# Patient Record
Sex: MALE | Race: WHITE | NOT HISPANIC OR LATINO | Employment: OTHER | URBAN - METROPOLITAN AREA
[De-identification: names, ages, dates, MRNs, and addresses within clinical notes are randomized per-mention and may not be internally consistent; named-entity substitution may affect disease eponyms.]

---

## 2020-08-03 ENCOUNTER — TRANSFERRED RECORDS (OUTPATIENT)
Dept: HEALTH INFORMATION MANAGEMENT | Facility: CLINIC | Age: 50
End: 2020-08-03

## 2020-08-24 ENCOUNTER — TRANSFERRED RECORDS (OUTPATIENT)
Dept: HEALTH INFORMATION MANAGEMENT | Facility: CLINIC | Age: 50
End: 2020-08-24

## 2020-09-09 ENCOUNTER — TRANSFERRED RECORDS (OUTPATIENT)
Dept: HEALTH INFORMATION MANAGEMENT | Facility: CLINIC | Age: 50
End: 2020-09-09

## 2020-10-14 ENCOUNTER — TRANSFERRED RECORDS (OUTPATIENT)
Dept: HEALTH INFORMATION MANAGEMENT | Facility: CLINIC | Age: 50
End: 2020-10-14

## 2021-03-12 ENCOUNTER — TRANSFERRED RECORDS (OUTPATIENT)
Dept: HEALTH INFORMATION MANAGEMENT | Facility: CLINIC | Age: 51
End: 2021-03-12

## 2022-11-19 ENCOUNTER — HOSPITAL ENCOUNTER (EMERGENCY)
Facility: HOSPITAL | Age: 52
Discharge: HOME | End: 2022-11-19
Attending: PHYSICIAN ASSISTANT | Admitting: PHYSICIAN ASSISTANT
Payer: COMMERCIAL

## 2022-11-19 VITALS
SYSTOLIC BLOOD PRESSURE: 147 MMHG | HEART RATE: 79 BPM | TEMPERATURE: 97.3 F | DIASTOLIC BLOOD PRESSURE: 95 MMHG | RESPIRATION RATE: 16 BRPM | OXYGEN SATURATION: 96 %

## 2022-11-19 DIAGNOSIS — S71.132A PUNCTURE WOUND OF LEFT THIGH, INITIAL ENCOUNTER: ICD-10-CM

## 2022-11-19 PROBLEM — M05.9 SEROPOSITIVE RHEUMATOID ARTHRITIS (H): Status: ACTIVE | Noted: 2019-12-28

## 2022-11-19 PROBLEM — K40.90 NON-RECURRENT UNILATERAL INGUINAL HERNIA WITHOUT OBSTRUCTION OR GANGRENE: Status: ACTIVE | Noted: 2021-01-26

## 2022-11-19 PROCEDURE — 99213 OFFICE O/P EST LOW 20 MIN: CPT | Performed by: PHYSICIAN ASSISTANT

## 2022-11-19 PROCEDURE — 96372 THER/PROPH/DIAG INJ SC/IM: CPT | Performed by: PHYSICIAN ASSISTANT

## 2022-11-19 PROCEDURE — 90715 TDAP VACCINE 7 YRS/> IM: CPT | Performed by: PHYSICIAN ASSISTANT

## 2022-11-19 PROCEDURE — 250N000011 HC RX IP 250 OP 636: Performed by: PHYSICIAN ASSISTANT

## 2022-11-19 PROCEDURE — G0463 HOSPITAL OUTPT CLINIC VISIT: HCPCS | Mod: 25

## 2022-11-19 PROCEDURE — 90471 IMMUNIZATION ADMIN: CPT | Performed by: PHYSICIAN ASSISTANT

## 2022-11-19 RX ORDER — SACCHAROMYCES BOULARDII 250 MG
250 CAPSULE ORAL 2 TIMES DAILY
Qty: 20 CAPSULE | Refills: 0 | Status: SHIPPED | OUTPATIENT
Start: 2022-11-19 | End: 2022-11-29

## 2022-11-19 RX ORDER — SULFAMETHOXAZOLE/TRIMETHOPRIM 800-160 MG
1 TABLET ORAL 2 TIMES DAILY
Qty: 20 TABLET | Refills: 0 | Status: SHIPPED | OUTPATIENT
Start: 2022-11-19 | End: 2022-11-29

## 2022-11-19 RX ORDER — CEFTRIAXONE SODIUM 1 G
1 VIAL (EA) INJECTION ONCE
Status: COMPLETED | OUTPATIENT
Start: 2022-11-19 | End: 2022-11-19

## 2022-11-19 RX ADMIN — CEFTRIAXONE 1 G: 1 INJECTION, POWDER, FOR SOLUTION INTRAMUSCULAR; INTRAVENOUS at 12:08

## 2022-11-19 RX ADMIN — CLOSTRIDIUM TETANI TOXOID ANTIGEN (FORMALDEHYDE INACTIVATED), CORYNEBACTERIUM DIPHTHERIAE TOXOID ANTIGEN (FORMALDEHYDE INACTIVATED), BORDETELLA PERTUSSIS TOXOID ANTIGEN (GLUTARALDEHYDE INACTIVATED), BORDETELLA PERTUSSIS FILAMENTOUS HEMAGGLUTININ ANTIGEN (FORMALDEHYDE INACTIVATED), BORDETELLA PERTUSSIS PERTACTIN ANTIGEN, AND BORDETELLA PERTUSSIS FIMBRIAE 2/3 ANTIGEN 0.5 ML: 5; 2; 2.5; 5; 3; 5 INJECTION, SUSPENSION INTRAMUSCULAR at 12:07

## 2022-11-19 ASSESSMENT — ENCOUNTER SYMPTOMS
FLANK PAIN: 0
STRIDOR: 0
COUGH: 0
FACIAL ASYMMETRY: 0
AGITATION: 0
FEVER: 0
SEIZURES: 0
EYE REDNESS: 0
FACIAL SWELLING: 0
CONFUSION: 0
TREMORS: 0

## 2022-11-19 NOTE — ED TRIAGE NOTES
"Pt presents with puncture wound to left thigh after cleaning a deer and \"stabbing\" himself.  Bleeding controlled in triage, wrapped in guaze and coban.       "

## 2022-11-19 NOTE — ED PROVIDER NOTES
History     Chief Complaint   Patient presents with     Puncture Wound     HPI  Robson Galeano is a 52 year old male who was helping a friend clean a deer.  He accidentally sustained a puncture wound to his left distal thigh area.  This initially bled out a lot but this has since subsided.  The patient is unclear when his last tetanus was.  He is concerned about infection.  He states his knife was not very clean.  Denies any other issues at this time.    Allergies:  No Known Allergies    Problem List:    There are no problems to display for this patient.       Past Medical History:    No past medical history on file.    Past Surgical History:    No past surgical history on file.    Family History:    No family history on file.    Social History:  Marital Status:   [2]        Medications:    No current outpatient medications on file.        Review of Systems   Constitutional: Negative for fever.   HENT: Negative for drooling and facial swelling.    Eyes: Negative for redness and visual disturbance.   Respiratory: Negative for cough and stridor.    Genitourinary: Negative for flank pain.   Musculoskeletal:        Left distal thigh with puncture wound,  minimal bleeding.   Skin: Negative for pallor.   Neurological: Negative for tremors, seizures and facial asymmetry.   Psychiatric/Behavioral: Negative for agitation and confusion.   All other systems reviewed and are negative.      Physical Exam   BP: 147/95  Pulse: 79  Temp: 97.3  F (36.3  C)  Resp: 16  SpO2: 96 %      Physical Exam  Vitals and nursing note reviewed.   Constitutional:       General: He is not in acute distress.     Appearance: Normal appearance. He is not ill-appearing or toxic-appearing.   HENT:      Head: Normocephalic.      Nose: Nose normal.   Eyes:      General: No scleral icterus.     Extraocular Movements: Extraocular movements intact.   Neck:      Trachea: No tracheal deviation.   Cardiovascular:      Rate and Rhythm: Normal rate.    Pulmonary:      Effort: Pulmonary effort is normal. No respiratory distress.      Breath sounds: No stridor.   Musculoskeletal:         General: Normal range of motion.      Cervical back: Normal range of motion.        Legs:       Comments: Left distal thigh with 1 cm puncture wound,  minimal bleeding.  CMSx4.   Skin:     General: Skin is warm and dry.      Coloration: Skin is not jaundiced or pale.   Neurological:      General: No focal deficit present.      Mental Status: He is alert and oriented to person, place, and time.   Psychiatric:         Attention and Perception: Attention normal.         Mood and Affect: Mood normal.         ED Course       No results found for this or any previous visit (from the past 24 hour(s)).    Medications   Tdap (tetanus-diphtheria-acell pertussis) (ADACEL) injection 0.5 mL (0.5 mLs Intramuscular Given 11/19/22 1207)   cefTRIAXone (ROCEPHIN) in lidocaine 1% (PF) injection 1 g (1 g Intramuscular Given 11/19/22 1208)       Assessments & Plan (with Medical Decision Making)     I have reviewed the nursing notes.    I have reviewed the findings, diagnosis, plan and need for follow up with the patient.      New Prescriptions    SACCHAROMYCES BOULARDII (FLORASTOR) 250 MG CAPSULE    Take 1 capsule (250 mg) by mouth 2 times daily for 10 days    SULFAMETHOXAZOLE-TRIMETHOPRIM (BACTRIM DS) 800-160 MG TABLET    Take 1 tablet by mouth 2 times daily for 10 days       Final diagnoses:   Puncture wound of left thigh, initial encounter     Robson Galeano is a 52 year old male who was helping a friend clean a deer.  He accidentally sustained a puncture wound to his left distal thigh area.  This initially bled out a lot but this has since subsided.  The patient is unclear when his last tetanus was.  He is concerned about infection.  He states his knife was not very clean.  Denies any other issues at this time.  Physical exam shows he is afebrile.  Vital signs stable.  His Left distal thigh with 1  cm puncture wound,  minimal bleeding.  CMSx4.  This was cleansed and bandaged.  I discussed with him that given its a puncture wound we will at this heal via secondary intention.  Patient was given a tetanus booster.  He was also given Rocephin IM in the ER.  Rx for Bactrim and Florastor.  Wound care was discussed.  Red flags when to return were discussed as well.  Continue to monitor his symptoms and return if there is any concerns for further evaluation as needed.  I explained my diagnostic considerations and recommendations and the patient voiced an understanding and was in agreement with the treatment plan. All questions were answered to the best of my ability.  We discussed potential side effects of any prescribed or recommended therapies, as well as expectations for response to treatments.    11/19/2022   [facility]     Masood Currie PA-C  11/19/22 1430

## 2022-11-19 NOTE — ED TRIAGE NOTES
Pt presents with c/o stab wound from when he was cleaning a deer.  States that the knife was dirty,  From cleaning a deer.  tt-dap wanted

## 2023-03-02 ENCOUNTER — OFFICE VISIT (OUTPATIENT)
Dept: FAMILY MEDICINE | Facility: CLINIC | Age: 53
End: 2023-03-02
Payer: COMMERCIAL

## 2023-03-02 ENCOUNTER — TELEPHONE (OUTPATIENT)
Dept: FAMILY MEDICINE | Facility: CLINIC | Age: 53
End: 2023-03-02

## 2023-03-02 VITALS
OXYGEN SATURATION: 97 % | SYSTOLIC BLOOD PRESSURE: 136 MMHG | HEIGHT: 67 IN | BODY MASS INDEX: 34.21 KG/M2 | HEART RATE: 66 BPM | RESPIRATION RATE: 16 BRPM | TEMPERATURE: 96.6 F | WEIGHT: 218 LBS | DIASTOLIC BLOOD PRESSURE: 76 MMHG

## 2023-03-02 DIAGNOSIS — E29.1 HYPOGONADISM MALE: ICD-10-CM

## 2023-03-02 DIAGNOSIS — N40.0 BENIGN PROSTATIC HYPERPLASIA, UNSPECIFIED WHETHER LOWER URINARY TRACT SYMPTOMS PRESENT: ICD-10-CM

## 2023-03-02 DIAGNOSIS — A60.00 GENITAL HERPES SIMPLEX, UNSPECIFIED SITE: ICD-10-CM

## 2023-03-02 DIAGNOSIS — E66.01 MORBID OBESITY (H): ICD-10-CM

## 2023-03-02 DIAGNOSIS — M54.50 CHRONIC BILATERAL LOW BACK PAIN WITHOUT SCIATICA: Primary | ICD-10-CM

## 2023-03-02 DIAGNOSIS — Z11.59 NEED FOR HEPATITIS C SCREENING TEST: ICD-10-CM

## 2023-03-02 DIAGNOSIS — Z13.6 CARDIOVASCULAR SCREENING; LDL GOAL LESS THAN 130: ICD-10-CM

## 2023-03-02 DIAGNOSIS — G47.01 INSOMNIA DUE TO MEDICAL CONDITION: ICD-10-CM

## 2023-03-02 DIAGNOSIS — L30.9 DERMATITIS: ICD-10-CM

## 2023-03-02 DIAGNOSIS — Z12.11 SCREEN FOR COLON CANCER: ICD-10-CM

## 2023-03-02 DIAGNOSIS — G89.29 CHRONIC BILATERAL LOW BACK PAIN WITHOUT SCIATICA: Primary | ICD-10-CM

## 2023-03-02 DIAGNOSIS — M05.9 SEROPOSITIVE RHEUMATOID ARTHRITIS (H): ICD-10-CM

## 2023-03-02 LAB
ALBUMIN SERPL BCG-MCNC: 4.6 G/DL (ref 3.5–5.2)
ALP SERPL-CCNC: 90 U/L (ref 40–129)
ALT SERPL W P-5'-P-CCNC: 47 U/L (ref 10–50)
ANION GAP SERPL CALCULATED.3IONS-SCNC: 10 MMOL/L (ref 7–15)
AST SERPL W P-5'-P-CCNC: 29 U/L (ref 10–50)
BILIRUB SERPL-MCNC: 0.6 MG/DL
BUN SERPL-MCNC: 16.5 MG/DL (ref 6–20)
CALCIUM SERPL-MCNC: 9.3 MG/DL (ref 8.6–10)
CHLORIDE SERPL-SCNC: 107 MMOL/L (ref 98–107)
CHOLEST SERPL-MCNC: 158 MG/DL
CREAT SERPL-MCNC: 1.13 MG/DL (ref 0.67–1.17)
DEPRECATED HCO3 PLAS-SCNC: 28 MMOL/L (ref 22–29)
ERYTHROCYTE [DISTWIDTH] IN BLOOD BY AUTOMATED COUNT: 12.2 % (ref 10–15)
GFR SERPL CREATININE-BSD FRML MDRD: 77 ML/MIN/1.73M2
GLUCOSE SERPL-MCNC: 181 MG/DL (ref 70–99)
HBA1C MFR BLD: 5.6 % (ref 0–5.6)
HCT VFR BLD AUTO: 41.9 % (ref 40–53)
HDLC SERPL-MCNC: 45 MG/DL
HGB BLD-MCNC: 14.4 G/DL (ref 13.3–17.7)
LDLC SERPL CALC-MCNC: 86 MG/DL
MCH RBC QN AUTO: 30.1 PG (ref 26.5–33)
MCHC RBC AUTO-ENTMCNC: 34.4 G/DL (ref 31.5–36.5)
MCV RBC AUTO: 88 FL (ref 78–100)
NONHDLC SERPL-MCNC: 113 MG/DL
PLATELET # BLD AUTO: 158 10E3/UL (ref 150–450)
POTASSIUM SERPL-SCNC: 3.6 MMOL/L (ref 3.4–5.3)
PROT SERPL-MCNC: 7.1 G/DL (ref 6.4–8.3)
RBC # BLD AUTO: 4.79 10E6/UL (ref 4.4–5.9)
SODIUM SERPL-SCNC: 145 MMOL/L (ref 136–145)
TRIGL SERPL-MCNC: 137 MG/DL
WBC # BLD AUTO: 4.6 10E3/UL (ref 4–11)

## 2023-03-02 PROCEDURE — 80053 COMPREHEN METABOLIC PANEL: CPT | Performed by: PHYSICIAN ASSISTANT

## 2023-03-02 PROCEDURE — 99204 OFFICE O/P NEW MOD 45 MIN: CPT | Performed by: PHYSICIAN ASSISTANT

## 2023-03-02 PROCEDURE — 84270 ASSAY OF SEX HORMONE GLOBUL: CPT | Performed by: PHYSICIAN ASSISTANT

## 2023-03-02 PROCEDURE — 36415 COLL VENOUS BLD VENIPUNCTURE: CPT | Performed by: PHYSICIAN ASSISTANT

## 2023-03-02 PROCEDURE — 83036 HEMOGLOBIN GLYCOSYLATED A1C: CPT | Performed by: PHYSICIAN ASSISTANT

## 2023-03-02 PROCEDURE — 80061 LIPID PANEL: CPT | Performed by: PHYSICIAN ASSISTANT

## 2023-03-02 PROCEDURE — 84403 ASSAY OF TOTAL TESTOSTERONE: CPT | Performed by: PHYSICIAN ASSISTANT

## 2023-03-02 PROCEDURE — 86803 HEPATITIS C AB TEST: CPT | Performed by: PHYSICIAN ASSISTANT

## 2023-03-02 PROCEDURE — 85027 COMPLETE CBC AUTOMATED: CPT | Performed by: PHYSICIAN ASSISTANT

## 2023-03-02 RX ORDER — VALACYCLOVIR HYDROCHLORIDE 1 G/1
1000 TABLET, FILM COATED ORAL DAILY
Qty: 5 TABLET | Refills: 6 | Status: SHIPPED | OUTPATIENT
Start: 2023-03-02 | End: 2023-08-02

## 2023-03-02 RX ORDER — FOLIC ACID 1 MG/1
1 TABLET ORAL DAILY
COMMUNITY
Start: 2021-05-17 | End: 2023-03-02

## 2023-03-02 RX ORDER — TESTOSTERONE CYPIONATE 200 MG/ML
50 INJECTION, SOLUTION INTRAMUSCULAR WEEKLY
Qty: 3 ML | Refills: 1 | Status: SHIPPED | OUTPATIENT
Start: 2023-03-02 | End: 2023-06-28

## 2023-03-02 RX ORDER — HYDROCODONE BITARTRATE AND ACETAMINOPHEN 5; 325 MG/1; MG/1
1-2 TABLET ORAL
COMMUNITY
Start: 2021-05-27 | End: 2023-03-02

## 2023-03-02 RX ORDER — TAMSULOSIN HYDROCHLORIDE 0.4 MG/1
0.4 CAPSULE ORAL
COMMUNITY
Start: 2022-07-28 | End: 2023-03-02

## 2023-03-02 RX ORDER — TESTOSTERONE CYPIONATE 200 MG/ML
50 INJECTION, SOLUTION INTRAMUSCULAR WEEKLY
Qty: 3 ML | Refills: 1 | Status: SHIPPED | OUTPATIENT
Start: 2023-03-02 | End: 2023-03-02

## 2023-03-02 RX ORDER — NEEDLES, DISPOSABLE 25GX5/8"
1 NEEDLE, DISPOSABLE MISCELLANEOUS WEEKLY
Qty: 12 EACH | Refills: 3 | Status: SHIPPED | OUTPATIENT
Start: 2023-03-02 | End: 2024-07-05

## 2023-03-02 RX ORDER — TESTOSTERONE CYPIONATE 200 MG/ML
INJECTION, SOLUTION INTRAMUSCULAR
COMMUNITY
End: 2023-03-02

## 2023-03-02 RX ORDER — VALACYCLOVIR HYDROCHLORIDE 1 G/1
TABLET, FILM COATED ORAL
COMMUNITY
Start: 2022-01-31 | End: 2023-03-02

## 2023-03-02 RX ORDER — TRIAMCINOLONE ACETONIDE 1 MG/G
OINTMENT TOPICAL 2 TIMES DAILY
Qty: 80 G | Refills: 1 | Status: SHIPPED | OUTPATIENT
Start: 2023-03-02 | End: 2024-01-16

## 2023-03-02 RX ORDER — FOLIC ACID 1 MG/1
1000 TABLET ORAL DAILY
Qty: 90 TABLET | Refills: 3 | Status: SHIPPED | OUTPATIENT
Start: 2023-03-02 | End: 2023-04-27

## 2023-03-02 RX ORDER — TAMSULOSIN HYDROCHLORIDE 0.4 MG/1
0.4 CAPSULE ORAL DAILY
Qty: 90 CAPSULE | Refills: 3 | Status: SHIPPED | OUTPATIENT
Start: 2023-03-02 | End: 2024-01-16

## 2023-03-02 RX ORDER — AMITRIPTYLINE HYDROCHLORIDE 10 MG/1
10 TABLET ORAL AT BEDTIME
Qty: 30 TABLET | Refills: 1 | Status: SHIPPED | OUTPATIENT
Start: 2023-03-02 | End: 2023-04-07

## 2023-03-02 RX ORDER — TRIAMCINOLONE ACETONIDE 1 MG/G
OINTMENT TOPICAL
COMMUNITY
Start: 2022-08-24 | End: 2023-03-02

## 2023-03-02 ASSESSMENT — PAIN SCALES - GENERAL: PAINLEVEL: MODERATE PAIN (5)

## 2023-03-02 NOTE — NURSING NOTE
"Chief Complaint   Patient presents with     Back Pain     BP (!) 142/92   Pulse 66   Temp (!) 96.6  F (35.9  C) (Tympanic)   Resp 16   Ht 1.702 m (5' 7\")   Wt 98.9 kg (218 lb)   SpO2 97%   BMI 34.14 kg/m   Estimated body mass index is 34.14 kg/m  as calculated from the following:    Height as of this encounter: 1.702 m (5' 7\").    Weight as of this encounter: 98.9 kg (218 lb).  Patient presents to the clinic using No DME      Health Maintenance that is potentially due pending provider review:    Health Maintenance Due   Topic Date Due     YEARLY PREVENTIVE VISIT  Never done     ANNUAL REVIEW OF HM ORDERS  Never done     ADVANCE CARE PLANNING  Never done     HEPATITIS B IMMUNIZATION (1 of 3 - 3-dose series) Never done     COLORECTAL CANCER SCREENING  Never done     HIV SCREENING  Never done     HEPATITIS C SCREENING  Never done     LIPID  Never done     COVID-19 Vaccine (4 - Booster for Moderna series) 02/01/2022     INFLUENZA VACCINE (1) 09/01/2022        Colonoscopy 5 years ago - family history mother colon cancer 48.        "

## 2023-03-02 NOTE — PATIENT INSTRUCTIONS
All your medicines except for Methotrexate were refilled.     Start Amitriptyline for sleep and I hope that this works for your back pain as well.     Follow-up in 1 month for recheck.

## 2023-03-02 NOTE — TELEPHONE ENCOUNTER
ORDER FOR TESTOSTERONE SENT TO [facility] IN [facility] --- FREIDA IS HERE BACK IN  [place] --- CAUSE CONTROLLED SUBSTANCE NEED NEW ORDER SENT TO THIS PHARMACY PLEASE - CAN'T TRANSFER -- THANK YOU Jennifer heck,pharmacy technican

## 2023-03-02 NOTE — PROGRESS NOTES
"Assessment & Plan   Chronic bilateral low back pain without sciatica  Insomnia due to medical condition  Patient is transferring care from [facility].  He has seen [facility] for his chronic low back pain in the past.  He has tried multiple injections with intermittent improvement as well as a radiofrequency ablation in the past.  He has not seen them in about 1 year.  He is planning to follow-up somewhere closer to his home now which is out of the [facility] so he was referred to spine with [facility] for further management of his previous back issues.  In the meantime he does note very significant difficulty sleeping due to pain.  Only gets about 3 hours of sleep per night due to his pain.  He has not really tried any medicine for this.  I discussed amitriptyline as a medicine that helps make people sleepy but also helps for people who have chronic pain/neuropathic pain.  He would like to start this to see if it works.  Start 10 mg at bedtime and follow-up in 1 month for recheck.  - Spine  Referral; Future  - amitriptyline (ELAVIL) 10 MG tablet; Take 1 tablet (10 mg) by mouth At Bedtime    Hypogonadism male  Previously has been on testosterone injections at home.   is reassuring.  We will recheck testosterone today, CBC, CMP.  Restart his previous dose of testosterone and supplies ordered.  Follow-up in 3 to 6 months for recheck  - Testosterone Free and Total; Future  - CBC with platelets; Future  - Comprehensive metabolic panel; Future  - Needle, Disp, (B-D HYPODERMIC NEEDLE) 18G X 1-1/2\" MISC; 1 each once a week  - Syringe Luer Slip (B-D SYRINGE LUER-MARIALUISA) 3 ML MISC; 1 each once a week  - Needle, Disp, (B-D HYPODERMIC NEEDLE) 22G X 1\" MISC; 1 each once a week  - testosterone cypionate (DEPOTESTOSTERONE) 200 MG/ML injection; Inject 0.25 mLs (50 mg) into the muscle once a week    Morbid obesity (H)  Body mass index is 34.14 kg/m .. Associated with hypogonadism, chronic back pain which would improve " "with weight loss. Encouraged healthy lifestyle changes.   - Hemoglobin A1c; Future    Benign prostatic hyperplasia, unspecified whether lower urinary tract symptoms present  Has been out of Flomax for about 6 months.  We discussed the increased risk of using testosterone in people who have BPH since it can artificially elevate PSA but also increase the risk of prostate cancer.  Patient is aware of this risk.  Restart Flomax.  - tamsulosin (FLOMAX) 0.4 MG capsule; Take 1 capsule (0.4 mg) by mouth daily    Seropositive rheumatoid arthritis (H)  Has been on methotrexate for quite some time however has not followed up with his rheumatologist in greater than 6 months so has been out of medicine.  I am not really comfortable prescribing methotrexate since I do not fully know its side effects, monitoring.  He was referred to rheumatology here in [place].  Refilled folic acid.  - Adult Rheumatology  Referral; Future  - folic acid (FOLVITE) 1 MG tablet; Take 1 tablet (1,000 mcg) by mouth daily    Dermatitis  Eczematous rash of his hands.  Refill triamcinolone  - triamcinolone (KENALOG) 0.1 % external ointment; Apply topically 2 times daily Tube should last 3 months.    Genital herpes simplex, unspecified site  Chronic and stable.  Refilled Valtrex.  - valACYclovir (VALTREX) 1000 mg tablet; Take 1 tablet (1,000 mg) by mouth daily for 5 days    Screen for colon cancer  Due for rescreening.  Colonoscopy ordered.  - Colonoscopy Screening  Referral; Future    Need for hepatitis C screening test  Due for screening.  - Hepatitis C Screen Reflex to HCV RNA Quant and Genotype; Future    CARDIOVASCULAR SCREENING; LDL GOAL LESS THAN 130  Due for screening.  - Lipid panel reflex to direct LDL Non-fasting; Future    BMI:   Estimated body mass index is 34.14 kg/m  as calculated from the following:    Height as of this encounter: 1.702 m (5' 7\").    Weight as of this encounter: 98.9 kg (218 lb).     Return in about 4 " "weeks (around 3/30/2023) for In-Clinic Visit, Medication refill.    JUSTIN Dickson [facility]    Chris Chance is a 53 year old, presenting for the following health issues:  Back Pain      History of Present Illness       Back Pain:  He presents for follow up of back pain. Patient's back pain is a chronic problem.  Location of back pain:  Right lower back, left lower back, right middle of back, left middle of back and left hip  Description of back pain: gnawing, shooting and stabbing  Back pain spreads: nowhere    Since patient first noticed back pain, pain is: always present, but gets better and worse  Does back pain interfere with his job:  Yes      Reason for visit:  Physical get meds    He eats 0-1 servings of fruits and vegetables daily.He consumes 0 sweetened beverage(s) daily.He exercises with enough effort to increase his heart rate 20 to 29 minutes per day.  He exercises with enough effort to increase his heart rate 5 days per week. He is missing 7 dose(s) of medications per week.    Has been out of meds for 6 months. Moved from the [facility]. Has not follow-up with specialists since that time.   Doing okay. Certainly feels more pain since off of Methotrexate and urinary symptoms have returned without Flomax.       Review of Systems   See HPI       Objective    /76   Pulse 66   Temp (!) 96.6  F (35.9  C) (Tympanic)   Resp 16   Ht 1.702 m (5' 7\")   Wt 98.9 kg (218 lb)   SpO2 97%   BMI 34.14 kg/m    Body mass index is 34.14 kg/m .  Physical Exam   Constitutional: healthy, alert, and no distress  Head: Normocephalic. Atraumatic  Eyes: No conjunctival injection, sclera anicteric  Respiratory: No resp distress.  Musculoskeletal: extremities normal- no gross deformities noted, and normal muscle tone  Neurologic: Gait normal. CN 2-12 grossly intact  Psychiatric: mentation appears normal and affect normal/bright               "

## 2023-03-05 LAB — HCV AB SERPL QL IA: NONREACTIVE

## 2023-03-06 LAB — SHBG SERPL-SCNC: 26 NMOL/L (ref 11–80)

## 2023-03-07 LAB
TESTOST FREE SERPL-MCNC: 4.67 NG/DL
TESTOST SERPL-MCNC: 214 NG/DL (ref 240–950)

## 2023-03-15 ENCOUNTER — OFFICE VISIT (OUTPATIENT)
Dept: RHEUMATOLOGY | Facility: CLINIC | Age: 53
End: 2023-03-15
Payer: COMMERCIAL

## 2023-03-15 ENCOUNTER — ANCILLARY PROCEDURE (OUTPATIENT)
Dept: GENERAL RADIOLOGY | Facility: CLINIC | Age: 53
End: 2023-03-15
Attending: PHYSICIAN ASSISTANT
Payer: COMMERCIAL

## 2023-03-15 VITALS
BODY MASS INDEX: 34.21 KG/M2 | SYSTOLIC BLOOD PRESSURE: 132 MMHG | DIASTOLIC BLOOD PRESSURE: 84 MMHG | WEIGHT: 218 LBS | HEIGHT: 67 IN

## 2023-03-15 DIAGNOSIS — M05.9 SEROPOSITIVE RHEUMATOID ARTHRITIS (H): Primary | ICD-10-CM

## 2023-03-15 DIAGNOSIS — Z79.899 HIGH RISK MEDICATION USE: ICD-10-CM

## 2023-03-15 DIAGNOSIS — M05.9 SEROPOSITIVE RHEUMATOID ARTHRITIS (H): ICD-10-CM

## 2023-03-15 PROCEDURE — 73130 X-RAY EXAM OF HAND: CPT | Mod: TC | Performed by: RADIOLOGY

## 2023-03-15 PROCEDURE — 99204 OFFICE O/P NEW MOD 45 MIN: CPT | Performed by: PHYSICIAN ASSISTANT

## 2023-03-15 RX ORDER — METHYLPREDNISOLONE 4 MG
TABLET, DOSE PACK ORAL
Qty: 21 TABLET | Refills: 0 | Status: SHIPPED | OUTPATIENT
Start: 2023-03-15 | End: 2023-04-06

## 2023-03-15 NOTE — PROGRESS NOTES
Rheumatology Clinic Visit  [facility]  ALVARADO Bunch     Robson Galeano MRN# [id number]   YOB: 1970 Age: 53 year old   Date of Visit: 03/15/2023  Primary care provider: Jace Campbell          Assessment and Plan:     1.  Seropositive rheumatoid arthritis  2.  High-risk medication use    Patient presents today to establish care for his seropositive rheumatoid arthritis.  He was previously treated with methotrexate, 25 mg weekly and reports that this worked quite well for him.  He does get diarrhea for 1.5 days after his methotrexate dosing.  He otherwise tolerates it well.  He has been taking his folic acid.  Physical examination does show some synovitis to the right wrist and decreased fist formation on the right.  Previous laboratory evaluations and imaging studies reviewed.  Results below.    We will get the patient started on methotrexate again as this worked well for his rheumatoid arthritis in the past.  We did discuss decreasing alcohol to 2 beverages per week.  He verbalized his understanding.  He will continue the folic acid, he will increase it to 2 mg to see if he can tolerate the methotrexate better.  He can also use Imodium as needed for diarrhea.  As he has active synovitis and is noticing a flare coming on we will try a Medrol Dosepak.  He will continue working with the spine clinic as well for his back pain.  We will monitor labs every 3 months to monitor for any medication toxicity.    Plan:     1. Schedule follow-up with Mary Sargent PA-C in 3 months.   2. Imaging: xray hands today  3. Labs: CBC, creatinine, albumin, AST, ALT, CRP and Sed Rate every 3 months  4. Medication recommendations:   a. Methotrexate: Will start you on 15mg (6 tablets) WEEKLY for 2 weeks, then increase to 8 tablets weekly.  While on Methotrexate:  b. -check labs (ALT/AST, albumin, CBC with platelets and creatinine) every 3 months  c. -Limit alcohol to 2 drinks weekly  d. -Take Folic Acid 2mg  daily   e. -Tylenol 500-1000mg can be used as needed up to three times daily for nausea/headache associated with dosing  f. # Methotrexate Risks and Benefits: The risks and benefits of methotrexate were discussed in detail and the patient verbalized understanding.  The risks discussed include, but are not limited to, the risk for hypersensitivity, anaphylaxis, anaphylactoid reactions, infections, bone marrow suppression, renal toxicity, hepatotoxicity, pulmonary toxicity, malignancy, impaired fertility, GI upset, alopecia, and oral and nasal sores.  Folic acid supplementation is recommended during methotrexate therapy to help prevent some of the side effects. Pregnancy prevention and planning was discussed; it is recommended that women of childbearing potential use reliable contraception during therapy.  The risks of taking both methotrexate and alcohol were reviewed; complete alcohol avoidance was discussed.  Routine laboratory monitoring is required during methotrexate therapy. Taking MTX once weekly, all within a 24 hour period was stressed and the patient verbalized this instruction back to me.  I encouraged reviewing the package insert and asking any questions about the medication.    Mary Sargent, [facility]  Rheumatology         History of Present Illness:   Robson Galeano presents for evaluation of rheumatoid arthritis.      Was diagnosed in 2019. At the time of diagnosis, his hands, elbows and shoulders were bothering him. He states previously getting injections. He does have back pain as well. He has been off of methotrexate for 5-6 months. He has tried medical cannabis, which helps. He was having relief with the methotrexate. He was taking it as a split dose. He has continued on the folic acid.     No infection. No personal history of cancer. No cardiac history, including high blood pressure high cholesterol. He drinks 4-5 beers/week.     Rheumatological history:  Provider(s): Dr. Jackson, Dr. Unique Mensah  office visit: 2021  Pertinent lab history:  +CCP (251), Neg RF  Previous medications tried: NSAIDs, Methotrexate  Current medications: none           Review of Systems:     Constitutional: negative  Skin: negative  Eyes: negative  Ears/Nose/Throat: negative  Respiratory: No shortness of breath, dyspnea on exertion, cough, or hemoptysis  Cardiovascular: negative  Gastrointestinal: negative  Genitourinary: negative  Musculoskeletal: as above  Neurologic: negative  Psychiatric: negative  Hematologic/Lymphatic/Immunologic: negative  Endocrine: negative         Active Problem List:     Patient Active Problem List    Diagnosis Date Noted     Chronic bilateral low back pain without sciatica 2023     Priority: Medium     Benign prostatic hyperplasia, unspecified whether lower urinary tract symptoms present 2023     Priority: Medium     Hypogonadism male 2021     Priority: Medium     Non-recurrent unilateral inguinal hernia without obstruction or gangrene 2021     Priority: Medium     Seropositive rheumatoid arthritis (H) 2019     Priority: Medium     Ocular migraine 2012     Priority: Medium     Formatting of this note might be different from the original.  Rare-last 10-11       Morbid obesity (H) 2012     Priority: Medium     Formatting of this note might be different from the original.              Past Medical History:   No past medical history on file.  No past surgical history on file.         Social History:     Social History     Socioeconomic History     Marital status:      Spouse name: Not on file     Number of children: Not on file     Years of education: Not on file     Highest education level: Not on file   Occupational History     Not on file   Tobacco Use     Smoking status: Former     Packs/day: 0.50     Types: Cigarettes     Quit date: 2002     Years since quittin.2     Smokeless tobacco: Never     Tobacco comments:     Has medical marijuana  "card   Vaping Use     Vaping Use: Never used   Substance and Sexual Activity     Alcohol use: Not on file     Drug use: Not on file     Sexual activity: Not on file   Other Topics Concern     Not on file   Social History Narrative     Not on file     Social Determinants of Health     Financial Resource Strain: Not on file   Food Insecurity: Not on file   Transportation Needs: Not on file   Physical Activity: Not on file   Stress: Not on file   Social Connections: Not on file   Intimate Partner Violence: Not on file   Housing Stability: Not on file          Family History:   No family history on file.         Allergies:     Allergies   Allergen Reactions     Bee Venom Other (See Comments)     Hallucinates, no anaphylaxis  Hallucinates, no anaphylaxis  Hallucinates, no anaphylaxis              Medications:     Current Outpatient Medications   Medication Sig Dispense Refill     amitriptyline (ELAVIL) 10 MG tablet Take 1 tablet (10 mg) by mouth At Bedtime 30 tablet 1     folic acid (FOLVITE) 1 MG tablet Take 1 tablet (1,000 mcg) by mouth daily 90 tablet 3     Needle, Disp, (B-D HYPODERMIC NEEDLE) 18G X 1-1/2\" MISC 1 each once a week 12 each 3     Needle, Disp, (B-D HYPODERMIC NEEDLE) 22G X 1\" MISC 1 each once a week 12 each 3     Syringe Luer Slip (B-D SYRINGE LUER-MARIALUISA) 3 ML MISC 1 each once a week 12 each 3     tamsulosin (FLOMAX) 0.4 MG capsule Take 1 capsule (0.4 mg) by mouth daily 90 capsule 3     testosterone cypionate (DEPOTESTOSTERONE) 200 MG/ML injection Inject 0.25 mLs (50 mg) into the muscle once a week 3 mL 1     triamcinolone (KENALOG) 0.1 % external ointment Apply topically 2 times daily Tube should last 3 months. 80 g 1     methotrexate 2.5 MG tablet Take 25 mg by mouth (Patient not taking: Reported on 3/2/2023)       valACYclovir (VALTREX) 1000 mg tablet Take 1 tablet (1,000 mg) by mouth daily for 5 days 5 tablet 6            Physical Exam:   Blood pressure 132/84, height 1.702 m (5' 7\"), weight 98.9 kg " (218 lb).  Wt Readings from Last 6 Encounters:   03/15/23 98.9 kg (218 lb)   03/02/23 98.9 kg (218 lb)     Constitutional: well-developed, appearing stated age; cooperative  Eyes: nl PERRLA, conjunctiva, sclera  ENT: nl external ears, nose, hearing, lips, teeth, gums, throat. No mucositis.   No mucous membrane lesions, normal saliva pool  Neck: no mass or thyroid enlargement  Resp: No shortness of breath with normal conversation  Lymph: no cervical, supraclavicular or epitrochlear nodes  MS: Decreased fist formation on the right.  Normal  strength bilaterally.  He does have active synovitis over the right wrist as well as the right second MCP.  Skin: no nail pitting, alopecia, rash, nodules or lesions.   Neuro: nl cranial nerves.   Psych: nl judgement, orientation, memory, affect.           Data:   Imaging:  Xray foot/feet 10/18/2019  FINDINGS:  Single image showing both feet. Bony mineralization within normal limits. Joint spaces appear maintained. No erosions are seen. No abnormal soft tissue calcifications.    Xray hands 10/18/2019  IMPRESSION: Degenerative/arthritic changes in both hands as described. No definite erosions or chondrocalcinosis.    Xray SI joints 10/18/2019  FINDINGS:  Bony structures and sacroiliac joints are unremarkable. The SI joints appear intact without evidence of bony ankylosis or erosive changes.    Laboratory:  3/2/2023  Creatinine 1.13, GFR 77  Albumin 4.6  ALT 47, AST 29  CBC within normal limits  Hepatitis C nonreactive

## 2023-03-15 NOTE — PATIENT INSTRUCTIONS
After Visit Instructions:     Thank you for coming to [facility] for your care. It is my goal to partner with you to help you reach your optimal state of health.     Plan:     Schedule follow-up with Mary Sargent PA-C in 3 months.   Imaging: xray hands today  Labs: CBC, creatinine, albumin, AST, ALT, CRP and Sed Rate every 3 months  Medication recommendations:   Methotrexate: Will start you on 15mg (6 tablets) WEEKLY for 2 weeks, then increase to 8 tablets weekly.  While on Methotrexate:  -check labs (ALT/AST, albumin, CBC with platelets and creatinine) every 3 months  -Limit alcohol to 2 drinks weekly  -Take Folic Acid 2mg daily   -Tylenol 500-1000mg can be used as needed up to three times daily for nausea/headache associated with dosing  # Methotrexate Risks and Benefits: The risks and benefits of methotrexate were discussed in detail and the patient verbalized understanding.  The risks discussed include, but are not limited to, the risk for hypersensitivity, anaphylaxis, anaphylactoid reactions, infections, bone marrow suppression, renal toxicity, hepatotoxicity, pulmonary toxicity, malignancy, impaired fertility, GI upset, alopecia, and oral and nasal sores.  Folic acid supplementation is recommended during methotrexate therapy to help prevent some of the side effects. Pregnancy prevention and planning was discussed; it is recommended that women of childbearing potential use reliable contraception during therapy.  The risks of taking both methotrexate and alcohol were reviewed; complete alcohol avoidance was discussed.  Routine laboratory monitoring is required during methotrexate therapy. Taking MTX once weekly, all within a 24 hour period was stressed and the patient verbalized this instruction back to me.  I encouraged reviewing the package insert and asking any questions about the medication.      Mary Sargent PA-C  [facility]  [facility]    Contact  information: [facility]  Clinic Number:  [phone]  Please call or send a SC[facility] Access message with any questions about your care

## 2023-04-02 ENCOUNTER — HEALTH MAINTENANCE LETTER (OUTPATIENT)
Age: 53
End: 2023-04-02
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
## 2023-04-06 ENCOUNTER — TELEPHONE (OUTPATIENT)
Dept: PHYSICAL MEDICINE AND REHAB | Facility: CLINIC | Age: 53
End: 2023-04-06

## 2023-04-06 ENCOUNTER — OFFICE VISIT (OUTPATIENT)
Dept: PHYSICAL MEDICINE AND REHAB | Facility: CLINIC | Age: 53
End: 2023-04-06
Payer: COMMERCIAL

## 2023-04-06 VITALS
HEIGHT: 67 IN | HEART RATE: 75 BPM | SYSTOLIC BLOOD PRESSURE: 136 MMHG | WEIGHT: 220.1 LBS | DIASTOLIC BLOOD PRESSURE: 81 MMHG | BODY MASS INDEX: 34.55 KG/M2

## 2023-04-06 DIAGNOSIS — M47.816 LUMBAR FACET ARTHROPATHY: Primary | ICD-10-CM

## 2023-04-06 DIAGNOSIS — M54.50 CHRONIC BILATERAL LOW BACK PAIN WITHOUT SCIATICA: ICD-10-CM

## 2023-04-06 DIAGNOSIS — G89.29 CHRONIC BILATERAL LOW BACK PAIN WITHOUT SCIATICA: ICD-10-CM

## 2023-04-06 DIAGNOSIS — M47.816 SPONDYLOSIS OF LUMBAR REGION WITHOUT MYELOPATHY OR RADICULOPATHY: Primary | ICD-10-CM

## 2023-04-06 PROCEDURE — 99204 OFFICE O/P NEW MOD 45 MIN: CPT | Performed by: PHYSICIAN ASSISTANT

## 2023-04-06 ASSESSMENT — PAIN SCALES - GENERAL: PAINLEVEL: MODERATE PAIN (4)
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
## 2023-04-06 NOTE — TELEPHONE ENCOUNTER
"Per Emely DOW, \"can you please call the patient and relay that he does not need to repeat MBBs.  Okay to proceed with repeat bilateral L3, L4, L5 RFA.\"    Call placed to pt. Pt stated understanding. Order placed.  Injection requirements reviewed. Transferred pt to scheduling to make appt.       "

## 2023-04-07 ENCOUNTER — OFFICE VISIT (OUTPATIENT)
Dept: FAMILY MEDICINE | Facility: CLINIC | Age: 53
End: 2023-04-07
Payer: COMMERCIAL

## 2023-04-07 VITALS
RESPIRATION RATE: 18 BRPM | HEART RATE: 78 BPM | BODY MASS INDEX: 34.28 KG/M2 | DIASTOLIC BLOOD PRESSURE: 76 MMHG | WEIGHT: 218.4 LBS | TEMPERATURE: 97.4 F | OXYGEN SATURATION: 95 % | HEIGHT: 67 IN | SYSTOLIC BLOOD PRESSURE: 136 MMHG

## 2023-04-07 DIAGNOSIS — Z12.11 SCREEN FOR COLON CANCER: ICD-10-CM

## 2023-04-07 DIAGNOSIS — M54.50 CHRONIC BILATERAL LOW BACK PAIN WITHOUT SCIATICA: Primary | ICD-10-CM

## 2023-04-07 DIAGNOSIS — G89.29 CHRONIC BILATERAL LOW BACK PAIN WITHOUT SCIATICA: Primary | ICD-10-CM

## 2023-04-07 DIAGNOSIS — G47.01 INSOMNIA DUE TO MEDICAL CONDITION: ICD-10-CM

## 2023-04-07 PROCEDURE — 99214 OFFICE O/P EST MOD 30 MIN: CPT | Performed by: PHYSICIAN ASSISTANT

## 2023-04-07 RX ORDER — AMITRIPTYLINE HYDROCHLORIDE 10 MG/1
10 TABLET ORAL AT BEDTIME
Qty: 90 TABLET | Refills: 3 | Status: SHIPPED | OUTPATIENT
Start: 2023-04-07 | End: 2024-03-04

## 2023-04-07 RX ORDER — GABAPENTIN 300 MG/1
CAPSULE ORAL
Qty: 90 CAPSULE | Refills: 1 | Status: SHIPPED | OUTPATIENT
Start: 2023-04-07 | End: 2023-08-09

## 2023-04-07 ASSESSMENT — PAIN SCALES - GENERAL: PAINLEVEL: MODERATE PAIN (4)

## 2023-04-07 NOTE — PROGRESS NOTES
"  Assessment & Plan   Chronic bilateral low back pain without sciatica  Patient had previously been diagnosed with low back pain without sciatica.  This is currently being controlled with amitriptyline and medical marijuana to help with sleep.  Patient was worried about new pain due to upcoming laser ablation procedure by Spine. Gabapentin had previously been prescribed but has not been taken for a long period. This medication has been restarted per the patient's request.  He can follow-up with the provider concerning how well this is going and whether or not it is managing his pain well.  - amitriptyline (ELAVIL) 10 MG tablet; Take 1 tablet (10 mg) by mouth At Bedtime  - gabapentin (NEURONTIN) 300 MG capsule; Take 1 capsule (300 mg) by mouth At Bedtime for 5 days, THEN 1 capsule (300 mg) 2 times daily for 5 days, THEN 1 capsule (300 mg) 3 times daily for 5 days.    Insomnia due to medical condition  Patient's insomnia is being well controlled with amitriptyline and he reports over 6 hours of sleep per night; he is satisfied with this result.  He can continue amitriptyline and see how gabapentin affects his sleep patterns as well.  - amitriptyline (ELAVIL) 10 MG tablet; Take 1 tablet (10 mg) by mouth At Bedtime    Screen for colon cancer  Patient raises additional concern about colon cancer screening during this visit he is a good candidate for colon cancer screening and has been referred to general surgery where he can receive colon cancer screening.  - Colonoscopy Screening  Referral; Future    BMI:   Estimated body mass index is 34.21 kg/m  as calculated from the following:    Height as of this encounter: 1.702 m (5' 7\").    Weight as of this encounter: 99.1 kg (218 lb 6.4 oz).     Jace Campbell PA-C  [facility]    Chris Chance is a 53 year old, presenting for the following health issues:  No chief complaint on file.        4/7/2023     7:04 AM   Additional Questions " "  Roomed by Shruthi RANGEL CMA     History of Present Illness       Reason for visit:  Back pain  (says that he goes in for surgery on 4/27/23)  Symptom onset:  More than a month  Symptom intensity:  Moderate  Symptom progression:  Worsening    He eats 2-3 servings of fruits and vegetables daily.He consumes 0 sweetened beverage(s) daily.He exercises with enough effort to increase his heart rate 20 to 29 minutes per day.  He exercises with enough effort to increase his heart rate 4 days per week. He is missing 1 dose(s) of medications per week.  He is not taking prescribed medications regularly due to remembering to take.     Patient states that he is here to follow up from his last visit.   Also to get his medical marijuana card renewed     Robson is a 53-year-old male who presents for this appointment for laboratory follow-up as well as a sleep study follow-up.  Upon visiting the patient endorses no concerns about a lab follow-up and has a sleep study set up for later this month.  He was more concerned about his upcoming laser ablation procedure and the pain associated.  Additionally he would like to schedule colonoscopy and renew his medical marijuana card today.  He has no other concerns at at this point and review of systems is negative for any concerning symptoms.        4/7/2023     7:26 AM   PEG Score   PEG Total Score 7     Review of Systems   See HPI       Objective    /76   Pulse 78   Temp 97.4  F (36.3  C) (Tympanic)   Resp 18   Ht 1.702 m (5' 7\")   Wt 99.1 kg (218 lb 6.4 oz)   SpO2 95%   BMI 34.21 kg/m    Body mass index is 34.21 kg/m .  Physical Exam   Constitutional: healthy, alert, and no distress  Head: Normocephalic. Atraumatic  Eyes: No conjunctival injection, sclera anicteric  Respiratory: No resp distress.  Musculoskeletal: extremities normal- no gross deformities noted, and normal muscle tone  Neurologic: Gait normal. CN 2-12 grossly intact  Psychiatric: mentation appears normal and affect " normal/bright     Alberto Gray, PA-S2    Physician Attestation   I, Jace Campbell PA-C, was present with the medical/ELIZ student who participated in the service and in the documentation of the note.  I have verified the history and personally performed the physical exam and medical decision making.  I agree with the assessment and plan of care as documented in the note.      JOSH DicksonC

## 2023-04-07 NOTE — PATIENT INSTRUCTIONS
Start gabapentin for back pain. If this works well for you, then MyChart me and we can refill this for you.     Continue Amitriptyline for sleep.     Follow-up with Colonoscopy.

## 2023-04-21 ENCOUNTER — HOSPITAL ENCOUNTER (OUTPATIENT)
Facility: CLINIC | Age: 53
End: 2023-04-21
Attending: SURGERY | Admitting: SURGERY
Payer: COMMERCIAL

## 2023-04-21 ENCOUNTER — TELEPHONE (OUTPATIENT)
Dept: FAMILY MEDICINE | Facility: CLINIC | Age: 53
End: 2023-04-21
Payer: COMMERCIAL

## 2023-04-21 ENCOUNTER — TELEPHONE (OUTPATIENT)
Dept: SURGERY | Facility: CLINIC | Age: 53
End: 2023-04-21
Payer: COMMERCIAL

## 2023-04-21 DIAGNOSIS — Z12.11 SPECIAL SCREENING FOR MALIGNANT NEOPLASMS, COLON: Primary | ICD-10-CM

## 2023-04-21 NOTE — TELEPHONE ENCOUNTER
Patient's wife Diamond (consent to communicate on file) called, she is provided the number for colonoscopy scheduling, the  will provide information on prep. Diamond will call to schedule this.      FIDELINA Jasso

## 2023-04-21 NOTE — TELEPHONE ENCOUNTER
Reason for Call:  Appointment Request    Patient requesting this type of appt: Procedure: Colonoscopy    Requested provider: Jace Campbell    Reason patient unable to be scheduled: Needs to be scheduled by clinic    When does patient want to be seen/preferred time: 1-2 weeks    Comments: n/a    Could we send this information to you in [place] or would you prefer to receive a phone call?:   Patient would prefer a phone call   Okay to leave a detailed message?: Yes at Other phone number:  Please call Pt's spouse - Diamond Galeano - [phone] for scheduling.    Call taken on 4/21/2023 at 11:11 AM by Zuleika Rick

## 2023-04-21 NOTE — TELEPHONE ENCOUNTER
Screening Questions  BLUE  KIND OF PREP RED  LOCATION [review exclusion criteria] GREEN  SEDATION TYPE        Y Are you active on mychart?       [place]rian Ordering/Referring Provider?        [employer] What type of coverage do you have?      N Have you had a positive covid test in the last 14 days?     34.21 1. BMI  [BMI 40+ - review exclusion criteria& smart-phrase document]    Y  2. Are you able to give consent for your medical care? [IF NO,RN REVIEW]          N  3. Are you taking any prescription pain medications on a routine schedule   (ex narcotics: oxycodone, roxicodone, oxycontin,  and percocet)? [RN Review]        N  3a. EXTENDED PREP What kind of prescription?     N 4. Do you have any chemical dependencies such as alcohol, street drugs, or methadone?        **If yes 3- 5 , please schedule with MAC sedation.**          IF YES TO ANY 6 - 10 - HOSPITAL SETTING ONLY.     N 6.   Do you need assistance transferring?     N 7.   Have you had a heart or lung transplant?    N 8.   Are you currently on dialysis?   N 9.   Do you use daily home oxygen?   N 10. Do you take nitroglycerin?   10a. N If yes, how often?     11. [FEMALES]  N Are you currently pregnant?    11a. N If yes, how many weeks? [ Greater than 12 weeks, OR NEEDED]    N 12. Do you have Pulmonary Hypertension? *NEED PAC APPT AT [facility] w/ [facility]*     N 13. [review exclusion criteria]  Do you have any implantable devices in your body (pacemaker, defib, LVAD)?    N 14. In the past 6 months, have you had any heart related issues including cardiomyopathy or heart attack?     14a. N If yes, did it require cardiac stenting if so when?     N 15. Have you had a stroke or Transient ischemic attack (TIA - aka  mini stroke ) within 6 months?      Y 16. Do you have mod to severe Obstructive Sleep Apnea?  [Hospital only]    N 17. Do you have SEVERE AND UNCONTROLLED asthma? *NEED PAC APPT AT [facility] w/[facility]*     18. Are you currently taking any blood thinners?     18a. No.  "Continue to 19.   18b. Yes/no Blood Thinner: No [CONTINUE TO #19]    N 19. Do you take the medication Phentermine?    19a. If yes, \"Hold for 7 days before procedure.  Please consult your prescribing provider if you have questions about holding this medication.\"     N  20. Do you have chronic kidney disease?      N  21. Do you have a diagnosis of diabetes?     N  22. On a regular basis do you go 3-5 days between bowel movements?     See below 23. Preferred LOCAL Pharmacy for Pre Prescription    [ LIST ONLY ONE PHARMACY]     [place] [id number]        - CLOSING REMINDERS -    Informed patient they will need an adult    Cannot take any type of public or medical transportation alone    Conscious Sedation- Needs  for 6 hours after the procedure       MAC/General-Needs  for 24 hours after procedure    Pre-Procedure Covid test to be completed [[facility] PCR Testing Required]    Confirmed Nurse will call to complete assessment       - SCHEDULING DETAILS -  N Hospital Setting Required? If yes, what is the exclusion?: REBECCA Herrera  Surgeon    7-25-23  Date of Procedure  Lower Endoscopy [Colonoscopy]  Type of Procedure Scheduled  [facility]- If you answer yes to questions #8, #20, #21 [  pts ]Which Colonoscopy Prep was Sent?     GEN Sedation Type     N PAC / Pre-op Required                 "

## 2023-04-27 ENCOUNTER — RADIOLOGY INJECTION OFFICE VISIT (OUTPATIENT)
Dept: PHYSICAL MEDICINE AND REHAB | Facility: CLINIC | Age: 53
End: 2023-04-27
Attending: PHYSICIAN ASSISTANT
Payer: COMMERCIAL

## 2023-04-27 ENCOUNTER — MYC MEDICAL ADVICE (OUTPATIENT)
Dept: RHEUMATOLOGY | Facility: CLINIC | Age: 53
End: 2023-04-27

## 2023-04-27 VITALS
RESPIRATION RATE: 16 BRPM | HEART RATE: 62 BPM | TEMPERATURE: 98.6 F | OXYGEN SATURATION: 96 % | SYSTOLIC BLOOD PRESSURE: 140 MMHG | DIASTOLIC BLOOD PRESSURE: 90 MMHG

## 2023-04-27 DIAGNOSIS — M47.816 SPONDYLOSIS OF LUMBAR REGION WITHOUT MYELOPATHY OR RADICULOPATHY: ICD-10-CM

## 2023-04-27 DIAGNOSIS — M05.9 SEROPOSITIVE RHEUMATOID ARTHRITIS (H): ICD-10-CM

## 2023-04-27 PROCEDURE — 64635 DESTROY LUMB/SAC FACET JNT: CPT | Mod: 50 | Performed by: PAIN MEDICINE

## 2023-04-27 PROCEDURE — 64636 DESTROY L/S FACET JNT ADDL: CPT | Mod: 50 | Performed by: PAIN MEDICINE

## 2023-04-27 RX ORDER — LIDOCAINE HYDROCHLORIDE 10 MG/ML
INJECTION, SOLUTION EPIDURAL; INFILTRATION; INTRACAUDAL; PERINEURAL
Status: COMPLETED | OUTPATIENT
Start: 2023-04-27 | End: 2023-04-27

## 2023-04-27 RX ORDER — BUPIVACAINE HYDROCHLORIDE 2.5 MG/ML
INJECTION, SOLUTION EPIDURAL; INFILTRATION; INTRACAUDAL
Status: COMPLETED | OUTPATIENT
Start: 2023-04-27 | End: 2023-04-27

## 2023-04-27 RX ADMIN — LIDOCAINE HYDROCHLORIDE 10 ML: 10 INJECTION, SOLUTION EPIDURAL; INFILTRATION; INTRACAUDAL; PERINEURAL at 08:10

## 2023-04-27 RX ADMIN — BUPIVACAINE HYDROCHLORIDE 6 ML: 2.5 INJECTION, SOLUTION EPIDURAL; INFILTRATION; INTRACAUDAL at 08:11

## 2023-04-27 ASSESSMENT — PAIN SCALES - GENERAL
PAINLEVEL: MILD PAIN (2)
PAINLEVEL: MODERATE PAIN (5)

## 2023-04-27 NOTE — PATIENT INSTRUCTIONS
Please follow up in four weeks post procedure with your ordering provider to evaluate your plan of care.      DISCHARGE INSTRUCTIONS    During office hours (8:00 a.m.-4:00 p.m.) questions or concerns may be answered  by calling [facility] Navigation Nurses at  [phone].  Messages received after hours will be returned the following business day.      In the case of an emergency,please dial 911 or seek assistance at the nearest Emergency Room/Urgent Care facility.     All Patients:  You may experience an increase in your symptoms forthe first 5-7 days. Soreness may persist during the first few weeks as it takes 4-6 weeks for the nerves to fully heal.    You may use ice on the injection site,as frequently as 20 minutes each hour if needed. It is recommended NOT to apply heat during the first 24 hours.    You may take your pain medicine.    You may continue taking your regular medication.    You may shower. No swimming, tub bath or hot tub for 48hours. This also includes no lotions, ointments or patches to the needle sites as well. You may remove your bandaid/bandage as soon as you are home.    You mayresume light activities, as tolerated.    Resume your usual diet as tolerated.    It is strongly advisedthat you do not drive for 1-3 hours post injection.    If you have had oral sedation:  Do not drive for 8 hours post injection.             POSSIBLE PROCEDURE SIDE EFFECTS    -Call the [facility] Center if you are concerned    IncreasedPain           Increased numbness/tingling      Nausea/Vomiting          Bruising/bleeding at site      Redness or swelling  Difficulty walking      Weakness          Fever greater than 100.5

## 2023-05-01 RX ORDER — FOLIC ACID 1 MG/1
2 TABLET ORAL DAILY
Qty: 180 TABLET | Refills: 3 | Status: SHIPPED | OUTPATIENT
Start: 2023-05-01 | End: 2024-02-08

## 2023-05-01 NOTE — TELEPHONE ENCOUNTER
Script sent for Folic Acid dose increase to pharmacy.   Smitha DORADO RN BSN PHN  [facility]

## 2023-06-26 ENCOUNTER — LAB (OUTPATIENT)
Dept: LAB | Facility: CLINIC | Age: 53
End: 2023-06-26
Payer: COMMERCIAL

## 2023-06-26 DIAGNOSIS — Z79.899 HIGH RISK MEDICATION USE: ICD-10-CM

## 2023-06-26 DIAGNOSIS — M05.9 SEROPOSITIVE RHEUMATOID ARTHRITIS (H): ICD-10-CM

## 2023-06-26 LAB
ALBUMIN SERPL BCG-MCNC: 4.9 G/DL (ref 3.5–5.2)
ALT SERPL W P-5'-P-CCNC: 39 U/L (ref 0–70)
AST SERPL W P-5'-P-CCNC: 24 U/L (ref 0–45)
CREAT SERPL-MCNC: 1.15 MG/DL (ref 0.67–1.17)
CRP SERPL-MCNC: <3 MG/L
ERYTHROCYTE [DISTWIDTH] IN BLOOD BY AUTOMATED COUNT: 13.7 % (ref 10–15)
ERYTHROCYTE [SEDIMENTATION RATE] IN BLOOD BY WESTERGREN METHOD: 41 MM/HR (ref 0–20)
GFR SERPL CREATININE-BSD FRML MDRD: 75 ML/MIN/1.73M2
HCT VFR BLD AUTO: 41 % (ref 40–53)
HGB BLD-MCNC: 14.1 G/DL (ref 13.3–17.7)
MCH RBC QN AUTO: 31.6 PG (ref 26.5–33)
MCHC RBC AUTO-ENTMCNC: 34.4 G/DL (ref 31.5–36.5)
MCV RBC AUTO: 92 FL (ref 78–100)
PLATELET # BLD AUTO: 207 10E3/UL (ref 150–450)
RBC # BLD AUTO: 4.46 10E6/UL (ref 4.4–5.9)
WBC # BLD AUTO: 7.2 10E3/UL (ref 4–11)

## 2023-06-26 PROCEDURE — 82040 ASSAY OF SERUM ALBUMIN: CPT

## 2023-06-26 PROCEDURE — 36415 COLL VENOUS BLD VENIPUNCTURE: CPT

## 2023-06-26 PROCEDURE — 85027 COMPLETE CBC AUTOMATED: CPT

## 2023-06-26 PROCEDURE — 84450 TRANSFERASE (AST) (SGOT): CPT

## 2023-06-26 PROCEDURE — 84460 ALANINE AMINO (ALT) (SGPT): CPT

## 2023-06-26 PROCEDURE — 86140 C-REACTIVE PROTEIN: CPT

## 2023-06-26 PROCEDURE — 85652 RBC SED RATE AUTOMATED: CPT

## 2023-06-26 PROCEDURE — 82565 ASSAY OF CREATININE: CPT

## 2023-06-27 DIAGNOSIS — E29.1 HYPOGONADISM MALE: Primary | ICD-10-CM

## 2023-06-28 RX ORDER — TESTOSTERONE CYPIONATE 200 MG/ML
50 INJECTION, SOLUTION INTRAMUSCULAR WEEKLY
Qty: 3 ML | Refills: 0 | Status: SHIPPED | OUTPATIENT
Start: 2023-06-28 | End: 2023-08-31

## 2023-06-28 NOTE — TELEPHONE ENCOUNTER
Refill given, but patient needs lab work and a BP check. Please schedule RN BP check and lab appointment for patient.     Jace Campbell PA-C

## 2023-06-29 ENCOUNTER — MYC REFILL (OUTPATIENT)
Dept: RHEUMATOLOGY | Facility: CLINIC | Age: 53
End: 2023-06-29
Payer: COMMERCIAL

## 2023-06-29 ENCOUNTER — DOCUMENTATION ONLY (OUTPATIENT)
Dept: LAB | Facility: CLINIC | Age: 53
End: 2023-06-29
Payer: COMMERCIAL

## 2023-06-29 ENCOUNTER — MYC MEDICAL ADVICE (OUTPATIENT)
Dept: RHEUMATOLOGY | Facility: CLINIC | Age: 53
End: 2023-06-29
Payer: COMMERCIAL

## 2023-06-29 DIAGNOSIS — Z79.899 HIGH RISK MEDICATION USE: ICD-10-CM

## 2023-06-29 DIAGNOSIS — M05.9 SEROPOSITIVE RHEUMATOID ARTHRITIS (H): ICD-10-CM

## 2023-06-29 DIAGNOSIS — E29.1 HYPOGONADISM MALE: Primary | ICD-10-CM

## 2023-06-29 NOTE — TELEPHONE ENCOUNTER
Mary Sargent PA-C  You 1 minute ago (10:44 AM)     MS  Adis for yearly visits as long as he is stable. Labs have to be every 3 months still with the methotrexate.     Mary Sargent PA-C on 6/29/2023 at 10:44 AM

## 2023-06-29 NOTE — TELEPHONE ENCOUNTER
Prescription already filled on other encounter    Monik EVANGELISTA RN  [facility]

## 2023-06-29 NOTE — PROGRESS NOTES
Patient is coming in for labs July 3rd Testosterone level. Please place order if needed.  Thank you!!

## 2023-06-29 NOTE — TELEPHONE ENCOUNTER
LOV 3/2023: Seropositive rheumatoid arthritis   - Methotrexate 8 tabs weekly.   Patient is due for follow-up : Will encourage him to schedule, as he is overdue at this point and no-showed his June appointment.   Had labs drawn 6/26 and methotrexate refilled 6/29     For future financial planning: Will he get to yearly appts at any time during treatment, assuming he has no new concerns arise, or acute issues?     Thank you,   Monik EVANGELISTA RN  [facility]

## 2023-07-13 RX ORDER — BISACODYL 5 MG/1
TABLET, DELAYED RELEASE ORAL
Qty: 4 TABLET | Refills: 0 | Status: SHIPPED | OUTPATIENT
Start: 2023-07-13 | End: 2023-09-07 | Stop reason: HOSPADM

## 2023-07-21 ENCOUNTER — ANESTHESIA EVENT (OUTPATIENT)
Dept: GASTROENTEROLOGY | Facility: CLINIC | Age: 53
End: 2023-07-21
Payer: COMMERCIAL

## 2023-07-21 RX ORDER — ONDANSETRON 2 MG/ML
4 INJECTION INTRAMUSCULAR; INTRAVENOUS EVERY 30 MIN PRN
Status: CANCELLED | OUTPATIENT
Start: 2023-07-21

## 2023-07-21 RX ORDER — ONDANSETRON 4 MG/1
4 TABLET, ORALLY DISINTEGRATING ORAL EVERY 30 MIN PRN
Status: CANCELLED | OUTPATIENT
Start: 2023-07-21

## 2023-07-21 RX ORDER — SODIUM CHLORIDE, SODIUM LACTATE, POTASSIUM CHLORIDE, CALCIUM CHLORIDE 600; 310; 30; 20 MG/100ML; MG/100ML; MG/100ML; MG/100ML
INJECTION, SOLUTION INTRAVENOUS CONTINUOUS
Status: CANCELLED | OUTPATIENT
Start: 2023-07-21

## 2023-07-21 RX ORDER — ALBUTEROL SULFATE 0.83 MG/ML
2.5 SOLUTION RESPIRATORY (INHALATION) EVERY 4 HOURS PRN
Status: CANCELLED | OUTPATIENT
Start: 2023-07-21

## 2023-07-21 ASSESSMENT — LIFESTYLE VARIABLES: TOBACCO_USE: 1

## 2023-07-21 NOTE — ANESTHESIA PREPROCEDURE EVALUATION
Anesthesia Pre-Procedure Evaluation    Patient: Robson Galeano   MRN: [id number] : 1970        Procedure : Procedure(s):  Colonoscopy          History reviewed. No pertinent past medical history.   History reviewed. No pertinent surgical history.   Allergies   Allergen Reactions     Bee Venom Other (See Comments)     Hallucinates, no anaphylaxis  Hallucinates, no anaphylaxis  Hallucinates, no anaphylaxis        Social History     Tobacco Use     Smoking status: Former     Packs/day: 0.50     Types: Cigarettes     Quit date: 2002     Years since quittin.5     Smokeless tobacco: Never     Tobacco comments:     Has medical marijuana card   Substance Use Topics     Alcohol use: Not on file      Wt Readings from Last 1 Encounters:   23 99.1 kg (218 lb 6.4 oz)        Anesthesia Evaluation            ROS/MED HX  ENT/Pulmonary:     (+) tobacco use, Past use,     Neurologic:     (+) migraines,     Cardiovascular:       METS/Exercise Tolerance:     Hematologic:       Musculoskeletal: Comment: rheumatoid  (+) arthritis,     GI/Hepatic:       Renal/Genitourinary:     (+) BPH,     Endo: Comment: Low testoterone    (+) Obesity,     Psychiatric/Substance Use:       Infectious Disease:       Malignancy:       Other:      (+) , H/O Chronic Pain,           OUTSIDE LABS:  CBC:   Lab Results   Component Value Date    WBC 7.2 2023    WBC 4.6 2023    HGB 14.1 2023    HGB 14.4 2023    HCT 41.0 2023    HCT 41.9 2023     2023     2023     BMP:   Lab Results   Component Value Date     2023    POTASSIUM 3.6 2023    CHLORIDE 107 2023    CO2 28 2023    BUN 16.5 2023    CR 1.15 2023    CR 1.13 2023     (H) 2023     COAGS: No results found for: PTT, INR, FIBR  POC: No results found for: BGM, HCG, HCGS  HEPATIC:   Lab Results   Component Value Date    ALBUMIN 4.9 2023    PROTTOTAL 7.1 2023     ALT 39 06/26/2023    AST 24 06/26/2023    ALKPHOS 90 03/02/2023    BILITOTAL 0.6 03/02/2023     OTHER:   Lab Results   Component Value Date    A1C 5.6 03/02/2023    JEF 9.3 03/02/2023    SED 41 (H) 06/26/2023               Marya Chris, ANDREW CRNA

## 2023-07-25 ENCOUNTER — ANESTHESIA (OUTPATIENT)
Dept: GASTROENTEROLOGY | Facility: CLINIC | Age: 53
End: 2023-07-25
Payer: COMMERCIAL

## 2023-08-02 ENCOUNTER — MYC REFILL (OUTPATIENT)
Dept: FAMILY MEDICINE | Facility: CLINIC | Age: 53
End: 2023-08-02
Payer: COMMERCIAL

## 2023-08-02 DIAGNOSIS — A60.00 GENITAL HERPES SIMPLEX, UNSPECIFIED SITE: ICD-10-CM

## 2023-08-02 RX ORDER — VALACYCLOVIR HYDROCHLORIDE 1 G/1
1000 TABLET, FILM COATED ORAL DAILY
Qty: 5 TABLET | Refills: 5 | Status: SHIPPED | OUTPATIENT
Start: 2023-08-02 | End: 2024-03-04

## 2023-08-17 ENCOUNTER — LAB (OUTPATIENT)
Dept: LAB | Facility: CLINIC | Age: 53
End: 2023-08-17
Payer: COMMERCIAL

## 2023-08-17 DIAGNOSIS — M05.9 SEROPOSITIVE RHEUMATOID ARTHRITIS (H): ICD-10-CM

## 2023-08-17 DIAGNOSIS — Z79.899 HIGH RISK MEDICATION USE: ICD-10-CM

## 2023-08-17 DIAGNOSIS — E29.1 HYPOGONADISM MALE: ICD-10-CM

## 2023-08-17 LAB
ALBUMIN SERPL BCG-MCNC: 4.7 G/DL (ref 3.5–5.2)
ALT SERPL W P-5'-P-CCNC: 52 U/L (ref 0–70)
AST SERPL W P-5'-P-CCNC: 30 U/L (ref 0–45)
CREAT SERPL-MCNC: 0.99 MG/DL (ref 0.67–1.17)
CRP SERPL-MCNC: <3 MG/L
ERYTHROCYTE [DISTWIDTH] IN BLOOD BY AUTOMATED COUNT: 12.7 % (ref 10–15)
ERYTHROCYTE [SEDIMENTATION RATE] IN BLOOD BY WESTERGREN METHOD: 31 MM/HR (ref 0–20)
GFR SERPL CREATININE-BSD FRML MDRD: 90 ML/MIN/1.73M2
HCT VFR BLD AUTO: 43 % (ref 40–53)
HGB BLD-MCNC: 14.7 G/DL (ref 13.3–17.7)
MCH RBC QN AUTO: 31 PG (ref 26.5–33)
MCHC RBC AUTO-ENTMCNC: 34.2 G/DL (ref 31.5–36.5)
MCV RBC AUTO: 91 FL (ref 78–100)
PLATELET # BLD AUTO: 173 10E3/UL (ref 150–450)
PSA SERPL DL<=0.01 NG/ML-MCNC: 0.95 NG/ML (ref 0–3.5)
RBC # BLD AUTO: 4.74 10E6/UL (ref 4.4–5.9)
SHBG SERPL-SCNC: 23 NMOL/L (ref 11–80)
WBC # BLD AUTO: 5.7 10E3/UL (ref 4–11)

## 2023-08-17 PROCEDURE — 86140 C-REACTIVE PROTEIN: CPT

## 2023-08-17 PROCEDURE — 85027 COMPLETE CBC AUTOMATED: CPT

## 2023-08-17 PROCEDURE — 36415 COLL VENOUS BLD VENIPUNCTURE: CPT

## 2023-08-17 PROCEDURE — 84270 ASSAY OF SEX HORMONE GLOBUL: CPT

## 2023-08-17 PROCEDURE — 82565 ASSAY OF CREATININE: CPT

## 2023-08-17 PROCEDURE — 85652 RBC SED RATE AUTOMATED: CPT

## 2023-08-17 PROCEDURE — 84460 ALANINE AMINO (ALT) (SGPT): CPT

## 2023-08-17 PROCEDURE — 84450 TRANSFERASE (AST) (SGOT): CPT

## 2023-08-17 PROCEDURE — 84153 ASSAY OF PSA TOTAL: CPT

## 2023-08-17 PROCEDURE — 82040 ASSAY OF SERUM ALBUMIN: CPT

## 2023-08-17 PROCEDURE — 84403 ASSAY OF TOTAL TESTOSTERONE: CPT

## 2023-08-21 LAB
TESTOST FREE SERPL-MCNC: 5.85 NG/DL
TESTOST SERPL-MCNC: 249 NG/DL (ref 240–950)

## 2023-08-31 DIAGNOSIS — E29.1 HYPOGONADISM MALE: ICD-10-CM

## 2023-08-31 RX ORDER — TESTOSTERONE CYPIONATE 200 MG/ML
100 INJECTION, SOLUTION INTRAMUSCULAR WEEKLY
Qty: 2 ML | Refills: 4 | Status: SHIPPED | OUTPATIENT
Start: 2023-08-31 | End: 2023-10-23

## 2023-09-18 DIAGNOSIS — Z79.899 HIGH RISK MEDICATION USE: ICD-10-CM

## 2023-09-18 DIAGNOSIS — M05.9 SEROPOSITIVE RHEUMATOID ARTHRITIS (H): ICD-10-CM

## 2023-09-19 NOTE — TELEPHONE ENCOUNTER
Methotrexate refilled. Patient should schedule a follow up with me in the next month.     Mary Sargent PA-C on 9/19/2023 at 12:52 PM

## 2023-10-11 NOTE — PROGRESS NOTES
Rheumatology Clinic Visit  [facility]  ALVARADO Bunch     Robson Galeano MRN# [id number]   YOB: 1970 Age: 53 year old   Date of Visit: 10/17/2023  Primary care provider: Jace Campbell          Assessment and Plan:     1.  Seropositive rheumatoid arthritis  2.  High-risk medication use      Patient presents today for follow up for his seropositive rheumatoid arthritis.  He states that he has not been doing very well and that his hands have still been swelling and stiff particularly the right hand.  Physical examination today does show active synovitis over the second third and fourth MCPs on the right.  Previous laboratory evaluations were reviewed, results below.    As the patient continues to have active synovitis with the use of methotrexate as well as having some diarrhea on the day that he takes the methotrexate, would recommend changing therapy.  He will continue on the methotrexate for the time being and can decrease the dose back to 6 tablets weekly given the diarrhea.  He does feel the diarrhea is currently manageable we will continue to monitor.  We will start him on Humira.  Side effects of this medication were reviewed with the patient today.  We will check a hepatitis B and hepatitis C and tuberculosis status today.    Patient does report that he is up-to-date with his vaccinations with the exception of the influenza and COVID-vaccine.  He does plan to get those given the immunosuppression with the Humira.      Plan:       Schedule follow-up with Mary Sargent PA-C in 3 months.   Labs: CBC, creatinine, albumin, AST, ALT, CRP and Sed Rate every 3 months (next due mid November) TB, Hep B and Hep C today  Medication recommendations:  Start Humira 40mg/0.4mL  Every 14 days  # Adalimumab (Humira) Risks and Benefits: The risks and benefits of adalimumab were discussed in detail and the patient verbalized understanding.  The risks discussed include, but are not limited to, the  risk for hypersensitivity, anaphylaxis, anaphylactoid reactions, an increased risk for serious infections leading to hospitalization or death, a possible increased risk for lymphoma and other malignancies, a possible worsening of demyelinating diseases, a possible worsening of heart failure, risk for cytopenias, risk for drug induced lupus, possible reactivation of hepatitis B, and possible reactivation of latent tuberculosis.  Subcutaneous injections may result in injection site reactions and/or pain at the site of injection.  The most common adverse reactions are infections, injection site reactions, headache, and rash.  It was discussed that the medication would need to be discontinued if a serious infection develops.  It was discussed that live vaccinations should not be received while using adalimumab or within 30 days prior to starting adalimumab.  I encouraged reviewing the package insert and asking any questions about the medication.    Methotrexate: Okay to go back to 6 tablets weekly. While on Methotrexate:  -check labs (ALT/AST, albumin, CBC with platelets and creatinine) every 3 months  -Limit alcohol to 2 drinks weekly  -Take Folic Acid 2mg daily   -Tylenol 500-1000mg can be used as needed up to three times daily for nausea/headache associated with dosing  If you get the Flu vaccine, hold methotrexate for 2 weeks AFTER     ALVARADO Bunch  Rheumatology         History of Present Illness:   Robson Galeano presents for evaluation of rheumatoid arthritis.      Rheumatological history:  Provider(s): Dr. Jackson, Dr. Calhoun  Last office visit: 04/19/2021  Pertinent lab history:  +CCP (251), Neg RF  Previous medications tried: NSAIDs, Methotrexate  Current medications: none    Interval history October 17, 2023:  He gets diarrhea on the days that the takes his Methotrexate. He feels it is manageable. He has not taken any imodium. It does seem to relate to what he eats as well. He states that his hands have  been swelling up. With working a lot the right hand/wrist gets much worse. He continues to have 1 hour of stiffness in the mornings.       PHI from consult of March 15, 2023:  Was diagnosed in 2019. At the time of diagnosis, his hands, elbows and shoulders were bothering him. He states previously getting injections. He does have back pain as well. He has been off of methotrexate for 5-6 months. He has tried medical cannabis, which helps. He was having relief with the methotrexate. He was taking it as a split dose. He has continued on the folic acid.     No infection. No personal history of cancer. No cardiac history, including high blood pressure high cholesterol. He drinks 4-5 beers/week.                Review of Systems:     Constitutional: negative  Skin: negative  Eyes: negative  Ears/Nose/Throat: negative  Respiratory: No shortness of breath, dyspnea on exertion, cough, or hemoptysis  Cardiovascular: negative  Gastrointestinal: negative  Genitourinary: negative  Musculoskeletal: as above  Neurologic: negative  Psychiatric: negative  Hematologic/Lymphatic/Immunologic: negative  Endocrine: negative         Active Problem List:     Patient Active Problem List    Diagnosis Date Noted    Chronic bilateral low back pain without sciatica 03/02/2023     Priority: Medium    Benign prostatic hyperplasia, unspecified whether lower urinary tract symptoms present 03/02/2023     Priority: Medium    Hypogonadism male 07/30/2021     Priority: Medium    Non-recurrent unilateral inguinal hernia without obstruction or gangrene 01/26/2021     Priority: Medium    Seropositive rheumatoid arthritis (H) 12/28/2019     Priority: Medium    Ocular migraine 08/31/2012     Priority: Medium     Formatting of this note might be different from the original.  Rare-last 10-11      Morbid obesity (H) 08/31/2012     Priority: Medium     Formatting of this note might be different from the original.              Past Medical History:   No past  "medical history on file.  No past surgical history on file.         Social History:     Social History     Socioeconomic History    Marital status:      Spouse name: Not on file    Number of children: Not on file    Years of education: Not on file    Highest education level: Not on file   Occupational History    Not on file   Tobacco Use    Smoking status: Former     Packs/day: .5     Types: Cigarettes     Quit date: 2002     Years since quittin.7    Smokeless tobacco: Never    Tobacco comments:     Has medical marijuana card   Vaping Use    Vaping Use: Never used   Substance and Sexual Activity    Alcohol use: Not on file    Drug use: Not on file    Sexual activity: Not on file   Other Topics Concern    Not on file   Social History Narrative    Not on file     Social Determinants of Health     Financial Resource Strain: Not on file   Food Insecurity: Not on file   Transportation Needs: Not on file   Physical Activity: Not on file   Stress: Not on file   Social Connections: Not on file   Interpersonal Safety: Not on file   Housing Stability: Not on file          Family History:   No family history on file.         Allergies:     Allergies   Allergen Reactions    Bee Venom Other (See Comments)     Hallucinates, no anaphylaxis  Hallucinates, no anaphylaxis  Hallucinates, no anaphylaxis              Medications:     Current Outpatient Medications   Medication Sig Dispense Refill    amitriptyline (ELAVIL) 10 MG tablet Take 1 tablet (10 mg) by mouth At Bedtime 90 tablet 3    folic acid (FOLVITE) 1 MG tablet Take 2 tablets (2 mg) by mouth daily 180 tablet 3    gabapentin (NEURONTIN) 300 MG capsule Take 1 capsule (300 mg) by mouth 2 times daily 180 capsule 3    methotrexate 2.5 MG tablet Take 8 tablets weekly. Labs every 8 - 12 weeks for refills. 96 tablet 0    Needle, Disp, (B-D HYPODERMIC NEEDLE) 18G X 1-1/2\" MISC 1 each once a week 12 each 3    Needle, Disp, (B-D HYPODERMIC NEEDLE) 22G X 1\" MISC 1 each " "once a week 12 each 3    Syringe Luer Slip (B-D SYRINGE LUER-MARIALUISA) 3 ML MISC 1 each once a week 12 each 3    tamsulosin (FLOMAX) 0.4 MG capsule Take 1 capsule (0.4 mg) by mouth daily 90 capsule 3    testosterone cypionate (DEPOTESTOSTERONE) 200 MG/ML injection Inject 0.5 mLs (100 mg) into the muscle once a week 2 mL 4    triamcinolone (KENALOG) 0.1 % external ointment Apply topically 2 times daily Tube should last 3 months. 80 g 1    valACYclovir (VALTREX) 1000 mg tablet Take 1 tablet (1,000 mg) by mouth daily 5 tablet 5            Physical Exam:   Blood pressure 123/87, pulse 71, resp. rate 18, height 1.702 m (5' 7\"), weight 99.7 kg (219 lb 14.4 oz), SpO2 94%.  Wt Readings from Last 6 Encounters:   04/07/23 99.1 kg (218 lb 6.4 oz)   04/06/23 99.8 kg (220 lb 1.6 oz)   03/15/23 98.9 kg (218 lb)   03/02/23 98.9 kg (218 lb)     Constitutional: well-developed, appearing stated age; cooperative  Eyes: nl conjunctiva, sclera  ENT: nl external ears, nose, hearing, lips,  Neck: no visible mass or thyroid enlargement  Resp: No shortness of breath with normal conversation  MS: He does have active synovitis over  the right second, third and 4th MCP.  Skin: no nail pitting, alopecia, rash, nodules or lesions.   Psych: nl judgement, orientation, memory, affect.           Data:   Imaging:  Xray foot/feet 10/18/2019  FINDINGS:  Single image showing both feet. Bony mineralization within normal limits. Joint spaces appear maintained. No erosions are seen. No abnormal soft tissue calcifications.    Xray hands 10/18/2019  IMPRESSION: Degenerative/arthritic changes in both hands as described. No definite erosions or chondrocalcinosis.    Xray SI joints 10/18/2019  FINDINGS:  Bony structures and sacroiliac joints are unremarkable. The SI joints appear intact without evidence of bony ankylosis or erosive changes.    Laboratory:  3/2/2023  Creatinine 1.13, GFR 77  Albumin 4.6  ALT 47, AST 29  CBC within normal limits  Hepatitis C " nonreactive    8/17/2023  Albumin 4.7  ALT 52, AST 30  CRP less than 3.0  CBC within normal limits  Sed rate 31  Creatinine 0.99, GFR 90

## 2023-10-17 ENCOUNTER — OFFICE VISIT (OUTPATIENT)
Dept: RHEUMATOLOGY | Facility: CLINIC | Age: 53
End: 2023-10-17
Payer: COMMERCIAL

## 2023-10-17 ENCOUNTER — TELEPHONE (OUTPATIENT)
Dept: RHEUMATOLOGY | Facility: CLINIC | Age: 53
End: 2023-10-17

## 2023-10-17 VITALS
SYSTOLIC BLOOD PRESSURE: 123 MMHG | DIASTOLIC BLOOD PRESSURE: 87 MMHG | RESPIRATION RATE: 18 BRPM | HEART RATE: 71 BPM | HEIGHT: 67 IN | BODY MASS INDEX: 34.51 KG/M2 | WEIGHT: 219.9 LBS | OXYGEN SATURATION: 94 %

## 2023-10-17 DIAGNOSIS — M05.9 SEROPOSITIVE RHEUMATOID ARTHRITIS (H): Primary | ICD-10-CM

## 2023-10-17 DIAGNOSIS — Z79.899 HIGH RISK MEDICATION USE: ICD-10-CM

## 2023-10-17 DIAGNOSIS — M05.9 SEROPOSITIVE RHEUMATOID ARTHRITIS (H): ICD-10-CM

## 2023-10-17 LAB
HBV CORE AB SERPL QL IA: NONREACTIVE
HBV SURFACE AG SERPL QL IA: NONREACTIVE
HCV AB SERPL QL IA: NONREACTIVE

## 2023-10-17 PROCEDURE — 87340 HEPATITIS B SURFACE AG IA: CPT | Performed by: PHYSICIAN ASSISTANT

## 2023-10-17 PROCEDURE — 36415 COLL VENOUS BLD VENIPUNCTURE: CPT | Performed by: PHYSICIAN ASSISTANT

## 2023-10-17 PROCEDURE — 99214 OFFICE O/P EST MOD 30 MIN: CPT | Performed by: PHYSICIAN ASSISTANT

## 2023-10-17 PROCEDURE — 86481 TB AG RESPONSE T-CELL SUSP: CPT | Performed by: PHYSICIAN ASSISTANT

## 2023-10-17 PROCEDURE — 86803 HEPATITIS C AB TEST: CPT | Performed by: PHYSICIAN ASSISTANT

## 2023-10-17 PROCEDURE — 86704 HEP B CORE ANTIBODY TOTAL: CPT | Performed by: PHYSICIAN ASSISTANT

## 2023-10-17 ASSESSMENT — PAIN SCALES - GENERAL: PAINLEVEL: MODERATE PAIN (4)

## 2023-10-17 NOTE — PATIENT INSTRUCTIONS
After Visit Instructions:     Thank you for coming to [facility] Rhe[facility]ology for your care. It is my goal to partner with you to help you reach your optimal state of health.     Plan:     Schedule follow-up with Mary Sargent PA-C in 3 months.   Labs: CBC, creatinine, albumin, AST, ALT, CRP and Sed Rate every 3 months (next due mid November) TB, Hep B and Hep C today  Medication recommendations:  Start Humira 40mg/0.4mL  Every 14 days  Methotrexate: Okay to go back to 6 tablets weekly. While on Methotrexate:  -check labs (ALT/AST, albumin, CBC with platelets and creatinine) every 3 months  -Limit alcohol to 2 drinks weekly  -Take Folic Acid 2mg daily   -Tylenol 500-1000mg can be used as needed up to three times daily for nausea/headache associated with dosing  If you get the Flu vaccine, hold methotrexate for 2 weeks AFTER       Mary Sargent PA-C  [facility]  [facility]    Contact information: [facility]  Clinic Number:  [phone]  Please call or send a [employer] message with any questions about your care

## 2023-10-17 NOTE — TELEPHONE ENCOUNTER
Prior Authorization Approval    Medication: HUMIRA *CF* PEN 40 MG/0.4ML SC PNKT  Authorization Effective Date: 9/17/2023  Authorization Expiration Date: 4/14/2024  Approved Dose/Quantity: 2 / 28  Reference #: SILVANO Key: [id number]   Insurance Company: [employer] - Phone [phone] Fax [phone]  Expected CoPay: $ 5  CoPay Card Available: Yes    Financial Assistance Needed: [email or web address]  Which Pharmacy is filling the prescription: [facility] - [place], [place]  [address] [place] [address] [facility]  Pharmacy Notified: yes - new spec pt  Patient Notified: yes - MyChart          Thank You,     Karyna Santa, [facility]  Specialty Pharmacy Clinic [facility] Specialty  [email or web address]  [email or web address]  Phone: [phone]  Fax: [phone]

## 2023-10-17 NOTE — TELEPHONE ENCOUNTER
PA Initiation    Medication: HUMIRA *CF* PEN 40 MG/0.4ML SC PNKT  Insurance Company: [employer] - Phone [phone] Fax [phone]  Pharmacy Filling the Rx: [facility] - [place], [place] - [facility] [address]   Filling Pharmacy Phone: [phone]  Filling Pharmacy Fax: [phone]  Start Date: 10/17/2023  [email or web address]  SILVANO Key: [id number]        Thank You,     Vj Santa [facility]  Specialty Pharmacy Clinic [facility] Specialty  [email or web address]  [email or web address]  Phone: [phone]  Fax: [phone]

## 2023-10-17 NOTE — PROGRESS NOTES
Medication Therapy Management (MTM) Encounter    ASSESSMENT:                            Medication Adherence/Access: See below for considerations    Seropositive Rheumatoid Arthritis:   Provided education on Humira today including dosing, general administration, side effects (both common/serious), precautions, monitoring and time to efficacy. Discussed data on malignancy and risk of serious infection in depth. Encouraged indicated non-live vaccines and avoidance of live vaccines. Discussed potential need to hold therapy in the setting of signs/symptoms of active infection. Encouraged him to contact the rheumatology clinic in the event he has questions on this. Would benefit from starting Humira once it arrives and using every 14 days as directed.  After discussion with rheumatologist, recommend patient limit to 2 beers per week while on methotrexate and maintain methotrexate dose of 6 tablets weekly, patient agrees with recommendation. In future, could consider switching to injectable methotrexate if GI symptoms worsen or become intolerable.     Vaccines: Encouraged indicated non-live vaccines and avoidance of live vaccines. Per ACIP guidelines, patient is eligible for VACCINATION: Covid-19 and Influenza. Informed patient of recommended vaccinations given age and comorbid conditions. Recommend patient hold methotrexate as able for 14 days following flu vaccination.     BPH: Recommend patient switch to evening administration of tamsulosin to reduce orthostatic hypotension.    Back Pain: Well-controlled per patient.     Dermatitis:Stable    Herpes Simplex: May benefit from changing to prophylaxis dosing of valacyclovir once he is started on Humira. Will have patient follow up with PCP regarding this.     Hypogonadism: Stable     PLAN:                            Start Humira 40 mg subcutaneously every 14 days.  Administration Video:  [email or web address]_ppd_ggl_humira_complete_humira_pen_injection_video_injection_[email or web address].ds   Humira Complete: [email or web address]    2. A common side effect of Humira is injection site reactions (red, raised, itchy spot at injection site). You can use hydrocortisone cream and ice to treat these reactions if they occur.     3. Vaccine recommendations: updated COVID vaccine and annual flu vaccine        a.Hold methotrexate for 2 weeks after flu shot (if disease activity allows)    4. If you want to see a dermatologist for annual skin checks, please ask your primary care doctor for a referral.    5. Start taking tamsulosin in the evening to reduce the amount of dizziness with positional changes.    6. Discuss going back on low dose prevention valacyclovir doses for cold sores with primary care provider.    7. Recommend limiting alcohol intake to 2 drinks per week, if you are unable to do this, please contact Mary.     Follow-up: with Robson Stevens 2/8/2024    SUBJECTIVE/OBJECTIVE:                          Robson Galeano is a 53 year old male called for an initial visit. He was referred to me from Mary Sargent PA-C. Patient was accompanied by his wife Munir.     Reason for visit: Initial MTM; Humira Start.    Allergies/ADRs: Reviewed in chart  Past Medical History: Reviewed in chart  Tobacco: He reports that he quit smoking about 21 years ago. His smoking use included cigarettes. He smoked an average of .5 packs per day. He has never used smokeless tobacco.  Alcohol: currently drinking a few beers a day. Could stop drinking if absolutely necessary, but would prefer to continue drinking beers.   Other Substance Use: Cannabis gummies, flower, and balm.     Medication Adherence/Access: Patient's wife does assist with medications, specifically testosterone injections. She will also  be helping with Humira injections once those are delivered.  Does occasionally missing doses of his medications especially when he goes out of town. Does use a twice daily pillbox.     Seropositive Rheumatoid Arthritis:   Humira 40mg every 14 days. He has not yet started Humira yet but it will be delivered to their house tomorrow.    Methotrexate 2.5mg 8 tablets weekly on Mondays. He splits this and takes 4 tablets twice daily. He was informed by his rheumatologist he could decrease to 6 tablets but has not yet made this change.  Folic acid 2mg every day   - Not currently having a flare. Does note that most of his symptoms are in his wrists.   - Does not like how the methotrexate, he notes a lot of GI upset and diarrhea that lasts about 24 hours. This is tolerable for now.     Reviewed baseline pre-biologic screening.   Hep C antibody non-reactive  Hep B surface antibody not completed  Hep B surface antigen non-reactive  Hep B core antibody non-reactive  Quantiferon TB negative  HIV antigen not completed    Vaccines:   - Reviewed vaccination [facility] in [facility].   - Has not yet received his flu vaccine or updated COVID-19 vaccine.      BPH:   Tamsulosin 0.4mg every morning  - Denies medication side effects  - Has occasional orthostatic hypotension  - Notes he has had great improvement in nocturia. He is only getting up once a night now instead of 6 times.     Back Pain:   Gabapentin 300mg twice daily   Amitriptyline 10mg at bedtime   Cannabis gummies, flower, and balm.   - Has not needed to use opioid in the last 9 months since using cannabis.  - Finds the cannabis to be extremely helpful    Dermatitis:  Triamcinolone 0.1% cream twice daily as needed  - Very infrequent use. Has a rash on his hands, potentially psoriasis. Notes the rash is mild.     Herpes Simplex:   Valalcyclovir 1000mg every day as needed  - Has outbreaks with change in seasons and during times of high stress.  - Last outbreak was last week. He uses no  more than 4 or 5 days at a time. Finds this to be very effective.   - Was historically taking a lower dose daily and felt his cold sores were better controlled with this dosing.   - Having about 3 breakouts in the past month    Hypogonadism:   Testosterone cypionate 100mg weekly on Fridays  - No concerns reported. Wife helps with administration.     Today's Vitals: There were no vitals taken for this visit.  ----------------    I spent 44 minutes with this patient today. All changes were made via verbal approval with Mary Sargent. A copy of the visit note was provided to the patient's provider(s).    A summary of these recommendations was sent via [employer].    Robson Stevens, PharmD  Medication Therapy Management Pharmacist  [facility]  [phone]     Jordyn Quick, Pharm.D., MPH  Medication Therapy Management Pharmacist   [facility]    Telemedicine Visit Details  Type of service:  Telephone visit  Start Time:  10:30AM  End Time: 11:14 AM     Medication Therapy Recommendations  Benign prostatic hyperplasia, unspecified whether lower urinary tract symptoms present    Current Medication: tamsulosin (FLOMAX) 0.4 MG capsule   Rationale: Undesirable effect - Adverse medication event - Safety   Recommendation: Provide Education   Status: Patient Agreed - Adherence/Education   Note: Recommended patient switch tamsulosin at bedtime to reduce daytime dizziness         Seropositive rheumatoid arthritis (H)    Current Medication: adalimumab (HUMIRA *CF*) 40 MG/0.4ML pen kit   Rationale: Does not understand instructions - Adherence - Adherence   Recommendation: Provide Education   Status: Patient Agreed - Adherence/Education          Current Medication: methotrexate 2.5 MG tablet   Rationale: Does not understand instructions - Adherence - Adherence   Recommendation: Provide Education   Status: Accepted per Provider   Note: discussed with rheumatologist, max alcoholic  2 drinks per week  education also provided on new dose of mtx per rheum's note         Vaccine counseling    Rationale: Preventive therapy - Needs additional medication therapy - Indication   Status: Patient Agreed - Adherence/Education

## 2023-10-18 LAB
GAMMA INTERFERON BACKGROUND BLD IA-ACNC: 0 IU/ML
M TB IFN-G BLD-IMP: NEGATIVE
M TB IFN-G CD4+ BCKGRND COR BLD-ACNC: 8.82 IU/ML
MITOGEN IGNF BCKGRD COR BLD-ACNC: 0.01 IU/ML
MITOGEN IGNF BCKGRD COR BLD-ACNC: 0.01 IU/ML
QUANTIFERON MITOGEN: 8.82 IU/ML
QUANTIFERON NIL TUBE: 0 IU/ML
QUANTIFERON TB1 TUBE: 0.01 IU/ML
QUANTIFERON TB2 TUBE: 0.01

## 2023-10-19 ENCOUNTER — VIRTUAL VISIT (OUTPATIENT)
Dept: PALLIATIVE MEDICINE | Facility: OTHER | Age: 53
End: 2023-10-19
Attending: PHYSICIAN ASSISTANT
Payer: COMMERCIAL

## 2023-10-19 DIAGNOSIS — M54.50 CHRONIC BILATERAL LOW BACK PAIN WITHOUT SCIATICA: ICD-10-CM

## 2023-10-19 DIAGNOSIS — E29.1 HYPOGONADISM MALE: ICD-10-CM

## 2023-10-19 DIAGNOSIS — G89.29 CHRONIC BILATERAL LOW BACK PAIN WITHOUT SCIATICA: ICD-10-CM

## 2023-10-19 DIAGNOSIS — N40.0 BENIGN PROSTATIC HYPERPLASIA, UNSPECIFIED WHETHER LOWER URINARY TRACT SYMPTOMS PRESENT: ICD-10-CM

## 2023-10-19 DIAGNOSIS — B00.9 HSV (HERPES SIMPLEX VIRUS) INFECTION: ICD-10-CM

## 2023-10-19 DIAGNOSIS — L30.9 DERMATITIS: ICD-10-CM

## 2023-10-19 DIAGNOSIS — M05.9 SEROPOSITIVE RHEUMATOID ARTHRITIS (H): Primary | ICD-10-CM

## 2023-10-19 DIAGNOSIS — Z71.85 VACCINE COUNSELING: ICD-10-CM

## 2023-10-19 NOTE — PATIENT INSTRUCTIONS
"Recommendations from today's MTM visit:                                                    MTM (medication therapy management) is a service provided by a clinical pharmacist designed to help you get the most of out of your medicines.   Today we reviewed what your medicines are for, how to know if they are working, that your medicines are safe and how to make your medicine regimen as easy as possible.      Start Humira 40 mg subcutaneously every 14 days.  Administration Video: [email or web address]?[email or web address]_ppd_ggl_humira_complete_humira_pen_injection_video_injection_exact_[email or web address].[email or web address]   Humira Complete: [email or web address]    2. A common side effect of Humira is injection site reactions (red, raised, itchy spot at injection site). You can use hydrocortisone cream and ice to treat these reactions if they occur.     3. Vaccine recommendations: updated COVID vaccine and annual flu vaccine        a. Hold methotrexate for 2 weeks after flu shot (if disease activity allows)     4. If you want to see a dermatologist for annual skin checks, please ask your primary care doctor for a referral.    5. Start taking tamsulosin in the evening to reduce the amount of dizziness with positional changes.    6. Discuss going back on low dose prevention valacyclovir doses for cold sores with primary care provider.    Follow-up: 2/8/2024 at 10:30 AM for follow up with Robson Stevens    It was great speaking with you today.  I value your experience and would be very thankful for your time in providing feedback in our clinic survey. In the next few days, you may receive an email or text message from [employer] with a link to a survey related to your  clinical pharmacist.\"     To schedule another MTM appointment, please call the clinic directly or you may call the MTM scheduling line at [phone] or toll-free at " [phone].     My Clinical Pharmacist's contact information:                                                      Please feel free to contact me with any questions or concerns you have.      Robson Stevens, PharmD  Medication Therapy Management Pharmacist  [facility]  [phone]    Jordyn Quick Pharm.D., MPH  Medication Therapy Management Pharmacist   [facility]  [phone]

## 2023-10-23 ENCOUNTER — MYC MEDICAL ADVICE (OUTPATIENT)
Dept: FAMILY MEDICINE | Facility: CLINIC | Age: 53
End: 2023-10-23
Payer: COMMERCIAL

## 2023-10-23 DIAGNOSIS — E29.1 HYPOGONADISM MALE: ICD-10-CM

## 2023-10-24 ENCOUNTER — HOSPITAL ENCOUNTER (OUTPATIENT)
Dept: CT IMAGING | Facility: HOSPITAL | Age: 53
Discharge: HOME | End: 2023-10-24
Attending: NURSE PRACTITIONER | Admitting: NURSE PRACTITIONER
Payer: COMMERCIAL

## 2023-10-24 ENCOUNTER — VIRTUAL VISIT (OUTPATIENT)
Dept: FAMILY MEDICINE | Facility: CLINIC | Age: 53
End: 2023-10-24
Payer: COMMERCIAL

## 2023-10-24 DIAGNOSIS — R19.7 DIARRHEA, UNSPECIFIED TYPE: ICD-10-CM

## 2023-10-24 DIAGNOSIS — R19.5 DARK RED STOOL: ICD-10-CM

## 2023-10-24 DIAGNOSIS — R10.32 LLQ ABDOMINAL PAIN: ICD-10-CM

## 2023-10-24 DIAGNOSIS — L98.9 SKIN LESION: ICD-10-CM

## 2023-10-24 DIAGNOSIS — R10.32 LLQ ABDOMINAL PAIN: Primary | ICD-10-CM

## 2023-10-24 DIAGNOSIS — M05.9 SEROPOSITIVE RHEUMATOID ARTHRITIS (H): ICD-10-CM

## 2023-10-24 DIAGNOSIS — A60.00 GENITAL HERPES SIMPLEX, UNSPECIFIED SITE: ICD-10-CM

## 2023-10-24 PROCEDURE — 250N000011 HC RX IP 250 OP 636: Mod: JZ | Performed by: NURSE PRACTITIONER

## 2023-10-24 PROCEDURE — 74177 CT ABD & PELVIS W/CONTRAST: CPT

## 2023-10-24 PROCEDURE — 99214 OFFICE O/P EST MOD 30 MIN: CPT | Mod: VID | Performed by: NURSE PRACTITIONER

## 2023-10-24 RX ORDER — VALACYCLOVIR HYDROCHLORIDE 500 MG/1
500 TABLET, FILM COATED ORAL DAILY
Qty: 90 TABLET | Refills: 3 | Status: SHIPPED | OUTPATIENT
Start: 2023-10-24

## 2023-10-24 RX ORDER — IOPAMIDOL 755 MG/ML
90 INJECTION, SOLUTION INTRAVASCULAR ONCE
Status: COMPLETED | OUTPATIENT
Start: 2023-10-24 | End: 2023-10-24

## 2023-10-24 RX ORDER — TESTOSTERONE CYPIONATE 200 MG/ML
100 INJECTION, SOLUTION INTRAMUSCULAR WEEKLY
Qty: 2 ML | Refills: 4 | Status: SHIPPED | OUTPATIENT
Start: 2023-10-24 | End: 2024-02-09 | Stop reason: ALTCHOICE

## 2023-10-24 RX ADMIN — IOPAMIDOL 90 ML: 755 INJECTION, SOLUTION INTRAVENOUS at 17:48

## 2023-10-24 NOTE — PATIENT INSTRUCTIONS
LLQ abdominal pain  Diarrhea, unspecified type  Dark red stool  Patient reports significant left lower quadrant abdominal pain over the weekend accompanied by several bouts of diarrhea with dark red stool.  Denies any nausea, had 1 episode of vomiting on Sunday, none since.  Denies any fever, however reports some hot flashes.  Denies any history of acid reflux symptoms, possible ulcer in the past.  Denies any constipation prior to onset of diarrhea.  Left lower quadrant tenderness when patient palpates, is slightly better than the weekend.  Had previous colonoscopy in 2018 which was normal, has upcoming colonoscopy in December due to family history of colon cancer.  Given symptoms, recommend stat abdominal CT today.  Patient will be scheduled and notified of time.  Advised to stick to a bland even possibly a clear liquid diet for now.  We will call with results and further recommendations.  If negative, will likely have patient obtain stool samples.  - CT Abdomen w/o Contrast; Future    Seropositive rheumatoid arthritis (H)  Chronic, recently established with rheumatology.  Will be starting Humira.  We will need to see dermatology for full skin check due to multiple lesions and family history of skin cancer and side effects of Humira.  - Adult Dermatology  Referral; Future    Skin lesion  Patient has a history of multiple skin lesions, skin takes.  We will be starting Humira per rheumatology.  Needs full body check and monitoring while on treatment.  Referral placed.  Patient will be contacted to schedule.  - Adult Dermatology  Referral; Future    Genital herpes simplex, unspecified site  Chronic, intermittent.  Does have flares some more frequently due to stress levels.  Has a significant amount of stress level at this time.  We will be also starting Humira shortly, which may likely increase episodes.  We will change to daily suppressive therapy.  New prescription sent to the pharmacy.  -  valACYclovir (VALTREX) 500 MG tablet; Take 1 tablet (500 mg) by mouth daily

## 2023-10-24 NOTE — PROGRESS NOTES
Robson is a 53 year old who is being evaluated via a billable video visit.      How would you like to obtain your AVS? MyChart  If the video visit is dropped, the invitation should be resent by: Text to cell phone: [phone]  Will anyone else be joining your video visit? No          Assessment & Plan     LLQ abdominal pain  Diarrhea, unspecified type  Dark red stool  Patient reports significant left lower quadrant abdominal pain over the weekend accompanied by several bouts of diarrhea with dark red stool.  Denies any nausea, had 1 episode of vomiting on Sunday, none since.  Denies any fever, however reports some hot flashes.  Denies any history of acid reflux symptoms, possible ulcer in the past.  Denies any constipation prior to onset of diarrhea.  Left lower quadrant tenderness when patient palpates, is slightly better than the weekend.  Had previous colonoscopy in 2018 which was normal, has upcoming colonoscopy in December due to family history of colon cancer.  Given symptoms, recommend stat abdominal CT today.  Patient will be scheduled and notified of time.  Advised to stick to a bland even possibly a clear liquid diet for now.  We will call with results and further recommendations.  If negative, will likely have patient obtain stool samples.  - CT Abdomen w/o Contrast; Future    Seropositive rheumatoid arthritis (H)  Chronic, recently established with rheumatology.  Will be starting Humira.  We will need to see dermatology for full skin check due to multiple lesions and family history of skin cancer and side effects of Humira.  - Adult Dermatology  Referral; Future    Skin lesion  Patient has a history of multiple skin lesions, skin takes.  We will be starting Humira per rheumatology.  Needs full body check and monitoring while on treatment.  Referral placed.  Patient will be contacted to schedule.  - Adult Dermatology  Referral; Future    Genital herpes simplex, unspecified site  Chronic,  "intermittent.  Does have flares some more frequently due to stress levels.  Has a significant amount of stress level at this time.  We will be also starting Humira shortly, which may likely increase episodes.  We will change to daily suppressive therapy.  New prescription sent to the pharmacy.  - valACYclovir (VALTREX) 500 MG tablet; Take 1 tablet (500 mg) by mouth daily             BMI:   Estimated body mass index is 34.44 kg/m  as calculated from the following:    Height as of 10/17/23: 1.702 m (5' 7\").    Weight as of 10/17/23: 99.7 kg (219 lb 14.4 oz).   Weight management plan: Patient was referred to their PCP to discuss a diet and exercise plan.    See Patient Instructions    Nupur Curiel DNP, APRN-CNP   [facility]    Chris Chance is a 53 year old, presenting for the following health issues:  Recheck Medication        10/24/2023     9:32 AM   Additional Questions   Roomed by Danae CLINTON CMA       History of Present Illness       Reason for visit:  I am starting Humira and i need my valtex to be a daily pill also i need a referral to the dermatologist due to skin issues    He eats 0-1 servings of fruits and vegetables daily.He consumes 0 sweetened beverage(s) daily.He exercises with enough effort to increase his heart rate 10 to 19 minutes per day.  He exercises with enough effort to increase his heart rate 3 or less days per week.   He is taking medications regularly.         Saw Rheumatology ~ starting Humira for rheumatoid arthritis      Severe tenderness of left lower quadrant with significant diarrhea all weekend ~ no kael blood, but darker redder (chili color)  Still having plenty of bowel movement's ~ has always had irritable bowels.  Usually goes 2-3 times/day normally, going more often now  No history of acid reflux   No nausea or fevers; feeling hot flashes   Did vomit once on Sunday  A little better, feels like a pulled muscle, but not ~ slightly above the area of " the hernia ~goes up entire left side of abdomen    Had a colonoscopy in 2018 was normal   Has another scheduled in December     Has multiple skin lesions, with starting Humira and family history of skin cancer needs to see dermatology for skin check again.  Has seen dermatology in the past through [facility] approximately 5 years ago and patient reports everything was looking okay at that time.      Review of Systems   Constitutional, HEENT, cardiovascular, pulmonary, gi and gu systems are negative, except as otherwise noted.      Objective           Vitals:  No vitals were obtained today due to virtual visit.    Physical Exam   GENERAL: Healthy, alert and no distress  EYES: Eyes grossly normal to inspection.  No discharge or erythema, or obvious scleral/conjunctival abnormalities.  RESP: No audible wheeze, cough, or visible cyanosis.  No visible retractions or increased work of breathing.    ABDOMEN: Tender when patient palpates left lower quadrant and left upper quadrant  SKIN: Visible skin clear. No significant rash, abnormal pigmentation or lesions.  NEURO: Cranial nerves grossly intact.  Mentation and speech appropriate for age.  PSYCH: Mentation appears normal, affect normal/bright, judgement and insight intact, normal speech and appearance well-groomed.    Diagnostic Test Results:  Labs reviewed in Epic  Abdominal CT ~pending            Video-Visit Details    Type of service:  Video Visit     Originating Location (pt. Location): Home    Distant Location (provider location):  Off-site  Platform used for Video Visit: KeyVive    Chart documentation with Dragon Voice recognition Software. Although reviewed after completion, some words and grammatical errors may remain.

## 2023-10-25 DIAGNOSIS — K57.32 DIVERTICULITIS OF COLON: Primary | ICD-10-CM

## 2023-10-25 DIAGNOSIS — K57.32 DIVERTICULITIS OF COLON: ICD-10-CM

## 2023-10-25 RX ORDER — METRONIDAZOLE 500 MG/1
500 TABLET ORAL 3 TIMES DAILY
Qty: 20 TABLET | Refills: 0 | Status: SHIPPED | OUTPATIENT
Start: 2023-10-25 | End: 2023-11-14

## 2023-10-25 RX ORDER — METRONIDAZOLE 500 MG/1
500 TABLET ORAL 2 TIMES DAILY
Qty: 20 TABLET | Refills: 0 | Status: SHIPPED | OUTPATIENT
Start: 2023-10-25 | End: 2023-10-25

## 2023-10-25 RX ORDER — CIPROFLOXACIN 500 MG/1
500 TABLET, FILM COATED ORAL 2 TIMES DAILY
Qty: 30 TABLET | Refills: 0 | Status: SHIPPED | OUTPATIENT
Start: 2023-10-25 | End: 2023-11-14

## 2023-10-25 RX ORDER — CIPROFLOXACIN 500 MG/1
500 TABLET, FILM COATED ORAL 3 TIMES DAILY
Qty: 30 TABLET | Refills: 0 | Status: SHIPPED | OUTPATIENT
Start: 2023-10-25 | End: 2023-10-25

## 2023-10-27 ENCOUNTER — TELEPHONE (OUTPATIENT)
Dept: RHEUMATOLOGY | Facility: CLINIC | Age: 53
End: 2023-10-27
Payer: COMMERCIAL

## 2023-10-27 NOTE — TELEPHONE ENCOUNTER
Patient's wife called and left voicemail on 10/26/23 asking for guidance on when to start Humira with Robson being diagnosed with diverticulitis and on antibiotics.    I called patient's wife back today and was put on speaker with both patient and wife.  Patient has not started Humira yet and product is still in the refrigerator.  I recommended patient hold off on starting Humira until they finish their antibiotics and feel better.  Patient and wife agreed with plan.    Robson Stevens, PharmD  Medication Therapy Management Pharmacist  [facility]  Phone: [phone]

## 2023-11-14 ENCOUNTER — HOSPITAL ENCOUNTER (EMERGENCY)
Facility: CLINIC | Age: 53
Discharge: HOME | End: 2023-11-14
Attending: EMERGENCY MEDICINE | Admitting: EMERGENCY MEDICINE
Payer: COMMERCIAL

## 2023-11-14 ENCOUNTER — HOSPITAL ENCOUNTER (OUTPATIENT)
Dept: CT IMAGING | Facility: CLINIC | Age: 53
Discharge: HOME | End: 2023-11-14
Attending: NURSE PRACTITIONER | Admitting: NURSE PRACTITIONER
Payer: COMMERCIAL

## 2023-11-14 ENCOUNTER — VIRTUAL VISIT (OUTPATIENT)
Dept: FAMILY MEDICINE | Facility: CLINIC | Age: 53
End: 2023-11-14
Payer: COMMERCIAL

## 2023-11-14 VITALS
SYSTOLIC BLOOD PRESSURE: 117 MMHG | WEIGHT: 215 LBS | TEMPERATURE: 97.8 F | DIASTOLIC BLOOD PRESSURE: 81 MMHG | BODY MASS INDEX: 33.74 KG/M2 | HEIGHT: 67 IN | OXYGEN SATURATION: 94 % | RESPIRATION RATE: 18 BRPM | HEART RATE: 54 BPM

## 2023-11-14 DIAGNOSIS — Z87.19 HISTORY OF DIVERTICULITIS: ICD-10-CM

## 2023-11-14 DIAGNOSIS — K92.1 BLACK STOOLS: ICD-10-CM

## 2023-11-14 DIAGNOSIS — R10.84 ABDOMINAL PAIN, GENERALIZED: ICD-10-CM

## 2023-11-14 DIAGNOSIS — K57.92 DIVERTICULITIS: ICD-10-CM

## 2023-11-14 DIAGNOSIS — R10.32 LLQ ABDOMINAL PAIN: ICD-10-CM

## 2023-11-14 DIAGNOSIS — R10.32 LLQ ABDOMINAL PAIN: Primary | ICD-10-CM

## 2023-11-14 LAB
ALBUMIN SERPL BCG-MCNC: 4.5 G/DL (ref 3.5–5.2)
ALP SERPL-CCNC: 65 U/L (ref 40–150)
ALT SERPL W P-5'-P-CCNC: 26 U/L (ref 0–70)
ANION GAP SERPL CALCULATED.3IONS-SCNC: 10 MMOL/L (ref 7–15)
AST SERPL W P-5'-P-CCNC: 22 U/L (ref 0–45)
BASOPHILS # BLD AUTO: 0 10E3/UL (ref 0–0.2)
BASOPHILS NFR BLD AUTO: 1 %
BILIRUB SERPL-MCNC: 0.6 MG/DL
BUN SERPL-MCNC: 12.9 MG/DL (ref 6–20)
CALCIUM SERPL-MCNC: 8.7 MG/DL (ref 8.6–10)
CHLORIDE SERPL-SCNC: 108 MMOL/L (ref 98–107)
CREAT SERPL-MCNC: 0.89 MG/DL (ref 0.67–1.17)
CRP SERPL-MCNC: 13.4 MG/L
DEPRECATED HCO3 PLAS-SCNC: 25 MMOL/L (ref 22–29)
EGFRCR SERPLBLD CKD-EPI 2021: >90 ML/MIN/1.73M2
EOSINOPHIL # BLD AUTO: 0.2 10E3/UL (ref 0–0.7)
EOSINOPHIL NFR BLD AUTO: 4 %
ERYTHROCYTE [DISTWIDTH] IN BLOOD BY AUTOMATED COUNT: 12.4 % (ref 10–15)
ERYTHROCYTE [SEDIMENTATION RATE] IN BLOOD BY WESTERGREN METHOD: 62 MM/HR (ref 0–20)
GLUCOSE SERPL-MCNC: 83 MG/DL (ref 70–99)
HCT VFR BLD AUTO: 39.7 % (ref 40–53)
HGB BLD-MCNC: 14.1 G/DL (ref 13.3–17.7)
IMM GRANULOCYTES # BLD: 0 10E3/UL
IMM GRANULOCYTES NFR BLD: 0 %
LYMPHOCYTES # BLD AUTO: 1.7 10E3/UL (ref 0.8–5.3)
LYMPHOCYTES NFR BLD AUTO: 29 %
MCH RBC QN AUTO: 33 PG (ref 26.5–33)
MCHC RBC AUTO-ENTMCNC: 35.5 G/DL (ref 31.5–36.5)
MCV RBC AUTO: 93 FL (ref 78–100)
MONOCYTES # BLD AUTO: 0.6 10E3/UL (ref 0–1.3)
MONOCYTES NFR BLD AUTO: 10 %
NEUTROPHILS # BLD AUTO: 3.3 10E3/UL (ref 1.6–8.3)
NEUTROPHILS NFR BLD AUTO: 56 %
NRBC # BLD AUTO: 0 10E3/UL
NRBC BLD AUTO-RTO: 0 /100
PLATELET # BLD AUTO: 160 10E3/UL (ref 150–450)
POTASSIUM SERPL-SCNC: 3.9 MMOL/L (ref 3.4–5.3)
PROT SERPL-MCNC: 7 G/DL (ref 6.4–8.3)
RBC # BLD AUTO: 4.27 10E6/UL (ref 4.4–5.9)
SODIUM SERPL-SCNC: 143 MMOL/L (ref 135–145)
WBC # BLD AUTO: 5.8 10E3/UL (ref 4–11)

## 2023-11-14 PROCEDURE — 85652 RBC SED RATE AUTOMATED: CPT | Performed by: EMERGENCY MEDICINE

## 2023-11-14 PROCEDURE — 99214 OFFICE O/P EST MOD 30 MIN: CPT | Mod: VID | Performed by: NURSE PRACTITIONER

## 2023-11-14 PROCEDURE — 85025 COMPLETE CBC W/AUTO DIFF WBC: CPT | Performed by: EMERGENCY MEDICINE

## 2023-11-14 PROCEDURE — 250N000013 HC RX MED GY IP 250 OP 250 PS 637: Performed by: EMERGENCY MEDICINE

## 2023-11-14 PROCEDURE — 80053 COMPREHEN METABOLIC PANEL: CPT | Performed by: FAMILY MEDICINE

## 2023-11-14 PROCEDURE — 99283 EMERGENCY DEPT VISIT LOW MDM: CPT | Mod: 25

## 2023-11-14 PROCEDURE — 85025 COMPLETE CBC W/AUTO DIFF WBC: CPT | Performed by: FAMILY MEDICINE

## 2023-11-14 PROCEDURE — 99284 EMERGENCY DEPT VISIT MOD MDM: CPT | Performed by: EMERGENCY MEDICINE

## 2023-11-14 PROCEDURE — 74177 CT ABD & PELVIS W/CONTRAST: CPT

## 2023-11-14 PROCEDURE — 80053 COMPREHEN METABOLIC PANEL: CPT | Performed by: EMERGENCY MEDICINE

## 2023-11-14 PROCEDURE — 86140 C-REACTIVE PROTEIN: CPT | Performed by: EMERGENCY MEDICINE

## 2023-11-14 PROCEDURE — 250N000009 HC RX 250: Performed by: NURSE PRACTITIONER

## 2023-11-14 PROCEDURE — 250N000011 HC RX IP 250 OP 636: Performed by: NURSE PRACTITIONER

## 2023-11-14 PROCEDURE — 36415 COLL VENOUS BLD VENIPUNCTURE: CPT | Performed by: FAMILY MEDICINE

## 2023-11-14 RX ORDER — IOPAMIDOL 755 MG/ML
100 INJECTION, SOLUTION INTRAVASCULAR ONCE
Status: COMPLETED | OUTPATIENT
Start: 2023-11-14 | End: 2023-11-14

## 2023-11-14 RX ADMIN — SODIUM CHLORIDE 67 ML: 9 INJECTION, SOLUTION INTRAVENOUS at 15:18

## 2023-11-14 RX ADMIN — IOPAMIDOL 100 ML: 755 INJECTION, SOLUTION INTRAVENOUS at 15:18

## 2023-11-14 RX ADMIN — AMOXICILLIN AND CLAVULANATE POTASSIUM 1 TABLET: 875; 125 TABLET, COATED ORAL at 20:28

## 2023-11-14 ASSESSMENT — ACTIVITIES OF DAILY LIVING (ADL): ADLS_ACUITY_SCORE: 35

## 2023-11-14 NOTE — PROGRESS NOTES
Robson is a 53 year old who is being evaluated via a billable video visit.      How would you like to obtain your AVS? MyChart  If the video visit is dropped, the invitation should be resent by: Text to cell phone: [phone]  Will anyone else be joining your video visit? No          Assessment & Plan     LLQ abdominal pain  Abdominal pain, generalized  History of diverticulitis  Black stools  Patient has recurrent left lower quadrant abdominal pain now migrating slightly to center of abdomen.  Previously treated with antibiotics for diverticulitis based off CT scan, with mild symptom improvement.  Appears to be nontoxic.  Reports low-grade fevers the last couple of days.  Denies any nausea or vomiting.  Does have colonoscopy screening coming up in December with significant family history of colon cancer.  Discussed with patient ER for inpatient admission or repeat CT scan to further evaluate and recheck on diverticulitis.  Also given symptoms and black stools approximately 1 week ago, would recommend patient have diagnostic colonoscopy versus screening, new referral has been placed.  Patient will be scheduled for CT scan today, advised patient to wait at hospital for results as he may need to go to the ER.  Recommends being on a clear liquid diet at this time.  Patient verbalized understanding.  - CT Abdomen Pelvis w Contrast; Future  - Adult GI  Referral - Procedure Only; Future           See Patient Instructions    Nupur Curiel DNP, APRN-CNP   [facility]    Subjective   Robson is a 53 year old, presenting for the following health issues:  No chief complaint on file.        11/14/2023    11:18 AM   Additional Questions   Roomed by luz marina coronado cma   Accompanied by wife       History of Present Illness       Reason for visit:  Stomach pain    He eats 0-1 servings of fruits and vegetables daily.He consumes 0 sweetened beverage(s) daily.He exercises with enough effort to increase  his heart rate 10 to 19 minutes per day.  He exercises with enough effort to increase his heart rate 3 or less days per week.   He is taking medications regularly.       Pt has had stomach pain for 3-4 weeks, went away for a week but now is back. He had  a scope done and he has diverticulitis.   Symptoms did get a little bit better on the antibiotics   Finally got over the diarrhea a couple of days ago      Review of Systems   Constitutional, HEENT, cardiovascular, pulmonary, gi and gu systems are negative, except as otherwise noted.      Objective           Vitals:  No vitals were obtained today due to virtual visit.    Physical Exam   GENERAL: Healthy, alert and no distress  EYES: Eyes grossly normal to inspection.  No discharge or erythema, or obvious scleral/conjunctival abnormalities.  RESP: No audible wheeze, cough, or visible cyanosis.  No visible retractions or increased work of breathing.    SKIN: Visible skin clear. No significant rash, abnormal pigmentation or lesions.  NEURO: Cranial nerves grossly intact.  Mentation and speech appropriate for age.  PSYCH: Mentation appears normal, affect normal/bright, judgement and insight intact, normal speech and appearance well-groomed.    Diagnostic Test Results:  Labs reviewed in Epic  CT pending            Video-Visit Details    Type of service:  Video Visit     Originating Location (pt. Location): Home    Distant Location (provider location):  Off-site  Platform used for Video Visit: [employer]    Chart documentation with Dragon Voice recognition Software. Although reviewed after completion, some words and grammatical errors may remain.

## 2023-11-14 NOTE — ED TRIAGE NOTES
Pt sent from [facility]. Had a CT for ongoing abdominal pain which showed diverticulitis. Pt has completed antibiotics for this already and his primary NP told him that she typically does not need to re-treat pt's for this and wanted him seen in the ER.      Triage Assessment (Adult)       Row Name 11/14/23 1608          Triage Assessment    Airway WDL WDL        Respiratory WDL    Respiratory WDL WDL        Skin Circulation/Temperature WDL    Skin Circulation/Temperature WDL WDL        Cardiac WDL    Cardiac WDL WDL        Peripheral/Neurovascular WDL    Peripheral Neurovascular WDL WDL        Cognitive/Neuro/Behavioral WDL    Cognitive/Neuro/Behavioral WDL WDL

## 2023-11-15 NOTE — DISCHARGE INSTRUCTIONS
Follow-up with your primary clinic next week for reevaluation.    Augmentin as directed.    If you continue to have diarrhea, return stool specimen to the lab for further testing.    Return to the emergency department for high fevers, worsening symptoms, or any other problems.

## 2023-11-17 NOTE — ED PROVIDER NOTES
History     Chief Complaint   Patient presents with    Abdominal Pain     HPI  Robson Galeano is a 53 year old male who was referred to the emergency department after outpatient CT scan demonstrated findings of improved, but continued diverticulitis.  Patient had recently been treated with course of ciprofloxacin and Flagyl for uncomplicated diverticulitis.  He reports continued pain but denies high fevers.  He has had no nausea or vomiting.  He denies any recent blood in his stool, but does report that he has been having some ongoing diarrhea and about a week and a half ago did have some black appearing stools.  He had been taking Pepto-Bismol around this time, but states that he only took it once and that was several days before.  He has not had black or tarry stools since.    Allergies:  Allergies   Allergen Reactions    Bee Venom Other (See Comments)     Hallucinates, no anaphylaxis  Hallucinates, no anaphylaxis  Hallucinates, no anaphylaxis         Problem List:    Patient Active Problem List    Diagnosis Date Noted    Genital herpes simplex, unspecified site 10/24/2023     Priority: Medium    Chronic bilateral low back pain without sciatica 03/02/2023     Priority: Medium    Benign prostatic hyperplasia, unspecified whether lower urinary tract symptoms present 03/02/2023     Priority: Medium    Hypogonadism male 07/30/2021     Priority: Medium    Non-recurrent unilateral inguinal hernia without obstruction or gangrene 01/26/2021     Priority: Medium    Seropositive rheumatoid arthritis (H) 12/28/2019     Priority: Medium    Ocular migraine 08/31/2012     Priority: Medium     Formatting of this note might be different from the original.  Rare-last 10-11      Morbid obesity (H) 08/31/2012     Priority: Medium     Formatting of this note might be different from the original.          Past Medical History:    No past medical history on file.    Past Surgical History:    No past surgical history on  "file.    Family History:    No family history on file.    Social History:  Marital Status:   [2]  Social History     Tobacco Use    Smoking status: Former     Packs/day: .5     Types: Cigarettes     Quit date: 2002     Years since quittin.8    Smokeless tobacco: Never    Tobacco comments:     Has medical marijuana card   Vaping Use    Vaping Use: Never used        Medications:    amoxicillin-clavulanate (AUGMENTIN) 875-125 MG tablet  adalimumab (HUMIRA *CF*) 40 MG/0.4ML pen kit  amitriptyline (ELAVIL) 10 MG tablet  folic acid (FOLVITE) 1 MG tablet  gabapentin (NEURONTIN) 300 MG capsule  methotrexate 2.5 MG tablet  Needle, Disp, (B-D HYPODERMIC NEEDLE) 18G X 1-1/2\" MISC  Needle, Disp, (B-D HYPODERMIC NEEDLE) 22G X 1\" MISC  Probiotic Product (FORTIFY DAILY PROBIOTIC PO)  Syringe Luer Slip (B-D SYRINGE LUER-MARIALUISA) 3 ML MISC  tamsulosin (FLOMAX) 0.4 MG capsule  testosterone cypionate (DEPOTESTOSTERONE) 200 MG/ML injection  triamcinolone (KENALOG) 0.1 % external ointment  valACYclovir (VALTREX) 1000 mg tablet  valACYclovir (VALTREX) 500 MG tablet          Review of Systems   All other systems reviewed and are negative.      Physical Exam   BP: 134/86  Pulse: 56  Temp: 97.8  F (36.6  C)  Resp: 18  Height: 170.2 cm (5' 7\")  Weight: 97.5 kg (215 lb)  SpO2: 98 %      Physical Exam  Vitals and nursing note reviewed.   Constitutional:       General: He is not in acute distress.     Appearance: He is well-developed. He is not ill-appearing, toxic-appearing or diaphoretic.   HENT:      Head: Normocephalic and atraumatic.      Mouth/Throat:      Lips: Pink.      Mouth: Mucous membranes are moist.      Pharynx: Oropharynx is clear. No oropharyngeal exudate.   Eyes:      General: Lids are normal. No scleral icterus.     Extraocular Movements: Extraocular movements intact.      Right eye: No nystagmus.      Left eye: No nystagmus.      Conjunctiva/sclera: Conjunctivae normal.      Pupils: Pupils are equal, round, and " reactive to light.   Neck:      Thyroid: No thyromegaly.      Vascular: No JVD.      Trachea: No tracheal deviation.   Cardiovascular:      Rate and Rhythm: Normal rate and regular rhythm.      Pulses: Normal pulses.      Heart sounds: Normal heart sounds. No murmur heard.     No friction rub. No gallop.   Pulmonary:      Effort: Pulmonary effort is normal. No respiratory distress.      Breath sounds: Normal breath sounds.   Abdominal:      General: Bowel sounds are normal. There is no distension.      Palpations: Abdomen is soft. There is no mass.      Tenderness: There is abdominal tenderness (Moderate tenderness to palpation without peritoneal signs.) in the left lower quadrant. There is no guarding or rebound.   Musculoskeletal:         General: No tenderness. Normal range of motion.      Cervical back: Normal range of motion and neck supple. No erythema or rigidity.      Right lower leg: No edema.      Left lower leg: No edema.   Lymphadenopathy:      Cervical: No cervical adenopathy.   Skin:     General: Skin is warm and dry.      Capillary Refill: Capillary refill takes less than 2 seconds.      Coloration: Skin is not pale.      Findings: No erythema or rash.   Neurological:      Mental Status: He is alert and oriented to person, place, and time.      Cranial Nerves: No cranial nerve deficit.      Sensory: No sensory deficit.      Motor: Motor function is intact.   Psychiatric:         Mood and Affect: Mood and affect normal.         Speech: Speech normal.         Behavior: Behavior normal.         ED Course                 Procedures         Results for orders placed or performed during the hospital encounter of 11/14/23   Comprehensive metabolic panel     Status: Abnormal   Result Value Ref Range    Sodium 143 135 - 145 mmol/L    Potassium 3.9 3.4 - 5.3 mmol/L    Carbon Dioxide (CO2) 25 22 - 29 mmol/L    Anion Gap 10 7 - 15 mmol/L    Urea Nitrogen 12.9 6.0 - 20.0 mg/dL    Creatinine 0.89 0.67 - 1.17 mg/dL     GFR Estimate >90 >60 mL/min/1.73m2    Calcium 8.7 8.6 - 10.0 mg/dL    Chloride 108 (H) 98 - 107 mmol/L    Glucose 83 70 - 99 mg/dL    Alkaline Phosphatase 65 40 - 150 U/L    AST 22 0 - 45 U/L    ALT 26 0 - 70 U/L    Protein Total 7.0 6.4 - 8.3 g/dL    Albumin 4.5 3.5 - 5.2 g/dL    Bilirubin Total 0.6 <=1.2 mg/dL   CBC with platelets and differential     Status: Abnormal   Result Value Ref Range    WBC Count 5.8 4.0 - 11.0 10e3/uL    RBC Count 4.27 (L) 4.40 - 5.90 10e6/uL    Hemoglobin 14.1 13.3 - 17.7 g/dL    Hematocrit 39.7 (L) 40.0 - 53.0 %    MCV 93 78 - 100 fL    MCH 33.0 26.5 - 33.0 pg    MCHC 35.5 31.5 - 36.5 g/dL    RDW 12.4 10.0 - 15.0 %    Platelet Count 160 150 - 450 10e3/uL    % Neutrophils 56 %    % Lymphocytes 29 %    % Monocytes 10 %    % Eosinophils 4 %    % Basophils 1 %    % Immature Granulocytes 0 %    NRBCs per 100 WBC 0 <1 /100    Absolute Neutrophils 3.3 1.6 - 8.3 10e3/uL    Absolute Lymphocytes 1.7 0.8 - 5.3 10e3/uL    Absolute Monocytes 0.6 0.0 - 1.3 10e3/uL    Absolute Eosinophils 0.2 0.0 - 0.7 10e3/uL    Absolute Basophils 0.0 0.0 - 0.2 10e3/uL    Absolute Immature Granulocytes 0.0 <=0.4 10e3/uL    Absolute NRBCs 0.0 10e3/uL   CRP inflammation     Status: Abnormal   Result Value Ref Range    CRP Inflammation 13.40 (H) <5.00 mg/L   Erythrocyte sedimentation rate auto     Status: Abnormal   Result Value Ref Range    Erythrocyte Sedimentation Rate 62 (H) 0 - 20 mm/hr   CBC with platelets, differential     Status: Abnormal    Narrative    The following orders were created for panel order CBC with platelets, differential.  Procedure                               Abnormality         Status                     ---------                               -----------         ------                     CBC with platelets and d...[[id number]]  Abnormal            Final result                 Please view results for these tests on the individual orders.   Results for orders placed or performed during the  hospital encounter of 11/14/23   CT Abdomen Pelvis w Contrast     Status: None    Narrative    CT ABDOMEN AND PELVIS WITH CONTRAST 11/14/2023 3:25 PM    CLINICAL HISTORY: LLQ abdominal pain; Abdominal pain, generalized;  History of diverticulitis; Black stools.    TECHNIQUE: CT scan of the abdomen and pelvis was performed following  injection of IV contrast. Multiplanar reformats were obtained. Dose  reduction techniques were used.    CONTRAST: 100 Isovue-370    COMPARISON: 10/24/2023    FINDINGS:   LOWER CHEST: Normal.    HEPATOBILIARY: Diffuse fatty infiltration is noted within the liver  with some focal sparing along the scot hepatis. A subcentimeter  low-attenuation lesion is seen in the anterior right hepatic lobe that  is too small to definitely characterize, but statistically likely  represents a cyst or hemangioma. Portal vein is patent.    PANCREAS: Normal.    SPLEEN: Normal.    ADRENAL GLANDS: Normal.    KIDNEYS/BLADDER: Cysts are seen in both kidneys. No follow-up  necessary for these. The bladder shows no wall thickening or mass.    BOWEL: No bowel obstruction. Normal appendix. Wall thickening with  stranding in adjacent inflammation is moderately improved in the  distal descending/proximal sigmoid colon consistent with acute  diverticulitis. No perforation or abscess.    PELVIC ORGANS: The prostate is moderately enlarged. No free fluid or  adenopathy in the pelvis. There is a 21 x 18 mm low-attenuation lesion  in the left pelvis that is contiguous with a lower sacral neural  foramina. This likely represents a nerve sheath tumor or cyst. It  measures 23 Hounsfield units which is borderline for solid versus  cystic lesion. Small fat-containing left inguinal hernia.    ADDITIONAL FINDINGS: None.    MUSCULOSKELETAL: Degenerative changes are noted through the spine.      Impression    IMPRESSION:   1.  Interval improvement, but not resolution of diverticulitis of the  distal descending/proximal sigmoid  colon. No perforation or abscess.  2.  21 mm lesion in the left posterior pelvis either represents a  dilated nerve sheath or tumor such as schwannoma.    MIGUEL ALVARADO MD         SYSTEM ID:  [id number]        Medications   amoxicillin-clavulanate (AUGMENTIN) 875-125 MG per tablet 1 tablet (1 tablet Oral $Given 11/14/23 2028)       Assessments & Plan (with Medical Decision Making)     I have reviewed the nursing notes.    I have reviewed the findings, diagnosis, plan and need for follow up with the patient.  This patient presented to the emergency department with continued pain from diverticulitis.  I did review the CT scan that was done earlier today which does demonstrate no evidence of perforation or abscess and demonstrated some mild improvement but continued signs of diverticulitis.  I did try to obtain stool sample for stool studies to make sure there was no con commitment infection, but patient was unable to produce a stool specimen in the emergency room.  No significant leukocytosis is noted, but patient does have elevation of inflammatory markers, sed rate and CRP, so I will retreat for diverticulitis using Augmentin.  Patient was given a dose in the emergency room and was discharged with prescription for Augmentin.  I did review old records and it seems that patient is being referred to follow-up with a gastroenterologist.  He was provided with stool collection kit and was told to return a stool sample if he is able to obtain this at home if he continues to have diarrhea.  He was discharged with instructions for care and follow-up in good condition.        Discharge Medication List as of 11/14/2023  8:25 PM        START taking these medications    Details   amoxicillin-clavulanate (AUGMENTIN) 875-125 MG tablet Take 1 tablet by mouth 2 times daily for 14 days, Disp-28 tablet, R-0, E-Prescribe             Final diagnoses:   Diverticulitis       11/14/2023   [facility] EMERGENCY DEPT        Jonathan iKrby MD  11/17/23 2034

## 2023-11-21 ENCOUNTER — LAB (OUTPATIENT)
Dept: LAB | Facility: CLINIC | Age: 53
End: 2023-11-21
Payer: COMMERCIAL

## 2023-11-21 DIAGNOSIS — Z79.899 HIGH RISK MEDICATION USE: ICD-10-CM

## 2023-11-21 DIAGNOSIS — M05.9 SEROPOSITIVE RHEUMATOID ARTHRITIS (H): ICD-10-CM

## 2023-11-21 LAB
ALBUMIN SERPL BCG-MCNC: 4.6 G/DL (ref 3.5–5.2)
ALT SERPL W P-5'-P-CCNC: 42 U/L (ref 0–70)
AST SERPL W P-5'-P-CCNC: 26 U/L (ref 0–45)
CREAT SERPL-MCNC: 0.96 MG/DL (ref 0.67–1.17)
CRP SERPL-MCNC: <3 MG/L
EGFRCR SERPLBLD CKD-EPI 2021: >90 ML/MIN/1.73M2
ERYTHROCYTE [DISTWIDTH] IN BLOOD BY AUTOMATED COUNT: 12.1 % (ref 10–15)
ERYTHROCYTE [SEDIMENTATION RATE] IN BLOOD BY WESTERGREN METHOD: 43 MM/HR (ref 0–20)
HCT VFR BLD AUTO: 41.4 % (ref 40–53)
HGB BLD-MCNC: 14.3 G/DL (ref 13.3–17.7)
MCH RBC QN AUTO: 32 PG (ref 26.5–33)
MCHC RBC AUTO-ENTMCNC: 34.5 G/DL (ref 31.5–36.5)
MCV RBC AUTO: 93 FL (ref 78–100)
PLATELET # BLD AUTO: 149 10E3/UL (ref 150–450)
RBC # BLD AUTO: 4.47 10E6/UL (ref 4.4–5.9)
WBC # BLD AUTO: 5.1 10E3/UL (ref 4–11)

## 2023-11-21 PROCEDURE — 85652 RBC SED RATE AUTOMATED: CPT

## 2023-11-21 PROCEDURE — 84460 ALANINE AMINO (ALT) (SGPT): CPT

## 2023-11-21 PROCEDURE — 82040 ASSAY OF SERUM ALBUMIN: CPT

## 2023-11-21 PROCEDURE — 86140 C-REACTIVE PROTEIN: CPT

## 2023-11-21 PROCEDURE — 85027 COMPLETE CBC AUTOMATED: CPT

## 2023-11-21 PROCEDURE — 84450 TRANSFERASE (AST) (SGOT): CPT

## 2023-11-21 PROCEDURE — 82565 ASSAY OF CREATININE: CPT

## 2023-11-21 PROCEDURE — 36415 COLL VENOUS BLD VENIPUNCTURE: CPT

## 2023-12-11 DIAGNOSIS — Z79.899 HIGH RISK MEDICATION USE: ICD-10-CM

## 2023-12-11 DIAGNOSIS — M05.9 SEROPOSITIVE RHEUMATOID ARTHRITIS (H): ICD-10-CM

## 2023-12-11 RX ORDER — METHOTREXATE 2.5 MG/1
20 TABLET ORAL
Qty: 96 TABLET | Refills: 0 | Status: SHIPPED | OUTPATIENT
Start: 2023-12-11 | End: 2024-02-08

## 2024-01-08 ENCOUNTER — APPOINTMENT (OUTPATIENT)
Dept: CT IMAGING | Facility: CLINIC | Age: 54
End: 2024-01-08
Attending: STUDENT IN AN ORGANIZED HEALTH CARE EDUCATION/TRAINING PROGRAM
Payer: COMMERCIAL

## 2024-01-08 ENCOUNTER — HOSPITAL ENCOUNTER (EMERGENCY)
Facility: CLINIC | Age: 54
Discharge: HOME | End: 2024-01-08
Attending: STUDENT IN AN ORGANIZED HEALTH CARE EDUCATION/TRAINING PROGRAM | Admitting: STUDENT IN AN ORGANIZED HEALTH CARE EDUCATION/TRAINING PROGRAM
Payer: COMMERCIAL

## 2024-01-08 VITALS
OXYGEN SATURATION: 95 % | TEMPERATURE: 97.2 F | BODY MASS INDEX: 34.55 KG/M2 | DIASTOLIC BLOOD PRESSURE: 92 MMHG | WEIGHT: 215 LBS | SYSTOLIC BLOOD PRESSURE: 129 MMHG | HEART RATE: 81 BPM | HEIGHT: 66 IN | RESPIRATION RATE: 18 BRPM

## 2024-01-08 DIAGNOSIS — K57.32 DIVERTICULITIS OF LARGE INTESTINE WITHOUT PERFORATION OR ABSCESS, UNSPECIFIED BLEEDING STATUS: ICD-10-CM

## 2024-01-08 PROBLEM — G47.33 OSA (OBSTRUCTIVE SLEEP APNEA): Status: ACTIVE | Noted: 2024-01-08

## 2024-01-08 PROBLEM — I10 ESSENTIAL HYPERTENSION: Status: ACTIVE | Noted: 2024-01-08

## 2024-01-08 PROBLEM — M13.0 POLYARTHRITIS: Status: ACTIVE | Noted: 2024-01-08

## 2024-01-08 PROBLEM — R79.89 LOW TESTOSTERONE IN MALE: Chronic | Status: ACTIVE | Noted: 2019-09-29

## 2024-01-08 PROBLEM — F32.A DEPRESSION: Status: ACTIVE | Noted: 2024-01-08

## 2024-01-08 PROBLEM — A60.01 TYPE 2 HSV INFECTION OF PENIS: Status: ACTIVE | Noted: 2024-01-08

## 2024-01-08 LAB
ALBUMIN SERPL BCG-MCNC: 4.6 G/DL (ref 3.5–5.2)
ALBUMIN UR-MCNC: NEGATIVE MG/DL
ALP SERPL-CCNC: 71 U/L (ref 40–150)
ALT SERPL W P-5'-P-CCNC: 39 U/L (ref 0–70)
ANION GAP SERPL CALCULATED.3IONS-SCNC: 11 MMOL/L (ref 7–15)
APPEARANCE UR: CLEAR
AST SERPL W P-5'-P-CCNC: 18 U/L (ref 0–45)
BASOPHILS # BLD AUTO: 0 10E3/UL (ref 0–0.2)
BASOPHILS NFR BLD AUTO: 0 %
BILIRUB SERPL-MCNC: 0.8 MG/DL
BILIRUB UR QL STRIP: NEGATIVE
BUN SERPL-MCNC: 13.5 MG/DL (ref 6–20)
CALCIUM SERPL-MCNC: 9.2 MG/DL (ref 8.6–10)
CHLORIDE SERPL-SCNC: 109 MMOL/L (ref 98–107)
COLOR UR AUTO: YELLOW
CREAT SERPL-MCNC: 0.94 MG/DL (ref 0.67–1.17)
DEPRECATED HCO3 PLAS-SCNC: 24 MMOL/L (ref 22–29)
EGFRCR SERPLBLD CKD-EPI 2021: >90 ML/MIN/1.73M2
EOSINOPHIL # BLD AUTO: 0.1 10E3/UL (ref 0–0.7)
EOSINOPHIL NFR BLD AUTO: 1 %
ERYTHROCYTE [DISTWIDTH] IN BLOOD BY AUTOMATED COUNT: 14.2 % (ref 10–15)
GLUCOSE SERPL-MCNC: 109 MG/DL (ref 70–99)
GLUCOSE UR STRIP-MCNC: NEGATIVE MG/DL
HCT VFR BLD AUTO: 41 % (ref 40–53)
HGB BLD-MCNC: 14.1 G/DL (ref 13.3–17.7)
HGB UR QL STRIP: NEGATIVE
IMM GRANULOCYTES # BLD: 0 10E3/UL
IMM GRANULOCYTES NFR BLD: 0 %
KETONES UR STRIP-MCNC: NEGATIVE MG/DL
LEUKOCYTE ESTERASE UR QL STRIP: NEGATIVE
LYMPHOCYTES # BLD AUTO: 1.2 10E3/UL (ref 0.8–5.3)
LYMPHOCYTES NFR BLD AUTO: 13 %
MCH RBC QN AUTO: 31.5 PG (ref 26.5–33)
MCHC RBC AUTO-ENTMCNC: 34.4 G/DL (ref 31.5–36.5)
MCV RBC AUTO: 92 FL (ref 78–100)
MONOCYTES # BLD AUTO: 0.8 10E3/UL (ref 0–1.3)
MONOCYTES NFR BLD AUTO: 9 %
MUCOUS THREADS #/AREA URNS LPF: PRESENT /LPF
NEUTROPHILS # BLD AUTO: 6.9 10E3/UL (ref 1.6–8.3)
NEUTROPHILS NFR BLD AUTO: 77 %
NITRATE UR QL: NEGATIVE
NRBC # BLD AUTO: 0 10E3/UL
NRBC BLD AUTO-RTO: 0 /100
PH UR STRIP: 5 [PH] (ref 5–7)
PLATELET # BLD AUTO: 170 10E3/UL (ref 150–450)
POTASSIUM SERPL-SCNC: 3.9 MMOL/L (ref 3.4–5.3)
PROT SERPL-MCNC: 7 G/DL (ref 6.4–8.3)
RBC # BLD AUTO: 4.47 10E6/UL (ref 4.4–5.9)
RBC URINE: 1 /HPF
SODIUM SERPL-SCNC: 144 MMOL/L (ref 135–145)
SP GR UR STRIP: 1.01 (ref 1–1.03)
UROBILINOGEN UR STRIP-MCNC: NORMAL MG/DL
WBC # BLD AUTO: 9 10E3/UL (ref 4–11)
WBC URINE: <1 /HPF

## 2024-01-08 PROCEDURE — 250N000009 HC RX 250: Performed by: STUDENT IN AN ORGANIZED HEALTH CARE EDUCATION/TRAINING PROGRAM

## 2024-01-08 PROCEDURE — 258N000003 HC RX IP 258 OP 636: Performed by: STUDENT IN AN ORGANIZED HEALTH CARE EDUCATION/TRAINING PROGRAM

## 2024-01-08 PROCEDURE — 81001 URINALYSIS AUTO W/SCOPE: CPT | Performed by: STUDENT IN AN ORGANIZED HEALTH CARE EDUCATION/TRAINING PROGRAM

## 2024-01-08 PROCEDURE — 80053 COMPREHEN METABOLIC PANEL: CPT | Performed by: STUDENT IN AN ORGANIZED HEALTH CARE EDUCATION/TRAINING PROGRAM

## 2024-01-08 PROCEDURE — 99284 EMERGENCY DEPT VISIT MOD MDM: CPT | Performed by: STUDENT IN AN ORGANIZED HEALTH CARE EDUCATION/TRAINING PROGRAM

## 2024-01-08 PROCEDURE — 96360 HYDRATION IV INFUSION INIT: CPT | Mod: 59 | Performed by: STUDENT IN AN ORGANIZED HEALTH CARE EDUCATION/TRAINING PROGRAM

## 2024-01-08 PROCEDURE — 36415 COLL VENOUS BLD VENIPUNCTURE: CPT | Performed by: STUDENT IN AN ORGANIZED HEALTH CARE EDUCATION/TRAINING PROGRAM

## 2024-01-08 PROCEDURE — 74177 CT ABD & PELVIS W/CONTRAST: CPT

## 2024-01-08 PROCEDURE — 99285 EMERGENCY DEPT VISIT HI MDM: CPT | Mod: 25 | Performed by: STUDENT IN AN ORGANIZED HEALTH CARE EDUCATION/TRAINING PROGRAM

## 2024-01-08 PROCEDURE — 85004 AUTOMATED DIFF WBC COUNT: CPT | Performed by: STUDENT IN AN ORGANIZED HEALTH CARE EDUCATION/TRAINING PROGRAM

## 2024-01-08 PROCEDURE — 250N000011 HC RX IP 250 OP 636: Performed by: STUDENT IN AN ORGANIZED HEALTH CARE EDUCATION/TRAINING PROGRAM

## 2024-01-08 PROCEDURE — 250N000013 HC RX MED GY IP 250 OP 250 PS 637: Performed by: STUDENT IN AN ORGANIZED HEALTH CARE EDUCATION/TRAINING PROGRAM

## 2024-01-08 RX ORDER — ACETAMINOPHEN 500 MG
1000 TABLET ORAL ONCE
Status: COMPLETED | OUTPATIENT
Start: 2024-01-08 | End: 2024-01-08

## 2024-01-08 RX ORDER — IOPAMIDOL 755 MG/ML
100 INJECTION, SOLUTION INTRAVASCULAR ONCE
Status: COMPLETED | OUTPATIENT
Start: 2024-01-08 | End: 2024-01-08

## 2024-01-08 RX ADMIN — SODIUM CHLORIDE 1000 ML: 9 INJECTION, SOLUTION INTRAVENOUS at 09:36

## 2024-01-08 RX ADMIN — SODIUM CHLORIDE 66 ML: 9 INJECTION, SOLUTION INTRAVENOUS at 09:46

## 2024-01-08 RX ADMIN — ACETAMINOPHEN 1000 MG: 500 TABLET, FILM COATED ORAL at 10:09

## 2024-01-08 RX ADMIN — IOPAMIDOL 100 ML: 755 INJECTION, SOLUTION INTRAVENOUS at 09:46

## 2024-01-08 ASSESSMENT — ACTIVITIES OF DAILY LIVING (ADL): ADLS_ACUITY_SCORE: 33

## 2024-01-08 NOTE — ED PROVIDER NOTES
History     Chief Complaint   Patient presents with    Abdominal Pain     HPI  Robson Galeano is a 53 year old male who presents to the department for evaluation of left lower quadrant abdominal pain.  Patient explains that yesterday he began to feel dull and achy left lower quadrant abdominal pain consistent with previous diagnosis of diverticulitis in November 2023.  He also says that after first diagnosis his symptoms do not readily resolved with oral antibiotic Augmentin, he was prescribed a second course of antibiotic and symptoms eventually resolved by early December.  Now 4 weeks later he has a recurrence of similar symptoms.  He denies fever, chills, chest pain, cough, shortness of breath, right-sided abdominal discomfort, constipation or diarrhea.  No genitourinary symptoms.  No difficulties with eating or drinking.  No recent symptomatic medications.  No relevant past surgical history.        Allergies:  Allergies   Allergen Reactions    Bee Venom Other (See Comments)     Hallucinates, no anaphylaxis  Hallucinates, no anaphylaxis  Hallucinates, no anaphylaxis         Problem List:    Patient Active Problem List    Diagnosis Date Noted    Depression 01/08/2024     Priority: Medium    Essential hypertension 01/08/2024     Priority: Medium    RACHELL (obstructive sleep apnea) 01/08/2024     Priority: Medium    Polyarthritis 01/08/2024     Priority: Medium    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     Priority: Medium    Genital herpes simplex, unspecified site 10/24/2023     Priority: Medium    Chronic bilateral low back pain without sciatica 03/02/2023     Priority: Medium    Benign prostatic hyperplasia, unspecified whether lower urinary tract symptoms present 03/02/2023     Priority: Medium    Hypogonadism male 07/30/2021     Priority: Medium    Non-recurrent unilateral inguinal hernia without obstruction or gangrene 01/26/2021     Priority: Medium    Seropositive rheumatoid arthritis (H) 12/28/2019      "Priority: Medium    Low testosterone in male 2019     Priority: Medium    Ocular migraine 2012     Priority: Medium     Formatting of this note might be different from the original.  Rare-last 10-11      Morbid obesity (H) 2012     Priority: Medium     Formatting of this note might be different from the original.          Past Medical History:    No past medical history on file.    Past Surgical History:    No past surgical history on file.    Family History:    No family history on file.    Social History:  Marital Status:   [2]  Social History     Tobacco Use    Smoking status: Former     Packs/day: .5     Types: Cigarettes     Quit date: 2002     Years since quittin.0    Smokeless tobacco: Never    Tobacco comments:     Has medical marijuana card   Vaping Use    Vaping Use: Never used        Medications:    amoxicillin-clavulanate (AUGMENTIN) 875-125 MG tablet  adalimumab (HUMIRA *CF*) 40 MG/0.4ML pen kit  amitriptyline (ELAVIL) 10 MG tablet  folic acid (FOLVITE) 1 MG tablet  gabapentin (NEURONTIN) 300 MG capsule  methotrexate 2.5 MG tablet  Needle, Disp, (B-D HYPODERMIC NEEDLE) 18G X 1-1/2\" MISC  Needle, Disp, (B-D HYPODERMIC NEEDLE) 22G X 1\" MISC  Probiotic Product (FORTIFY DAILY PROBIOTIC PO)  Syringe Luer Slip (B-D SYRINGE LUER-MARIALUISA) 3 ML MISC  tamsulosin (FLOMAX) 0.4 MG capsule  testosterone cypionate (DEPOTESTOSTERONE) 200 MG/ML injection  triamcinolone (KENALOG) 0.1 % external ointment  valACYclovir (VALTREX) 1000 mg tablet  valACYclovir (VALTREX) 500 MG tablet          Review of Systems  Constitutional:  Negative for fever or chills.  Cardiovascular:  Negative for chest discomfort.  Respiratory:  Negative for cough or shortness of breath.   Gastrointestinal: Positive for LLQ abdominal pain.  Negative for nausea, vomiting, constipation or diarrhea.  Denies blood with bowel movements.  Genitourinary:  Negative for dysuria or urgency.  Musculoskeletal: Negative for acute back " "or flank pain.    All others reviewed and are negative.      Physical Exam   BP: 132/84  Pulse: 81  Temp: 97.2  F (36.2  C)  Resp: 18  Height: 167.6 cm (5' 6\")  Weight: 97.5 kg (215 lb)  SpO2: 97 %      Physical Exam  Constitutional:  Well developed, well nourished.  Appears nontoxic and in no acute distress.  Resting comfortably on the gurney.  HENT:  Normocephalic and atraumatic.  Symmetric in appearance.  Eyes:  Conjunctivae are normal.  Cardiovascular:  No cyanosis.  RRR.  No audible murmurs noted.    Respiratory:  Effort normal without sign of respiratory distress.  No audible wheezing or stridor.  CTAB.   Gastrointestinal:  Soft nondistended abdomen.  LLQ tenderness with guarding.  No rigidity or rebound tenderness.  Negative Mehta's sign.  Negative McBurney's point.    Neurological:  Patient is alert.  Skin:  Skin is warm and dry.  Psychiatric:  Normal mood and affect.      ED Course                 Procedures             Critical Care time:  none               Results for orders placed or performed during the hospital encounter of 01/08/24 (from the past 24 hour(s))   CBC with platelets differential    Narrative    The following orders were created for panel order CBC with platelets differential.  Procedure                               Abnormality         Status                     ---------                               -----------         ------                     CBC with platelets and d...[[id number]]                      Final result                 Please view results for these tests on the individual orders.   Comprehensive metabolic panel   Result Value Ref Range    Sodium 144 135 - 145 mmol/L    Potassium 3.9 3.4 - 5.3 mmol/L    Carbon Dioxide (CO2) 24 22 - 29 mmol/L    Anion Gap 11 7 - 15 mmol/L    Urea Nitrogen 13.5 6.0 - 20.0 mg/dL    Creatinine 0.94 0.67 - 1.17 mg/dL    GFR Estimate >90 >60 mL/min/1.73m2    Calcium 9.2 8.6 - 10.0 mg/dL    Chloride 109 (H) 98 - 107 mmol/L    Glucose 109 (H) 70 " - 99 mg/dL    Alkaline Phosphatase 71 40 - 150 U/L    AST 18 0 - 45 U/L    ALT 39 0 - 70 U/L    Protein Total 7.0 6.4 - 8.3 g/dL    Albumin 4.6 3.5 - 5.2 g/dL    Bilirubin Total 0.8 <=1.2 mg/dL   CBC with platelets and differential   Result Value Ref Range    WBC Count 9.0 4.0 - 11.0 10e3/uL    RBC Count 4.47 4.40 - 5.90 10e6/uL    Hemoglobin 14.1 13.3 - 17.7 g/dL    Hematocrit 41.0 40.0 - 53.0 %    MCV 92 78 - 100 fL    MCH 31.5 26.5 - 33.0 pg    MCHC 34.4 31.5 - 36.5 g/dL    RDW 14.2 10.0 - 15.0 %    Platelet Count 170 150 - 450 10e3/uL    % Neutrophils 77 %    % Lymphocytes 13 %    % Monocytes 9 %    % Eosinophils 1 %    % Basophils 0 %    % Immature Granulocytes 0 %    NRBCs per 100 WBC 0 <1 /100    Absolute Neutrophils 6.9 1.6 - 8.3 10e3/uL    Absolute Lymphocytes 1.2 0.8 - 5.3 10e3/uL    Absolute Monocytes 0.8 0.0 - 1.3 10e3/uL    Absolute Eosinophils 0.1 0.0 - 0.7 10e3/uL    Absolute Basophils 0.0 0.0 - 0.2 10e3/uL    Absolute Immature Granulocytes 0.0 <=0.4 10e3/uL    Absolute NRBCs 0.0 10e3/uL   UA with Microscopic reflex to Culture    Specimen: Urine, Midstream   Result Value Ref Range    Color Urine Yellow Colorless, Straw, Light Yellow, Yellow    Appearance Urine Clear Clear    Glucose Urine Negative Negative mg/dL    Bilirubin Urine Negative Negative    Ketones Urine Negative Negative mg/dL    Specific Gravity Urine 1.009 1.003 - 1.035    Blood Urine Negative Negative    pH Urine 5.0 5.0 - 7.0    Protein Albumin Urine Negative Negative mg/dL    Urobilinogen Urine Normal Normal, 2.0 mg/dL    Nitrite Urine Negative Negative    Leukocyte Esterase Urine Negative Negative    Mucus Urine Present (A) None Seen /LPF    RBC Urine 1 <=2 /HPF    WBC Urine <1 <=5 /HPF    Narrative    Urine Culture not indicated   CT Abdomen Pelvis w Contrast    Narrative    CT ABDOMEN/PELVIS WITH CONTRAST January 8, 2024 9:57 AM    CLINICAL HISTORY: Left lower quadrant pain, two month history of  recurring  diverticulitis.    TECHNIQUE: CT scan of the abdomen and pelvis was performed following  injection of IV contrast. Multiplanar reformats were obtained. Dose  reduction techniques were used.  CONTRAST: 100 Isovue-370    COMPARISON: CT 11/14/2023.    FINDINGS:   LOWER CHEST: Bibasilar atelectasis.    HEPATOBILIARY: Hepatic steatosis. No acute abnormality. Gallbladder  unremarkable. Tiny hepatic cyst image 35.    PANCREAS: Normal.    SPLEEN: Normal.    ADRENAL GLANDS: Normal.    KIDNEYS/BLADDER: No significant mass, stones, or hydronephrosis. There  are simple or benign cysts. No follow up is needed.    BOWEL: Acute diverticulitis at the descending/sigmoid colon level at  the left lower quadrant again identified. This appears progressed  since 11/14/2023. There is no abscess or free air. No bowel  obstruction. Normal appendix.    PELVIC ORGANS: Trace pelvic fluid. Stable low-density presumed cystic  lesion at the left posterior pelvis measuring 21 x 17 mm series 5  image 164. This approximates the course of the left third sacral nerve  root.    ADDITIONAL FINDINGS: None.    MUSCULOSKELETAL: Degenerative changes of the spine.      Impression    IMPRESSION:   1.  Acute diverticulitis again demonstrated at the descending/sigmoid  colon junction of the left lower quadrant. This appears progressed as  compared to 11/14/2023. No abscess identified. No free air at this  time. Trace pelvic fluid.  2.  No other acute abnormality.  3.  Stable nodular hypodense lesion along the course of the left-sided  sacral nerve root. This could be a perineural cyst versus tumor that  is nonspecific.  4.  Fatty liver.       Medications   sodium chloride 0.9% BOLUS 1,000 mL (1,000 mLs Intravenous $New Bag 1/8/24 0982)   iopamidol (ISOVUE-370) solution 100 mL (100 mLs Intravenous $Given 1/8/24 0946)   sodium chloride 0.9 % bag 500mL for CT scan flush use (66 mLs Intravenous $Given 1/8/24 0946)   acetaminophen (TYLENOL) tablet 1,000 mg (1,000 mg  Oral $Given 1/8/24 1009)       Assessments & Plan (with Medical Decision Making)   Robson Galeano is a 53 year old male who presents to the department evaluation of recurrence of the left lower quadrant abdominal pain reminiscent of previous diagnosis of diverticulitis.  In reviewing records, patient was initially diagnosed with diverticulitis 10/23/2023 for which she was started on ciprofloxacin and Flagyl antibiotics, unfortunately symptoms did not improve over the following weeks.  He was again seen in the emergency department 11/14/2023 and CT imaging again diagnosed diverticulitis, this time he was started on oral antibiotic Augmentin for 14 days and symptoms seem to have resolved for a few weeks.  Now over the past 24 hours symptoms have returned, without back pain, fever or chills.  CT imaging was again ordered to rule out abscess formation or complication such as perforation, imaging independently reviewed and agree with radiologist read that there appears to be an uncomplicated acute diverticulitis of similar region.  Consulted general surgeon Dr. Bond who is scheduled to perform colonoscopy 1/23/2024, he recommends oral antibiotic therapy until colonoscopy can safely be performed and assuming patient is without pain or active symptoms.  Patient in agreement with discharge plan including strict return instructions for changing symptoms or concerns.          Disclaimer:  This note consists of symbols derived from keyboarding, dictation, and/or voice recognition software.  As a result, there may be errors in the script that have gone undetected.  Please consider this when interpreting information found in the chart.      I have reviewed the nursing notes.    I have reviewed the findings, diagnosis, plan and need for follow up with the patient.             New Prescriptions    AMOXICILLIN-CLAVULANATE (AUGMENTIN) 875-125 MG TABLET    Take 1 tablet by mouth 2 times daily for 15 days       Final diagnoses:    Diverticulitis of large intestine without perforation or abscess, unspecified bleeding status       1/8/2024   [facility] EMERGENCY DEPT       Alberto Marx DO  01/08/24 1037

## 2024-01-08 NOTE — ED TRIAGE NOTES
LLQ pain that started yesterday.  Patient denies nausea/vomiting, diarrhea/constipation, fever, urinary symptoms.  History of diverticulitis.   Triage Assessment (Adult)       Row Name 01/08/24 0843          Triage Assessment    Airway WDL WDL        Respiratory WDL    Respiratory WDL WDL        Skin Circulation/Temperature WDL    Skin Circulation/Temperature WDL WDL        Cardiac WDL    Cardiac WDL WDL        Peripheral/Neurovascular WDL    Peripheral Neurovascular WDL WDL        Cognitive/Neuro/Behavioral WDL    Cognitive/Neuro/Behavioral WDL WDL

## 2024-01-08 NOTE — ED NOTES
Left side abd pain and pt states his left side feels hard. Pt has had diverticulitis twice and is concerned that he has it again. Symptoms started yesterday but pt has been noticing pain off and on over last few weeks

## 2024-01-09 NOTE — PROGRESS NOTES
Rheumatology Clinic Visit  [facility]  Mary Sargent, ALVARADO     Robson Galeano MRN# [id number]   YOB: 1970 Age: 54 year old   Date of Visit: 1/16/2024  Primary care provider: Jace Campbell          Assessment and Plan:     1.  Seropositive rheumatoid arthritis  2.  High-risk medication use    Patient presents today for follow up for his seropositive rheumatoid arthritis.  Unfortunately he has had multiple infections since his last visit.  He is been unable to start the Humira due to upper respiratory infections as well as multiple bouts of diverticulitis.  He is still on antibiotics for the diverticulitis.  He is scheduled for a colonoscopy next Tuesday.  He does feel that the diarrhea has improved, however he still has some abdominal pain.  Overall he feels that his joints are stable however he does have some swelling and stiffness.  Currently he feels it is manageable.  Physical examination today does show active synovitis over his second through fourth MCPs on the right as well as the dorsal aspect of his right wrist.  He does have full fist formation, slightly decreased on the right.  Previous laboratory evaluations were reviewed, results below.     He does have active inflammation on examination today, likely due to being unable to take his immunomodulatory medications.  At this time he will continue to hold them until his infection has cleared.  Once the infection is cleared and he is feeling better the plan will be to start Humira.  We will not resume methotrexate due to the multiple bouts of diverticulitis and diarrhea.  We will get new baseline labs prior to him starting the Humira given the multiple infections.  Plan to follow-up with me in 3 months, sooner if needed.  We did also discuss that prednisone may be an option if he does have a significant flare of his joints.  At this time he does not feel that he needs it.  He will continue to monitor symptoms.      Plan:   Schedule  follow-up with Mary Sargent PA-C in 3 months.   Labs: We'll want to do baseline labs prior to starting Humira.   Medication recommendations:  Start Humira 40mg/0.4mL  Every 14 days once all infections are clear   Stop Methotrexate and okay to stop Folic Acid as well    ALVARADO Bunch  Rheumatology         History of Present Illness:   Robson Galeano presents for evaluation of rheumatoid arthritis.      Rheumatological history:  Provider(s): Dr. Jackson, Dr. Calhoun  Last office visit: 04/19/2021  Pertinent lab history:  +CCP (251), Neg RF  Previous medications tried: NSAIDs, Methotrexate  Current medications: none    Interval history January 16, 2024:  He did get his vaccinations. He has been ill since then. He has had an URI and diverticulitis. He states that the congestion is getting better. He has had drainage from his eyes and ears. He has not taken any of his medications due to this. He will be getting a colonoscopy next week. The antibiotics have also caused him significant diarrhea.    His joints are bothering him. He is swollen. He has been taking Collagen.  He does not feel that he is in dire pain. He feels that his joints are stable.      Interval history October 17, 2023:  He gets diarrhea on the days that the takes his Methotrexate. He feels it is manageable. He has not taken any imodium. It does seem to relate to what he eats as well. He states that his hands have been swelling up. With working a lot the right hand/wrist gets much worse. He continues to have 1 hour of stiffness in the mornings.     PHI from consult of March 15, 2023:  Was diagnosed in 2019. At the time of diagnosis, his hands, elbows and shoulders were bothering him. He states previously getting injections. He does have back pain as well. He has been off of methotrexate for 5-6 months. He has tried medical cannabis, which helps. He was having relief with the methotrexate. He was taking it as a split dose. He has continued on the  folic acid.     No infection. No personal history of cancer. No cardiac history, including high blood pressure high cholesterol. He drinks 4-5 beers/week.                Review of Systems:     Constitutional: negative  Skin: negative  Eyes: negative  Ears/Nose/Throat: negative  Respiratory: No shortness of breath, dyspnea on exertion, cough, or hemoptysis  Cardiovascular: negative  Gastrointestinal: negative  Genitourinary: negative  Musculoskeletal: as above  Neurologic: negative  Psychiatric: negative  Hematologic/Lymphatic/Immunologic: negative  Endocrine: negative         Active Problem List:     Patient Active Problem List    Diagnosis Date Noted    Depression 01/08/2024     Priority: Medium    Essential hypertension 01/08/2024     Priority: Medium    RACHELL (obstructive sleep apnea) 01/08/2024     Priority: Medium    Polyarthritis 01/08/2024     Priority: Medium    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     Priority: Medium    Genital herpes simplex, unspecified site 10/24/2023     Priority: Medium    Chronic bilateral low back pain without sciatica 03/02/2023     Priority: Medium    Benign prostatic hyperplasia, unspecified whether lower urinary tract symptoms present 03/02/2023     Priority: Medium    Hypogonadism male 07/30/2021     Priority: Medium    Non-recurrent unilateral inguinal hernia without obstruction or gangrene 01/26/2021     Priority: Medium    Seropositive rheumatoid arthritis (H) 12/28/2019     Priority: Medium    Low testosterone in male 09/29/2019     Priority: Medium    Ocular migraine 08/31/2012     Priority: Medium     Formatting of this note might be different from the original.  Rare-last 10-11      Morbid obesity (H) 08/31/2012     Priority: Medium     Formatting of this note might be different from the original.              Past Medical History:   History reviewed. No pertinent past medical history.  History reviewed. No pertinent surgical history.         Social History:     Social  Detail Level: Detailed History     Socioeconomic History    Marital status:      Spouse name: Not on file    Number of children: Not on file    Years of education: Not on file    Highest education level: Not on file   Occupational History    Not on file   Tobacco Use    Smoking status: Former     Packs/day: .5     Types: Cigarettes     Quit date: 2002     Years since quittin.0    Smokeless tobacco: Never   Vaping Use    Vaping Use: Never used   Substance and Sexual Activity    Alcohol use: Not Currently    Drug use: Yes     Types: Marijuana     Comment: has marijuana card    Sexual activity: Not on file   Other Topics Concern    Not on file   Social History Narrative    Not on file     Social Determinants of Health     Financial Resource Strain: Low Risk  (10/19/2023)    Financial Resource Strain     Within the past 12 months, have you or your family members you live with been unable to get utilities (heat, electricity) when it was really needed?: No   Food Insecurity: Low Risk  (10/19/2023)    Food Insecurity     Within the past 12 months, did you worry that your food would run out before you got money to buy more?: No     Within the past 12 months, did the food you bought just not last and you didn t have money to get more?: No   Transportation Needs: Low Risk  (10/19/2023)    Transportation Needs     Within the past 12 months, has lack of transportation kept you from medical appointments, getting your medicines, non-medical meetings or appointments, work, or from getting things that you need?: No   Physical Activity: Not on file   Stress: Not on file   Social Connections: Not on file   Interpersonal Safety: Not on file   Housing Stability: Low Risk  (10/19/2023)    Housing Stability     Do you have housing? : Yes     Are you worried about losing your housing?: No          Family History:   History reviewed. No pertinent family history.         Allergies:     Allergies   Allergen Reactions    Bee Venom Other (See  "Comments)     Hallucinates, no anaphylaxis  Hallucinates, no anaphylaxis  Hallucinates, no anaphylaxis              Medications:     Current Outpatient Medications   Medication Sig Dispense Refill    amitriptyline (ELAVIL) 10 MG tablet Take 1 tablet (10 mg) by mouth At Bedtime 90 tablet 3    amoxicillin-clavulanate (AUGMENTIN) 875-125 MG tablet Take 1 tablet by mouth 2 times daily for 15 days 30 tablet 0    gabapentin (NEURONTIN) 300 MG capsule Take 1 capsule (300 mg) by mouth 2 times daily 180 capsule 3    Needle, Disp, (B-D HYPODERMIC NEEDLE) 18G X 1-1/2\" MISC 1 each once a week 12 each 3    Needle, Disp, (B-D HYPODERMIC NEEDLE) 22G X 1\" MISC 1 each once a week 12 each 3    Probiotic Product (FORTIFY DAILY PROBIOTIC PO)       Syringe Luer Slip (B-D SYRINGE LUER-MARIALUISA) 3 ML MISC 1 each once a week 12 each 3    testosterone cypionate (DEPOTESTOSTERONE) 200 MG/ML injection Inject 0.5 mLs (100 mg) into the muscle once a week 2 mL 4    valACYclovir (VALTREX) 1000 mg tablet Take 1 tablet (1,000 mg) by mouth daily 5 tablet 5    adalimumab (HUMIRA *CF*) 40 MG/0.4ML pen kit Inject 0.4 mLs (40 mg) Subcutaneous every 14 days Hold for signs of infection, then seek medical attention. (Patient not taking: Reported on 10/24/2023) 1 each 5    folic acid (FOLVITE) 1 MG tablet Take 2 tablets (2 mg) by mouth daily (Patient not taking: Reported on 11/14/2023) 180 tablet 3    methotrexate 2.5 MG tablet Take 8 tablets (20 mg) by mouth every 7 days Labs every 8 - 12 weeks for refills. (Patient not taking: Reported on 1/16/2024) 96 tablet 0    valACYclovir (VALTREX) 500 MG tablet Take 1 tablet (500 mg) by mouth daily (Patient not taking: Reported on 11/14/2023) 90 tablet 3            Physical Exam:   Blood pressure 134/72, pulse 80, resp. rate 20, height 1.676 m (5' 6\"), weight 97.5 kg (215 lb), SpO2 94%.  Wt Readings from Last 6 Encounters:   01/15/24 97.5 kg (215 lb)   01/08/24 97.5 kg (215 lb)   11/14/23 97.5 kg (215 lb)   10/17/23 99.7 " Continue Regimen: Aldara M-F x 6 weeks kg (219 lb 14.4 oz)   04/07/23 99.1 kg (218 lb 6.4 oz)   04/06/23 99.8 kg (220 lb 1.6 oz)     Constitutional: well-developed, appearing stated age; cooperative  Eyes: nl conjunctiva, sclera  ENT: nl external ears, nose, hearing, lips,  Neck: no visible mass or thyroid enlargement  Resp: No shortness of breath with normal conversation  MS: He does have active synovitis over  the right second, third and 4th MCP.  Swelling over the dorsum of the right wrist.  Slight decreased fist formation on the right.  Normal fist formation on the left.  No significant tenderness to palpation of his MCPs or PIPs.  Skin: no nail pitting, alopecia, rash, nodules or lesions.   Psych: nl judgement, orientation, memory, affect.           Data:   Imaging:  Xray foot/feet 10/18/2019  FINDINGS:  Single image showing both feet. Bony mineralization within normal limits. Joint spaces appear maintained. No erosions are seen. No abnormal soft tissue calcifications.    Xray hands 10/18/2019  IMPRESSION: Degenerative/arthritic changes in both hands as described. No definite erosions or chondrocalcinosis.    Xray SI joints 10/18/2019  FINDINGS:  Bony structures and sacroiliac joints are unremarkable. The SI joints appear intact without evidence of bony ankylosis or erosive changes.    Laboratory:  3/2/2023  Creatinine 1.13, GFR 77  Albumin 4.6  ALT 47, AST 29  CBC within normal limits  Hepatitis C nonreactive    8/17/2023  Albumin 4.7  ALT 52, AST 30  CRP less than 3.0  CBC within normal limits  Sed rate 31  Creatinine 0.99, GFR 90    1/8/2024  Creatinine 0.94, GFR greater than 90  Albumin 4.6  ALT 39, AST 18  White blood cell count 9.0, hemoglobin 14.1, platelet count 170

## 2024-01-15 ASSESSMENT — PAIN SCALES - GENERAL: PAINLEVEL: MILD PAIN (3)

## 2024-01-16 ENCOUNTER — OFFICE VISIT (OUTPATIENT)
Dept: RHEUMATOLOGY | Facility: CLINIC | Age: 54
End: 2024-01-16
Payer: COMMERCIAL

## 2024-01-16 VITALS
OXYGEN SATURATION: 94 % | HEIGHT: 66 IN | RESPIRATION RATE: 20 BRPM | SYSTOLIC BLOOD PRESSURE: 134 MMHG | DIASTOLIC BLOOD PRESSURE: 72 MMHG | HEART RATE: 80 BPM | WEIGHT: 215 LBS | BODY MASS INDEX: 34.55 KG/M2

## 2024-01-16 DIAGNOSIS — M05.9 SEROPOSITIVE RHEUMATOID ARTHRITIS (H): Primary | ICD-10-CM

## 2024-01-16 DIAGNOSIS — Z79.899 HIGH RISK MEDICATION USE: ICD-10-CM

## 2024-01-16 PROCEDURE — 99214 OFFICE O/P EST MOD 30 MIN: CPT | Performed by: PHYSICIAN ASSISTANT

## 2024-01-16 NOTE — PATIENT INSTRUCTIONS
After Visit Instructions:     Thank you for coming to [facility] for your care. It is my goal to partner with you to help you reach your optimal state of health.     Plan:     Schedule follow-up with Mary Sargent PA-C in 3 months.   Labs: We'll want to do baseline labs prior to starting Humira.   Medication recommendations:  Start Humira 40mg/0.4mL  Every 14 days once all infections are clear   Stop Methotrexate and okay to stop Folic Acid as well      Mary Sargent PA-C  [facility]  [facility]    Contact information: [facility]  Clinic Number:  [phone]  Please call or send a [employer] message with any questions about your care

## 2024-01-20 ENCOUNTER — ANESTHESIA EVENT (OUTPATIENT)
Dept: GASTROENTEROLOGY | Facility: CLINIC | Age: 54
End: 2024-01-20
Payer: COMMERCIAL

## 2024-01-20 ASSESSMENT — LIFESTYLE VARIABLES: TOBACCO_USE: 1

## 2024-01-21 NOTE — ANESTHESIA PREPROCEDURE EVALUATION
Anesthesia Pre-Procedure Evaluation    Patient: Robson Galeano   MRN: [id number] : 1970        Procedure : Procedure(s):  Colonoscopy          No past medical history on file.   History reviewed. No pertinent surgical history.   Allergies   Allergen Reactions    Bee Venom Other (See Comments)     Hallucinates, no anaphylaxis  Hallucinates, no anaphylaxis  Hallucinates, no anaphylaxis        Social History     Tobacco Use    Smoking status: Former     Packs/day: .5     Types: Cigarettes     Quit date: 2002     Years since quittin.0    Smokeless tobacco: Never   Substance Use Topics    Alcohol use: Not Currently      Wt Readings from Last 1 Encounters:   01/15/24 97.5 kg (215 lb)        Anesthesia Evaluation            ROS/MED HX  ENT/Pulmonary:     (+) sleep apnea,               tobacco use, Past use,                       Neurologic:  - neg neurologic ROS     Cardiovascular:     (+)  hypertension- -   -  - -                                      METS/Exercise Tolerance:     Hematologic:  - neg hematologic  ROS     Musculoskeletal:  - neg musculoskeletal ROS     GI/Hepatic:  - neg GI/hepatic ROS     Renal/Genitourinary:     (+)        BPH,      Endo:     (+)               Obesity,       Psychiatric/Substance Use:     (+) psychiatric history depression   Recreational drug usage: Cannabis.    Infectious Disease:  - neg infectious disease ROS     Malignancy:       Other: Comment: Seropositive rheumatoid arthritis           Physical Exam    Airway  airway exam normal           Respiratory Devices and Support         Dental       (+) Minor Abnormalities - some fillings, tiny chips      Cardiovascular   cardiovascular exam normal          Pulmonary   pulmonary exam normal                OUTSIDE LABS:  CBC:   Lab Results   Component Value Date    WBC 9.0 2024    WBC 5.1 2023    HGB 14.1 2024    HGB 14.3 2023    HCT 41.0 2024    HCT 41.4 2023     2024      "(L) 11/21/2023     BMP:   Lab Results   Component Value Date     01/08/2024     11/14/2023    POTASSIUM 3.9 01/08/2024    POTASSIUM 3.9 11/14/2023    CHLORIDE 109 (H) 01/08/2024    CHLORIDE 108 (H) 11/14/2023    CO2 24 01/08/2024    CO2 25 11/14/2023    BUN 13.5 01/08/2024    BUN 12.9 11/14/2023    CR 0.94 01/08/2024    CR 0.96 11/21/2023     (H) 01/08/2024    GLC 83 11/14/2023     COAGS: No results found for: \"PTT\", \"INR\", \"FIBR\"  POC: No results found for: \"BGM\", \"HCG\", \"HCGS\"  HEPATIC:   Lab Results   Component Value Date    ALBUMIN 4.6 01/08/2024    PROTTOTAL 7.0 01/08/2024    ALT 39 01/08/2024    AST 18 01/08/2024    ALKPHOS 71 01/08/2024    BILITOTAL 0.8 01/08/2024     OTHER:   Lab Results   Component Value Date    A1C 5.6 03/02/2023    JEF 9.2 01/08/2024    SED 43 (H) 11/21/2023       Anesthesia Plan    ASA Status:  3    NPO Status:  NPO Appropriate    Anesthesia Type: General.      Maintenance: Balanced.        Consents    Anesthesia Plan(s) and associated risks, benefits, and realistic alternatives discussed. Questions answered and patient/representative(s) expressed understanding.     - Discussed:     - Discussed with:  Patient            Postoperative Care            Comments:               ANDREW Coronado CRNA    I have reviewed the pertinent notes and labs in the chart from the past 30 days and (re)examined the patient.  Any updates or changes from those notes are reflected in this note.              # Obesity: Estimated body mass index is 34.7 kg/m  as calculated from the following:    Height as of 1/15/24: 1.676 m (5' 6\").    Weight as of 1/15/24: 97.5 kg (215 lb).      "

## 2024-01-23 ENCOUNTER — ANESTHESIA (OUTPATIENT)
Dept: GASTROENTEROLOGY | Facility: CLINIC | Age: 54
End: 2024-01-23
Payer: COMMERCIAL

## 2024-01-23 ENCOUNTER — HOSPITAL ENCOUNTER (OUTPATIENT)
Facility: CLINIC | Age: 54
Discharge: HOME | End: 2024-01-23
Attending: SURGERY | Admitting: SURGERY
Payer: COMMERCIAL

## 2024-01-23 VITALS
HEIGHT: 66 IN | OXYGEN SATURATION: 98 % | WEIGHT: 215 LBS | TEMPERATURE: 97.2 F | BODY MASS INDEX: 34.55 KG/M2 | DIASTOLIC BLOOD PRESSURE: 93 MMHG | SYSTOLIC BLOOD PRESSURE: 131 MMHG | HEART RATE: 68 BPM | RESPIRATION RATE: 17 BRPM

## 2024-01-23 LAB — COLONOSCOPY: NORMAL

## 2024-01-23 PROCEDURE — 250N000011 HC RX IP 250 OP 636: Performed by: NURSE ANESTHETIST, CERTIFIED REGISTERED

## 2024-01-23 PROCEDURE — 88305 TISSUE EXAM BY PATHOLOGIST: CPT | Mod: TC | Performed by: SURGERY

## 2024-01-23 PROCEDURE — 250N000009 HC RX 250: Performed by: SURGERY

## 2024-01-23 PROCEDURE — 88305 TISSUE EXAM BY PATHOLOGIST: CPT | Mod: 26

## 2024-01-23 PROCEDURE — 45385 COLONOSCOPY W/LESION REMOVAL: CPT | Mod: PT | Performed by: SURGERY

## 2024-01-23 PROCEDURE — 370N000017 HC ANESTHESIA TECHNICAL FEE, PER MIN: Performed by: SURGERY

## 2024-01-23 PROCEDURE — 258N000003 HC RX IP 258 OP 636: Performed by: SURGERY

## 2024-01-23 PROCEDURE — 45382 COLONOSCOPY W/CONTROL BLEED: CPT | Performed by: SURGERY

## 2024-01-23 RX ORDER — NALOXONE HYDROCHLORIDE 0.4 MG/ML
0.4 INJECTION, SOLUTION INTRAMUSCULAR; INTRAVENOUS; SUBCUTANEOUS
Status: DISCONTINUED | OUTPATIENT
Start: 2024-01-23 | End: 2024-01-23 | Stop reason: HOSPADM

## 2024-01-23 RX ORDER — SODIUM CHLORIDE, SODIUM LACTATE, POTASSIUM CHLORIDE, CALCIUM CHLORIDE 600; 310; 30; 20 MG/100ML; MG/100ML; MG/100ML; MG/100ML
INJECTION, SOLUTION INTRAVENOUS CONTINUOUS
Status: DISCONTINUED | OUTPATIENT
Start: 2024-01-23 | End: 2024-01-23 | Stop reason: HOSPADM

## 2024-01-23 RX ORDER — NALOXONE HYDROCHLORIDE 0.4 MG/ML
0.2 INJECTION, SOLUTION INTRAMUSCULAR; INTRAVENOUS; SUBCUTANEOUS
Status: DISCONTINUED | OUTPATIENT
Start: 2024-01-23 | End: 2024-01-23 | Stop reason: HOSPADM

## 2024-01-23 RX ORDER — LIDOCAINE 40 MG/G
CREAM TOPICAL
Status: DISCONTINUED | OUTPATIENT
Start: 2024-01-23 | End: 2024-01-23 | Stop reason: HOSPADM

## 2024-01-23 RX ORDER — ONDANSETRON 2 MG/ML
4 INJECTION INTRAMUSCULAR; INTRAVENOUS EVERY 6 HOURS PRN
Status: DISCONTINUED | OUTPATIENT
Start: 2024-01-23 | End: 2024-01-23 | Stop reason: HOSPADM

## 2024-01-23 RX ORDER — FLUMAZENIL 0.1 MG/ML
0.2 INJECTION, SOLUTION INTRAVENOUS
Status: DISCONTINUED | OUTPATIENT
Start: 2024-01-23 | End: 2024-01-23 | Stop reason: HOSPADM

## 2024-01-23 RX ORDER — ONDANSETRON 4 MG/1
4 TABLET, ORALLY DISINTEGRATING ORAL EVERY 6 HOURS PRN
Status: DISCONTINUED | OUTPATIENT
Start: 2024-01-23 | End: 2024-01-23 | Stop reason: HOSPADM

## 2024-01-23 RX ORDER — PROCHLORPERAZINE MALEATE 10 MG
10 TABLET ORAL EVERY 6 HOURS PRN
Status: DISCONTINUED | OUTPATIENT
Start: 2024-01-23 | End: 2024-01-23 | Stop reason: HOSPADM

## 2024-01-23 RX ORDER — PROPOFOL 10 MG/ML
INJECTION, EMULSION INTRAVENOUS CONTINUOUS PRN
Status: DISCONTINUED | OUTPATIENT
Start: 2024-01-23 | End: 2024-01-23

## 2024-01-23 RX ADMIN — LIDOCAINE HYDROCHLORIDE 0.1 ML: 10 INJECTION, SOLUTION EPIDURAL; INFILTRATION; INTRACAUDAL; PERINEURAL at 12:14

## 2024-01-23 RX ADMIN — SODIUM CHLORIDE, POTASSIUM CHLORIDE, SODIUM LACTATE AND CALCIUM CHLORIDE: 600; 310; 30; 20 INJECTION, SOLUTION INTRAVENOUS at 12:14

## 2024-01-23 RX ADMIN — PROPOFOL 200 MCG/KG/MIN: 10 INJECTION, EMULSION INTRAVENOUS at 12:32

## 2024-01-23 ASSESSMENT — ACTIVITIES OF DAILY LIVING (ADL): ADLS_ACUITY_SCORE: 35

## 2024-01-23 NOTE — ANESTHESIA CARE TRANSFER NOTE
Patient: Robson Galeano    Procedure: Procedure(s):  COLONOSCOPY, FLEXIBLE, WITH LESION REMOVAL USING SNARE  COLONOSCOPY, WITH HEMORRHAGE CONTROL       Diagnosis: LLQ abdominal pain [R10.32]  Generalized abdominal pain [R10.84]  History of diverticulitis [Z87.19]  Blood in stool [K92.1]  Diagnosis Additional Information: No value filed.    Anesthesia Type:   General     Note:    Oropharynx: oropharynx clear of all foreign objects and spontaneously breathing  Level of Consciousness: awake  Oxygen Supplementation: room air    Independent Airway: airway patency satisfactory and stable  Dentition: dentition unchanged  Vital Signs Stable: post-procedure vital signs reviewed and stable  Report to RN Given: handoff report given  Patient transferred to: Phase II    Handoff Report: Identifed the Patient, Identified the Reponsible Provider, Reviewed the pertinent medical history, Discussed the surgical course, Reviewed Intra-OP anesthesia mangement and issues during anesthesia, Set expectations for post-procedure period and Allowed opportunity for questions and acknowledgement of understanding      Vitals:  Vitals Value Taken Time   BP     Temp     Pulse     Resp     SpO2         Electronically Signed By: Aissatou West CRNA, APRN CRNA  January 23, 2024  12:58 PM

## 2024-01-23 NOTE — PROGRESS NOTES
[place] [facility] DISCHARGE NOTE    Patient discharged to home at 1:20 PM via ambulation. Accompanied by spouse and staff. Discharge instructions reviewed with patient and spouse, opportunity offered to ask questions. Prescriptions - None ordered for discharge. All belongings sent with patient.    Nannette Fink RN

## 2024-01-23 NOTE — LETTER
January 26, 2024      Robson Galeano  [address] [address] [facility] [address]        Dear ,    We are writing to inform you of your test results.    Your pathology report was:    Showed an Adenomatous polyp. This is a benign (not cancerous) growth; however these can become cancer over time. This polyp is usually removed completely at the time of the biopsy. Because it is an Adenomatous polyp you do have a slight higher risk for colon cancer. This is why you will need a repeat colonoscopy in approximately 7 years .  If you do have further questions please don t hesitate to call the below number.      To make an appointment Please call [phone] for  [facility] to schedule a follow up appointment in 2 weeks IF YOU NEED TO DISCUSS THIS.  If you want to be seen at the [facility] with a local surgeon then call  to get an appointment with Bria DOW.      Resulted Orders   Surgical Pathology Exam   Result Value Ref Range    Case Report       Surgical Pathology Report                         Case: [id number]                                  Authorizing Provider:  Mason Herrera MD Collected:           01/23/2024 12:44 PM          Ordering Location:     [facility]   Received:            01/23/2024 02:36 PM                                 [place]                                                                      Pathologist:           Tayler Benson MD                                                           Specimens:   A) - Large Intestine, Colon, Ascending, mid ascending colon polyp                                   B) - Large Intestine, Colon, polyp at 10 cm                                                Final Diagnosis       A.  Mid ascending colon, polyp, biopsy/polypectomy:  -Tubular adenoma; negative for high-grade dysplasia or malignancy.    B.  Colon, polyp at 10 cm, biopsy/polypectomy:  -Hyperplastic polyp.       Clinical Information        "Diverticulitis.  Left lower quadrant abdominal pain.  Blood in stool.      Gross Description       A(1). Large Intestine, Colon, Ascending, mid ascending colon polyp:  The specimen is received in formalin, labeled with the patient's name, medical record number and other identifying information designated \"mid ascending colon polyp\". It consists of a single, 2.0 cm pale-tan polyp (inked black) which is sectioned into 9 pieces, is submitted entirely in 1 cassette.    B(2). Large Intestine, Colon, polyp at 10 cm:  The specimen is received in formalin, labeled with the patient's name, medical record number and other identifying information designated \"colon polyp at 10 cm\". It consists of 3, 0.4-1.0 cm pale-tan polyp fragments which are differentially inked, are submitted entirely as follows:    B1-3 polyps (1 inked red-intact/1 inked blue-bisected/1 inked black-5 pieces)   (VICTOR M Henderson (ASCP)      Microscopic Description       A and B.  Microscopic examination is performed.         Performing Labs       The technical component of this testing was completed at [facility]      Case Images         If you have any questions or concerns, please call the clinic at the number listed above.       Sincerely,      Mason Herrera MD            "

## 2024-01-23 NOTE — ANESTHESIA POSTPROCEDURE EVALUATION
Patient: Robson Galeano    Procedure: Procedure(s):  COLONOSCOPY, FLEXIBLE, WITH LESION REMOVAL USING SNARE  COLONOSCOPY, WITH HEMORRHAGE CONTROL       Anesthesia Type:  General    Note:  Disposition: Outpatient   Postop Pain Control: Uneventful            Sign Out: Well controlled pain   PONV: No   Neuro/Psych: Uneventful            Sign Out: Acceptable/Baseline neuro status   Airway/Respiratory: Uneventful            Sign Out: Acceptable/Baseline resp. status   CV/Hemodynamics: Uneventful            Sign Out: Acceptable CV status; No obvious hypovolemia; No obvious fluid overload   Other NRE: NONE   DID A NON-ROUTINE EVENT OCCUR? No           Last vitals:  Vitals:    01/23/24 1156   BP: 128/81   Pulse: 60   Resp: 20   Temp: 36.6  C (97.9  F)   SpO2: 97%       Electronically Signed By: Aissatou West CRNA, APRN CRNA  January 23, 2024  12:58 PM

## 2024-01-23 NOTE — H&P
ENDOSCOPY PRE-SEDATION H&P FOR OUTPATIENT PROCEDURES    Robson Galeano  [id number]  1970    Procedure:   Colonoscopy possible biopsy, possible polypectomy, with MAC sedation.     Pre-procedure diagnosis: history of diverticulitis and mother had colon cancer     Past medical history: History reviewed. No pertinent past medical history.    Past surgical history: History reviewed. No pertinent surgical history.    Current Facility-Administered Medications   Medication    lactated ringers infusion    lidocaine (LMX4) kit    lidocaine 1 % 0.1-1 mL    sodium chloride (PF) 0.9% PF flush 3 mL    sodium chloride (PF) 0.9% PF flush 3 mL       Allergies   Allergen Reactions    Bee Venom Other (See Comments)     Hallucinates, no anaphylaxis  Hallucinates, no anaphylaxis  Hallucinates, no anaphylaxis         History of Anesthesia/Sedation Problems: no    Physical Exam:    Mental status: alert  Heart: Normal  Lung: Normal  Assessment of patient's airway: Normal  Other as pertinent for procedure: None     ASA Score: See Provation note    Mallampati score:  II - Faucial pillars and soft palate may be seen, but uvula is masked by the base of the tongue    Assessment/Plan:     The patient is an appropriate candidate to receive sedation.    Informed consent was discussed with the patient/family, including the risks, benefits, potential complications and any alternative options associated with sedation.    Patient assessment completed just prior to sedation and while under constant observation by the provider. Condition determined to be adequate for proceeding with sedation.    The specific risks for the procedure were discussed with the patient at the time of informed consent and include but are not limited to perforation which could require surgery, missing significant neoplasm or lesion, hemorrhage and adverse sedative complication.      Mason Herrera MD

## 2024-01-25 LAB
PATH REPORT.COMMENTS IMP SPEC: NORMAL
PATH REPORT.COMMENTS IMP SPEC: NORMAL
PATH REPORT.FINAL DX SPEC: NORMAL
PATH REPORT.GROSS SPEC: NORMAL
PATH REPORT.MICROSCOPIC SPEC OTHER STN: NORMAL
PATH REPORT.RELEVANT HX SPEC: NORMAL
PHOTO IMAGE: NORMAL

## 2024-01-30 ENCOUNTER — LAB (OUTPATIENT)
Dept: LAB | Facility: CLINIC | Age: 54
End: 2024-01-30
Payer: COMMERCIAL

## 2024-01-30 DIAGNOSIS — Z79.899 HIGH RISK MEDICATION USE: ICD-10-CM

## 2024-01-30 DIAGNOSIS — M05.9 SEROPOSITIVE RHEUMATOID ARTHRITIS (H): ICD-10-CM

## 2024-01-30 LAB
ALBUMIN SERPL BCG-MCNC: 4.5 G/DL (ref 3.5–5.2)
ALT SERPL W P-5'-P-CCNC: 34 U/L (ref 0–70)
AST SERPL W P-5'-P-CCNC: 20 U/L (ref 0–45)
CREAT SERPL-MCNC: 0.95 MG/DL (ref 0.67–1.17)
CRP SERPL-MCNC: <3 MG/L
EGFRCR SERPLBLD CKD-EPI 2021: >90 ML/MIN/1.73M2
ERYTHROCYTE [DISTWIDTH] IN BLOOD BY AUTOMATED COUNT: 13.2 % (ref 10–15)
ERYTHROCYTE [SEDIMENTATION RATE] IN BLOOD BY WESTERGREN METHOD: 30 MM/HR (ref 0–20)
HCT VFR BLD AUTO: 42.3 % (ref 40–53)
HGB BLD-MCNC: 14.4 G/DL (ref 13.3–17.7)
MCH RBC QN AUTO: 31.2 PG (ref 26.5–33)
MCHC RBC AUTO-ENTMCNC: 34 G/DL (ref 31.5–36.5)
MCV RBC AUTO: 92 FL (ref 78–100)
PLATELET # BLD AUTO: 166 10E3/UL (ref 150–450)
RBC # BLD AUTO: 4.61 10E6/UL (ref 4.4–5.9)
WBC # BLD AUTO: 4.9 10E3/UL (ref 4–11)

## 2024-01-30 PROCEDURE — 85027 COMPLETE CBC AUTOMATED: CPT

## 2024-01-30 PROCEDURE — 82565 ASSAY OF CREATININE: CPT

## 2024-01-30 PROCEDURE — 82040 ASSAY OF SERUM ALBUMIN: CPT

## 2024-01-30 PROCEDURE — 36415 COLL VENOUS BLD VENIPUNCTURE: CPT

## 2024-01-30 PROCEDURE — 84450 TRANSFERASE (AST) (SGOT): CPT

## 2024-01-30 PROCEDURE — 85652 RBC SED RATE AUTOMATED: CPT

## 2024-01-30 PROCEDURE — 84460 ALANINE AMINO (ALT) (SGPT): CPT

## 2024-01-30 PROCEDURE — 86140 C-REACTIVE PROTEIN: CPT

## 2024-02-07 NOTE — PROGRESS NOTES
Medication Therapy Management (MTM) Encounter    ASSESSMENT:                            Medication Adherence/Access: No issues identified    Seropositive Rheumatoid Arthritis: Recommend continue Humira 40 mg every 14 days.    Herpes Simplex: Stable    Vaccines: Per ACIP guidelines, patient is up to date on their vaccinations.     Hypogonadism: Stable. I will check with his primary care provider to see if they are willing to change to Xyosted which is testosterone in an auto-injector device.    PLAN:                            Nice job completing your first injection of Humira!  If you notice any injection site reaction (redness or itching) please use hydrocortisone cream to treat. If it's very severe, contact me.  Continue current medication regimen  I called [facility] and had them refill the testosterone    Follow-up: Return in 6 weeks (on 3/21/2024) for Follow up, with me, using a phone visit.    SUBJECTIVE/OBJECTIVE:                          Robson Galeano is a 54 year old male called via secure video for a follow-up visit from 10/19/2023. Patient was accompanied by his wife, Diamond. He was referred to me from Mary Sargent PA-C.       Reason for visit: Humira follow up.    Allergies/ADRs: Reviewed in chart  Past Medical History: Reviewed in chart  Tobacco: He reports that he quit smoking about 22 years ago. His smoking use included cigarettes. He smoked an average of 0.5 packs per day. He has never used smokeless tobacco.  Alcohol: 1-3 beverages / week  Other Substance Use: Cannabis gummies, flower, and balm.    Medication Adherence/Access: No issues identified    Seropositive Rheumatoid Arthritis:   Humira 40 mg subcutaneously every 14 days - completed first injection during our visit today without issue.    Reports is off antibiotics now and feeling better from his multiple bouts of diverticulitis. Has changed his diet to help treat his diverticulitis and stomach problems. Now eating more  fish and pork and less red meat. Reduced his alcohol intake as that was also exacerbating his GI symptoms. Problems seemed to start after he received COVID booster and flu shot in October. He currently reports swelling in hands and ankles. States his hips are sore and that he feels his arthritis is worse today because of the rain. Feels fatigued and wants to get back to his old self. Has not used any ibuprofen or acetaminophen for treatment as those tend to upset his stomach. Uses THC gummies to help with pain and reports that is helping. He and his wife came to visit prepared to complete first injection of Humira.    Previous therapies: methotrexate (stopped due to multiple bouts of diverticulitis and diarrhea)    CBC RESULTS:   Recent Labs   Lab Test 01/30/24  0751   WBC 4.9   RBC 4.61   HGB 14.4   HCT 42.3   MCV 92   MCH 31.2   MCHC 34.0   RDW 13.2        Creatinine   Date Value Ref Range Status   01/30/2024 0.95 0.67 - 1.17 mg/dL Final   01/08/2024 0.94 0.67 - 1.17 mg/dL Final   11/21/2023 0.96 0.67 - 1.17 mg/dL Final     Liver Function Studies -   Recent Labs   Lab Test 01/30/24  0751 01/08/24  0921   PROTTOTAL  --  7.0   ALBUMIN 4.5 4.6   BILITOTAL  --  0.8   ALKPHOS  --  71   AST 20 18   ALT 34 39     Herpes Simplex:   Valacyclovir 500 mg daily  Valacyclovir 1000 mg daily as needed for flare    Reports it was changed to daily, preventative dose by his provider due to Humira therapy being immunocompromising. Hasn't had a flare recently, but knows when they are coming on and to increase the 1000 mg tablet if needed.    Vaccines:  Immunization History   Administered Date(s) Administered    COVID-19 12+ (2023-24) (MODERNA) 10/19/2023    COVID-19 MONOVALENT 12+ (Pfizer) 12/07/2021    COVID-19 Monovalent 18+ (Moderna) 03/19/2021, 04/16/2021    Influenza Vaccine >6 months,quad, PF 10/29/2019, 10/19/2023    Pneumo Conj 13-V (2010&after) 10/29/2019    Pneumococcal 23 valent 12/27/2019    TDAP (Adacel,Boostrix)  09/18/2013, 11/19/2022    Zoster recombinant adjuvanted (SHINGRIX) 12/07/2021, 04/09/2022     Hypogonadism:  Testosterone cypionate 100mg weekly on Fridays (wife helps administer)    Patient states he needs a refill of the testosterone, but not on the injection supplies. Fills at [facility]. Would like to go back to formulation he was on previously in clinic as that was much easier to give than the current one is. States his insurance didn't want to cover that formulation due to cost.    Today's Vitals: There were no vitals taken for this visit.  ----------------  Post Discharge Medication Reconciliation Status: discharge medications reconciled, continue medications without change.    I spent 21 minutes with this patient today. All changes were made via collaborative practice agreement with Mary Sargent. A copy of the visit note was provided to the patient's provider(s).    A summary of these recommendations was sent via [employer].    Robson Stevens, PharmD  Medication Therapy Management Pharmacist  [facility]  Phone: [phone]     Telemedicine Visit Details  Type of service:  telephone call  Start Time:  10:37 AM  End Time:  10:58 AM     Medication Therapy Recommendations  Hypogonadism male    Current Medication: testosterone cypionate (DEPOTESTOSTERONE) 200 MG/ML injection   Rationale: Patient prefers not to take - Adherence - Adherence   Recommendation: Change Medication - Xyosted 100 MG/0.5ML Soaj   Status: Contact Provider - Awaiting Response

## 2024-02-08 ENCOUNTER — VIRTUAL VISIT (OUTPATIENT)
Dept: PALLIATIVE MEDICINE | Facility: OTHER | Age: 54
End: 2024-02-08
Attending: PHYSICIAN ASSISTANT
Payer: COMMERCIAL

## 2024-02-08 DIAGNOSIS — E29.1 HYPOGONADISM MALE: ICD-10-CM

## 2024-02-08 DIAGNOSIS — B00.9 HSV (HERPES SIMPLEX VIRUS) INFECTION: ICD-10-CM

## 2024-02-08 DIAGNOSIS — M05.9 SEROPOSITIVE RHEUMATOID ARTHRITIS (H): Primary | ICD-10-CM

## 2024-02-08 DIAGNOSIS — Z71.85 VACCINE COUNSELING: ICD-10-CM

## 2024-02-08 NOTE — PATIENT INSTRUCTIONS
"Recommendations from today's MTM visit:                                                       Nice job completing your first injection of Humira!  If you notice any injection site reaction (redness or itching) please use hydrocortisone cream to treat. If it's very severe, contact me.  Continue current medication regimen  I called [facility] and had them refill the testosterone    Follow-up: Return in 6 weeks (on 3/21/2024) for Follow up, with me, using a phone visit.    It was great speaking with you today.  I value your experience and would be very thankful for your time in providing feedback in our clinic survey. In the next few days, you may receive an email or text message from [facility] [employer] with a link to a survey related to your  clinical pharmacist.\"     To schedule another MTM appointment, please call the clinic directly or you may call the MTM scheduling line at [phone] or toll-free at [phone].     My Clinical Pharmacist's contact information:                                                      Please feel free to contact me with any questions or concerns you have.      Robson Stevens, PharmD  Medication Therapy Management Pharmacist  [facility]  Phone: [phone]    "

## 2024-02-08 NOTE — Clinical Note
Jace,  I am an MTM pharmacist in rheumatology and met with patient today for a medication review. He would like to see if his insurance would cover the Xyosted (subcutaneous testosterone) in place of his current order so that it is more comfortable and to reduce risk of accidentally using the drawing needle for injection (did that one time in the past). He states he was previously using the subcutaneous formuation when he was getting the shots in clinic. I'm guessing it would require a prior authorization, but could you send a new order and we can see if we can get it authorized by his insurance? If they won't cover it he will continue with his current formulation.  Thank you,  Robson Stevens, PharmD Medication Therapy Management Pharmacist [facility] Phone: [phone]

## 2024-02-08 NOTE — Clinical Note
Patient and his wife administered first dose of Humira during our visit today without any issues. I'm scheduled for a telephone visit with him on 3/21, do you want any labs drawn around then or should I have him do labs when follows up with you in April?  Robson

## 2024-02-09 ENCOUNTER — TELEPHONE (OUTPATIENT)
Dept: FAMILY MEDICINE | Facility: CLINIC | Age: 54
End: 2024-02-09
Payer: COMMERCIAL

## 2024-02-09 DIAGNOSIS — E29.1 HYPOGONADISM MALE: Primary | ICD-10-CM

## 2024-02-09 NOTE — TELEPHONE ENCOUNTER
Prior Authorization Retail Medication Request    Medication/Dose: Xyosted 75mg/0.5ml  Diagnosis and ICD code (if different than what is on RX):    New/renewal/insurance change PA/secondary ins. PA:  Previously Tried and Failed:    Rationale:      Insurance   Primary: [employer]  Insurance ID:  [id number]    Secondary (if applicable):  Insurance ID:      Pharmacy Information (if different than what is on RX)  Name:    Phone:    Fax:      Thank you,  Priyanka Hand, Pharmacy Technician  [facility]    [phone]

## 2024-02-09 NOTE — TELEPHONE ENCOUNTER
"Testosterone Rx changed due to patient preference. See message from [facility] pharmacist that he was working with below.     \"Jace,     I am an MT pharmacist in rheumatology and met with patient today for a medication review. He would like to see if his insurance would cover the Xyosted (subcutaneous testosterone) in place of his current order so that it is more comfortable and to reduce risk of accidentally using the drawing needle for injection (did that one time in the past). He states he was previously using the subcutaneous formuation when he was getting the shots in clinic. I'm guessing it would require a prior authorization, but could you send a new order and we can see if we can get it authorized by his insurance? If they won't cover it he will continue with his current formulation.     Thank you,     Robson Stevens, PharmD   Medication Therapy Management Pharmacist   [facility]   Phone: [phone] \"    Jace Campbell PA-C   "

## 2024-02-23 ENCOUNTER — TELEPHONE (OUTPATIENT)
Dept: RHEUMATOLOGY | Facility: CLINIC | Age: 54
End: 2024-02-23
Payer: COMMERCIAL

## 2024-02-23 DIAGNOSIS — Z79.899 HIGH RISK MEDICATION USE: ICD-10-CM

## 2024-02-23 DIAGNOSIS — M05.9 SEROPOSITIVE RHEUMATOID ARTHRITIS (H): Primary | ICD-10-CM

## 2024-02-23 NOTE — TELEPHONE ENCOUNTER
Retail Pharmacy Prior Authorization Team   Phone: [phone]    PA Initiation    Medication: XYOSTED 50 MG/0.5ML SC SOAJ  Insurance Company: [employer] - Phone [phone] Fax [phone]  Pharmacy Filling the Rx: [place], [place] - [address] [place] [address]  Filling Pharmacy Phone: [phone]  Filling Pharmacy Fax:    Start Date: 2/23/2024

## 2024-02-28 NOTE — TELEPHONE ENCOUNTER
Spoke with patient. He would like to continue with the injection.       Preferred Pharmacy:   [facility], [place] - [address] [place], [place] [address] [address] [place] [id number]  Phone: [phone] Fax: [phone]

## 2024-02-28 NOTE — TELEPHONE ENCOUNTER
Please call Robson and let him know that the new prescription for his testosterone was denied by his insurance. I recommend switching to either topical, or to continue using the injection.     Please let me know what he would prefer.     Jace Campbell PA-C

## 2024-02-29 ENCOUNTER — LAB (OUTPATIENT)
Dept: LAB | Facility: CLINIC | Age: 54
End: 2024-02-29
Payer: COMMERCIAL

## 2024-02-29 DIAGNOSIS — Z79.899 HIGH RISK MEDICATION USE: ICD-10-CM

## 2024-02-29 DIAGNOSIS — M05.9 SEROPOSITIVE RHEUMATOID ARTHRITIS (H): ICD-10-CM

## 2024-02-29 LAB
ALBUMIN SERPL BCG-MCNC: 4.8 G/DL (ref 3.5–5.2)
ALT SERPL W P-5'-P-CCNC: 53 U/L (ref 0–70)
AST SERPL W P-5'-P-CCNC: 31 U/L (ref 0–45)
CREAT SERPL-MCNC: 1.04 MG/DL (ref 0.67–1.17)
CRP SERPL-MCNC: <3 MG/L
EGFRCR SERPLBLD CKD-EPI 2021: 84 ML/MIN/1.73M2
ERYTHROCYTE [DISTWIDTH] IN BLOOD BY AUTOMATED COUNT: 12.1 % (ref 10–15)
ERYTHROCYTE [SEDIMENTATION RATE] IN BLOOD BY WESTERGREN METHOD: 48 MM/HR (ref 0–20)
HCT VFR BLD AUTO: 43.7 % (ref 40–53)
HGB BLD-MCNC: 14.8 G/DL (ref 13.3–17.7)
MCH RBC QN AUTO: 30.7 PG (ref 26.5–33)
MCHC RBC AUTO-ENTMCNC: 33.9 G/DL (ref 31.5–36.5)
MCV RBC AUTO: 91 FL (ref 78–100)
PLATELET # BLD AUTO: 165 10E3/UL (ref 150–450)
RBC # BLD AUTO: 4.82 10E6/UL (ref 4.4–5.9)
WBC # BLD AUTO: 4.4 10E3/UL (ref 4–11)

## 2024-02-29 PROCEDURE — 84460 ALANINE AMINO (ALT) (SGPT): CPT

## 2024-02-29 PROCEDURE — 84450 TRANSFERASE (AST) (SGOT): CPT

## 2024-02-29 PROCEDURE — 82565 ASSAY OF CREATININE: CPT

## 2024-02-29 PROCEDURE — 85652 RBC SED RATE AUTOMATED: CPT

## 2024-02-29 PROCEDURE — 85027 COMPLETE CBC AUTOMATED: CPT

## 2024-02-29 PROCEDURE — 82040 ASSAY OF SERUM ALBUMIN: CPT

## 2024-02-29 PROCEDURE — 36415 COLL VENOUS BLD VENIPUNCTURE: CPT

## 2024-02-29 PROCEDURE — 86140 C-REACTIVE PROTEIN: CPT

## 2024-02-29 RX ORDER — TESTOSTERONE CYPIONATE 1000 MG/10ML
100 INJECTION, SOLUTION INTRAMUSCULAR WEEKLY
Qty: 4 ML | Refills: 5 | Status: SHIPPED | OUTPATIENT
Start: 2024-02-29

## 2024-03-04 ENCOUNTER — MYC REFILL (OUTPATIENT)
Dept: FAMILY MEDICINE | Facility: CLINIC | Age: 54
End: 2024-03-04
Payer: COMMERCIAL

## 2024-03-04 DIAGNOSIS — N40.0 BENIGN PROSTATIC HYPERPLASIA, UNSPECIFIED WHETHER LOWER URINARY TRACT SYMPTOMS PRESENT: Primary | ICD-10-CM

## 2024-03-04 DIAGNOSIS — A60.00 GENITAL HERPES SIMPLEX, UNSPECIFIED SITE: ICD-10-CM

## 2024-03-04 DIAGNOSIS — G89.29 CHRONIC BILATERAL LOW BACK PAIN WITHOUT SCIATICA: ICD-10-CM

## 2024-03-04 DIAGNOSIS — G47.01 INSOMNIA DUE TO MEDICAL CONDITION: ICD-10-CM

## 2024-03-04 DIAGNOSIS — M54.50 CHRONIC BILATERAL LOW BACK PAIN WITHOUT SCIATICA: ICD-10-CM

## 2024-03-04 RX ORDER — TAMSULOSIN HYDROCHLORIDE 0.4 MG/1
0.4 CAPSULE ORAL DAILY
Qty: 30 CAPSULE | Refills: 0 | Status: SHIPPED | OUTPATIENT
Start: 2024-03-04 | End: 2024-03-27

## 2024-03-04 RX ORDER — TAMSULOSIN HYDROCHLORIDE 0.4 MG/1
0.4 CAPSULE ORAL DAILY
Qty: 90 CAPSULE | Refills: 3 | OUTPATIENT
Start: 2024-03-04

## 2024-03-04 RX ORDER — AMITRIPTYLINE HYDROCHLORIDE 10 MG/1
10 TABLET ORAL AT BEDTIME
Qty: 90 TABLET | Refills: 3 | Status: SHIPPED | OUTPATIENT
Start: 2024-03-04

## 2024-03-04 RX ORDER — VALACYCLOVIR HYDROCHLORIDE 1 G/1
1000 TABLET, FILM COATED ORAL DAILY
Qty: 5 TABLET | Refills: 5 | Status: SHIPPED | OUTPATIENT
Start: 2024-03-04

## 2024-03-06 ENCOUNTER — PHARMACY VISIT (OUTPATIENT)
Dept: ADMINISTRATIVE | Facility: CLINIC | Age: 54
End: 2024-03-06
Payer: COMMERCIAL

## 2024-03-06 NOTE — PROGRESS NOTES
Rheumatoid Arthritis Clinical Follow Up Assessment   Summary Notes     Patient was unreachable for Therapy Management pharmacists at [facility] after multiple attempts.  MTM pharmacist to follow up with patient moving forward.  Initial medication delivery date: 10/19/23   Refill date(s): 2/29/24.  Start date: unknown  Expected MTM follow up date: Early May 2024    Catherine Rogers PharmD  Therapy Management Pharmacist  [facility]  [email or web address] [email or web address]  Specialty: [phone]

## 2024-03-18 ENCOUNTER — TELEPHONE (OUTPATIENT)
Dept: RHEUMATOLOGY | Facility: CLINIC | Age: 54
End: 2024-03-18
Payer: COMMERCIAL

## 2024-03-18 NOTE — TELEPHONE ENCOUNTER
PA Initiation    Medication: HUMIRA *CF* PEN 40 MG/0.4ML SC PNKT  Insurance Company: [employer] - Phone [phone] Fax [phone]  Pharmacy Filling the Rx: [facility] - [place], [place] - [address]   Filling Pharmacy Phone: [phone]  Filling Pharmacy Fax: [phone]  Start Date: 3/18/2024  SILVANO (Key: [id number])    [email or web address]    Patient took first dose of Humira on 02/08/2024 - medical records attached. Previous therapies: methotrexate (stopped due to multiple bouts of diverticulitis and diarrhea)        Thank You,     Vj Rosen [facility]  Specialty Pharmacy Clinic [facility] Special[facility]  [email or web address]  [email or web address]  Phone: [phone]  Fax: [phone]

## 2024-03-18 NOTE — LETTER
[facility]  - Attn: Appeals and Grievances  Patient: Robson Galeano  ID#: [id number]   From the office of Mary Sargent PA-C       To whom it may concern,    I am writing this letter of medical necessity in regards to the recent denial of Humira. Robson Galeano is a pleasant male who unfortunately suffers from seropositive rheumatoid arthritis.     Our office has been notified that our request for continuation of therapy with Humira for the treatment of rheumatoid arthritis has been denied because Robson has not demonstrated significant improvement in his condition. Robson took his first dose of Humira on 02/28/2024 during a virtual visit with an [facility] pharmacist (medical records attached). Since Robson started Humira less than one month ago it is too soon at this time to determine the patient's clinical response to the requested medication.      We feel that Humira is the best choice of therapy for Robson Galeano. We would like to pursue this course of therapy and are requesting that you approve our decision to continue to provide Humira to our patient, allowing us to provide the best treatment option available, and allowing our patient time to determine his clinical response to the requested medication. We thank you for your immediate attention to this issue and we would appreciate haste in your determination.                                                                                         On behalf of Mary Sargent PA-C    Sincerely,    Vj Rosen  Specialty Pharmacy Clinic [facility] Specialty  [email or web address]  [email or web address]  Phone: [phone]  Fax: [phone]

## 2024-03-19 NOTE — TELEPHONE ENCOUNTER
Additional information requested:        Faxed response via RF         Thank You,     Karyna Rosen CPhT  Specialty Pharmacy Clinic [facility] Specialty  [email or web address]  [email or web address]  Phone: [phone]  Fax: [phone]

## 2024-03-20 SDOH — HEALTH STABILITY: PHYSICAL HEALTH: ON AVERAGE, HOW MANY MINUTES DO YOU ENGAGE IN EXERCISE AT THIS LEVEL?: 10 MIN

## 2024-03-20 SDOH — HEALTH STABILITY: PHYSICAL HEALTH: ON AVERAGE, HOW MANY DAYS PER WEEK DO YOU ENGAGE IN MODERATE TO STRENUOUS EXERCISE (LIKE A BRISK WALK)?: 3 DAYS

## 2024-03-20 ASSESSMENT — SOCIAL DETERMINANTS OF HEALTH (SDOH): HOW OFTEN DO YOU GET TOGETHER WITH FRIENDS OR RELATIVES?: TWICE A WEEK

## 2024-03-20 NOTE — TELEPHONE ENCOUNTER
PRIOR AUTHORIZATION DENIED    Medication: HUMIRA *CF* PEN 40 MG/0.4ML SC PNKT  Insurance Company: [employer] - Phone [phone] Fax [phone]  Denial Date: 3/20/2024  Denial Reason(s): Member has yet to demonstrate significant improvement in their condition        Appeal Information:   Appeals and Grievances FAX: [phone]   [facility] phone: [phone]              Patient Notified: appeal    Thank You,     Karyna Rosen [facility]  Specialty Pharmacy Clinic [facility] Specialty  [email or web address]  [email or web address]  Phone: [phone]  Fax: [phone]

## 2024-03-21 ENCOUNTER — VIRTUAL VISIT (OUTPATIENT)
Dept: PALLIATIVE MEDICINE | Facility: OTHER | Age: 54
End: 2024-03-21
Attending: PHYSICIAN ASSISTANT
Payer: COMMERCIAL

## 2024-03-21 DIAGNOSIS — M05.9 SEROPOSITIVE RHEUMATOID ARTHRITIS (H): Primary | ICD-10-CM

## 2024-03-21 NOTE — PROGRESS NOTES
Medication Therapy Management (MTM) Encounter    ASSESSMENT:                            Medication Adherence/Access: No issues identified    Seropositive rheumatoid arthritis: Per package insert for Humira edema is a possible side effect with a <5% incidence. Recommend switching to another TNF inhibitor like Enbrel to see if that helps resolve side effect. Discussed with patient and recommended they be seen by their rheumatologist and primary care provider for evaluation to rule out other causes of weight gain and water retention prior to switching therapy.    PLAN:                            Continue current medication regimen  Schedule appointment with Mary Sargent    Follow-up: Robson will contact you after you see Mary to schedule follow up appointment    SUBJECTIVE/OBJECTIVE:                          Robson Galeano is a 54 year old male called for a follow-up visit from 2/08/2024. Patient was accompanied by his wife Diamond. He was referred to me from Mary Sargent PA-C.       Reason for visit: Rheumatoid arthritis follow up.    Allergies/ADRs: Reviewed in chart  Past Medical History: Reviewed in chart  Tobacco: He reports that he quit smoking about 22 years ago. His smoking use included cigarettes. He smoked an average of 0.5 packs per day. He has never used smokeless tobacco.  Alcohol: 1-3 beverages / week  Other Substance Use: cannabis gummies, flower and balm    Medication Adherence/Access: no issues reported    Seropositive Rheumatoid Arthritis:   Humira 40 mg subcutaneously every 14 days on Thursdays    Patient administered 4th dose while on phone with me and reports no injection site reaction or troubles with administration. He states he is feeling swollen and puffy all over, especially in his stomach. States he has gained about 10 lbs since starting Humira and has had to go up in pants size. Reports no changes in diet other than increase in fiber to help with stools due to his history of  diverticulitis. States he isn't taking any fiber supplements and instead eating breakfast cereal with more fiber and increasing vegetable intake. States his stomach is hard when he touches it and the symptoms he's feeling are different than when he had diverticulitis. His hands are tight, he can't make a fist and there is swelling to the point where he has a hard time putting on his wedding ring. 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    Previous therapies: methotrexate (stopped due to multiple bouts of diverticulitis and diarrhea)    CBC RESULTS:   Recent Labs   Lab Test 02/29/24 0926   WBC 4.4   RBC 4.82   HGB 14.8   HCT 43.7   MCV 91   MCH 30.7   MCHC 33.9   RDW 12.1        Last Comprehensive Metabolic Panel:  Lab Results   Component Value Date     01/08/2024    POTASSIUM 3.9 01/08/2024    CHLORIDE 109 (H) 01/08/2024    CO2 24 01/08/2024    ANIONGAP 11 01/08/2024     (H) 01/08/2024    BUN 13.5 01/08/2024    CR 1.04 02/29/2024    GFRESTIMATED 84 02/29/2024    JEF 9.2 01/08/2024     Liver Function Studies -   Recent Labs   Lab Test 02/29/24  0926 01/30/24  0751 01/08/24  0921   PROTTOTAL  --   --  7.0   ALBUMIN 4.8   < > 4.6   BILITOTAL  --   --  0.8   ALKPHOS  --   --  71   AST 31   < > 18   ALT 53   < > 39    < > = values in this interval not displayed.     Today's Vitals: There were no vitals taken for this visit.  ----------------    I spent 21 minutes with this patient today. All changes were made via collaborative practice agreement with Mary Sargent. A copy of the visit note was provided to the patient's provider(s).    A summary of these recommendations was sent via [employer].    Robson Stevens, PharmD  Medication Therapy Management Pharmacist  [facility]  Phone: [phone]    Telemedicine Visit Details  Type of  service:  Telephone visit  Start Time:  8:29 AM  End Time:  8:50 AM     Medication Therapy Recommendations  Hypogonadism male    Current Medication: testosterone cypionate (DEPOTESTOSTERONE) 200 MG/ML injection (Discontinued)   Rationale: Patient prefers not to take - Adherence - Adherence   Recommendation: Change Medication - Xyosted 100 MG/0.5ML Soaj   Status: Accepted per Provider         Seropositive rheumatoid arthritis (H)    Current Medication: adalimumab (HUMIRA *CF*) 40 MG/0.4ML pen kit   Rationale: Undesirable effect - Adverse medication event - Safety   Recommendation: Change Medication - Enbrel SureClick 50 MG/ML Soaj   Status: Contact Provider - Awaiting Response

## 2024-03-21 NOTE — PATIENT INSTRUCTIONS
"Recommendations from today's MTM visit:                                                       Continue current medication regimen  Schedule appointment with Mary Sargent    Follow-up: Robson will contact you after you see Mary to schedule follow up appointment    It was great speaking with you today.  I value your experience and would be very thankful for your time in providing feedback in our clinic survey. In the next few days, you may receive an email or text message from [facility] [employer] with a link to a survey related to your  clinical pharmacist.\"     To schedule another MTM appointment, please call the clinic directly or you may call the MTM scheduling line at [phone] or toll-free at [phone].     My Clinical Pharmacist's contact information:                                                      Please feel free to contact me with any questions or concerns you have.      Robson Stevens, PharmD  Medication Therapy Management Pharmacist  [facility]  Phone: [phone]   "

## 2024-03-21 NOTE — Clinical Note
Patient is experiencing weight gain of about 10 lbs, feeling bloated, says he is puffy all over, and has had to go up in his pants size as a result. He says this seems to have started since starting the Humira. No other medication changes have occurred since he started. I asked him to schedule an appointment with you for evaluation and potential med change as it sounds like the Humira could be causing water retention. He is also seeing his PCP on 3/27 for evaluation. Edema is listed as a potential side effect in the package insert for Humira. Could consider trying Enbrel in place of Humira as it doesn't have edema listed as a potential side effect.

## 2024-03-25 NOTE — PROGRESS NOTES
"Rheumatology Clinic Visit  [facility]  ALVARADO Bunch     Robson Galeano MRN# [id number]   YOB: 1970 Age: 54 year old   Date of Visit: 3/26/2024  Primary care provider: Jace Campbell          Assessment and Plan:     1.  Seropositive rheumatoid arthritis  2.  High-risk medication use    Patient presents today for follow up for his seropositive rheumatoid arthritis.  Since starting the Humira he has noticed a 20 pound weight gain.  He states that he has had to get bigger close due to the weight gain.  He tolerates the Humira well otherwise.  Given this significant change since starting the Humira we will have him stop the Humira and we will try Enbrel.  Discussed the differences between Enbrel and Humira.  Also discussed with the patient that this is a weekly injection versus every other week.  Once the Enbrel gets approved he will start it when he is due for his next Humira.  Check labs 1 month after starting the new medication.  Follow-up with me in 3 months.      Plan:   Schedule follow-up with Mary Sargent PA-C in 3 months.   Labs: 1 month after starting Enbrel  Medication recommendations:  Stop Humira  When you're due for your Humira, that is when you will start Enbrel 50mg/ml SubQ WEEKLY    ALVARADO Bunch  Rheumatology         History of Present Illness:   Robson Galeano presents for evaluation of rheumatoid arthritis.      Rheumatological history:  Provider(s): Dr. Jackson, Dr. Calhoun  Last office visit: 04/19/2021  Pertinent lab history:  +CCP (251), Neg RF  Previous medications tried: NSAIDs, Methotrexate (diarrhea, infections), Humira (abdominal bloating, weight gain)  Current medications: Humira     Interval history March 26, 2024:  He states that he has had a lot of bloating. He feels that his abdomen is \"rock hard\". He has gained 20 pounds. He has changed his diet significantly as well and has been active. It seems to have started with the Humira. He is notice the " skin on his knuckles worsening as well. He is due next Wednesday for his Humira.    Interval history January 16, 2024:  He did get his vaccinations. He has been ill since then. He has had an URI and diverticulitis. He states that the congestion is getting better. He has had drainage from his eyes and ears. He has not taken any of his medications due to this. He will be getting a colonoscopy next week. The antibiotics have also caused him significant diarrhea.    His joints are bothering him. He is swollen. He has been taking Collagen.  He does not feel that he is in dire pain. He feels that his joints are stable.      Interval history October 17, 2023:  He gets diarrhea on the days that the takes his Methotrexate. He feels it is manageable. He has not taken any imodium. It does seem to relate to what he eats as well. He states that his hands have been swelling up. With working a lot the right hand/wrist gets much worse. He continues to have 1 hour of stiffness in the mornings.     PHI from consult of March 15, 2023:  Was diagnosed in 2019. At the time of diagnosis, his hands, elbows and shoulders were bothering him. He states previously getting injections. He does have back pain as well. He has been off of methotrexate for 5-6 months. He has tried medical cannabis, which helps. He was having relief with the methotrexate. He was taking it as a split dose. He has continued on the folic acid.     No infection. No personal history of cancer. No cardiac history, including high blood pressure high cholesterol. He drinks 4-5 beers/week.                Review of Systems:     Constitutional: negative  Skin: negative  Eyes: negative  Ears/Nose/Throat: negative  Respiratory: No shortness of breath, dyspnea on exertion, cough, or hemoptysis  Cardiovascular: negative  Gastrointestinal: negative  Genitourinary: negative  Musculoskeletal: as above  Neurologic: negative  Psychiatric: negative  Hematologic/Lymphatic/Immunologic:  negative  Endocrine: negative         Active Problem List:     Patient Active Problem List    Diagnosis Date Noted    Depression 2024     Priority: Medium    Essential hypertension 2024     Priority: Medium    RACHELL (obstructive sleep apnea) 2024     Priority: Medium    Polyarthritis 2024     Priority: Medium    Type 2 HSV infection of penis 2024     Priority: Medium    Genital herpes simplex, unspecified site 10/24/2023     Priority: Medium    Chronic bilateral low back pain without sciatica 2023     Priority: Medium    Benign prostatic hyperplasia, unspecified whether lower urinary tract symptoms present 2023     Priority: Medium    Hypogonadism male 2021     Priority: Medium    Non-recurrent unilateral inguinal hernia without obstruction or gangrene 2021     Priority: Medium    Seropositive rheumatoid arthritis (H) 2019     Priority: Medium    Low testosterone in male 2019     Priority: Medium    Ocular migraine 2012     Priority: Medium     Formatting of this note might be different from the original.  Rare-last 10-11      Morbid obesity (H) 2012     Priority: Medium     Formatting of this note might be different from the original.              Past Medical History:   History reviewed. No pertinent past medical history.  Past Surgical History:   Procedure Laterality Date    COLONOSCOPY N/A 2024    Procedure: COLONOSCOPY, FLEXIBLE, WITH LESION REMOVAL USING SNARE;  Surgeon: Mason Herrera MD;  Location: [facility]            Social History:     Social History     Socioeconomic History    Marital status:      Spouse name: Not on file    Number of children: Not on file    Years of education: Not on file    Highest education level: Not on file   Occupational History    Not on file   Tobacco Use    Smoking status: Former     Packs/day: .5     Types: Cigarettes     Quit date: 2002     Years since quittin.2    Smokeless  tobacco: Never   Vaping Use    Vaping Use: Never used   Substance and Sexual Activity    Alcohol use: Not Currently    Drug use: Yes     Types: Marijuana     Comment: has marijuana card    Sexual activity: Not on file   Other Topics Concern    Not on file   Social History Narrative    Not on file     Social Determinants of Health     Financial Resource Strain: Low Risk  (3/20/2024)    Financial Resource Strain     Within the past 12 months, have you or your family members you live with been unable to get utilities (heat, electricity) when it was really needed?: No   Food Insecurity: Low Risk  (3/20/2024)    Food Insecurity     Within the past 12 months, did you worry that your food would run out before you got money to buy more?: No     Within the past 12 months, did the food you bought just not last and you didn t have money to get more?: No   Transportation Needs: Low Risk  (3/20/2024)    Transportation Needs     Within the past 12 months, has lack of transportation kept you from medical appointments, getting your medicines, non-medical meetings or appointments, work, or from getting things that you need?: No   Physical Activity: Insufficiently Active (3/20/2024)    Exercise Vital Sign     Days of Exercise per Week: 3 days     Minutes of Exercise per Session: 10 min   Stress: Stress Concern Present (3/20/2024)    [employer] [place] [employer] - Occupational Stress Questionnaire     Feeling of Stress : To some extent   Social Connections: Unknown (3/20/2024)    Social Connection and Isolation Panel [NHANES]     Frequency of Communication with Friends and Family: Not on file     Frequency of Social Gatherings with Friends and Family: Twice a week     Attends [employer] Services: Not on file     Active Member of Clubs or Organizations: Not on file     Attends Club or Organization Meetings: Not on file     Marital Status: Not on file   Interpersonal Safety: Not on file   Housing Stability: Low Risk   "(3/20/2024)    Housing Stability     Do you have housing? : Yes     Are you worried about losing your housing?: No          Family History:   History reviewed. No pertinent family history.         Allergies:     Allergies   Allergen Reactions    Bee Venom Other (See Comments)     Hallucinates, no anaphylaxis  Hallucinates, no anaphylaxis  Hallucinates, no anaphylaxis              Medications:     Current Outpatient Medications   Medication Sig Dispense Refill    adalimumab (HUMIRA *CF*) 40 MG/0.4ML pen kit Inject 0.4 mLs (40 mg) Subcutaneous every 14 days Hold for signs of infection, then seek medical attention. 1 each 5    amitriptyline (ELAVIL) 10 MG tablet Take 1 tablet (10 mg) by mouth at bedtime 90 tablet 3    etanercept (ENBREL SURECLICK) 50 MG/ML autoinjector Inject 50 mg Subcutaneous every 7 days 4 mL 2    gabapentin (NEURONTIN) 300 MG capsule Take 1 capsule (300 mg) by mouth 2 times daily 180 capsule 3    Needle, Disp, (B-D HYPODERMIC NEEDLE) 18G X 1-1/2\" MISC 1 each once a week 12 each 3    Needle, Disp, (B-D HYPODERMIC NEEDLE) 22G X 1\" MISC 1 each once a week 12 each 3    Probiotic Product (FORTIFY DAILY PROBIOTIC PO)       Syringe Luer Slip (B-D SYRINGE LUER-MARIALUISA) 3 ML MISC 1 each once a week 12 each 3    tamsulosin (FLOMAX) 0.4 MG capsule Take 1 capsule (0.4 mg) by mouth daily 30 capsule 0    testosterone cypionate (DEPOTESTOSTERONE) 100 MG/ML injection Inject 1 mL (100 mg) into the muscle once a week 4 mL 5    valACYclovir (VALTREX) 1000 mg tablet Take 1 tablet (1,000 mg) by mouth daily 5 tablet 5    valACYclovir (VALTREX) 500 MG tablet Take 1 tablet (500 mg) by mouth daily 90 tablet 3            Physical Exam:   Blood pressure (!) 156/100, pulse 63, resp. rate 20, height 1.676 m (5' 6\"), weight 106.7 kg (235 lb 3.2 oz), SpO2 96%.  Wt Readings from Last 6 Encounters:   03/26/24 106.7 kg (235 lb 3.2 oz)   01/23/24 97.5 kg (215 lb)   01/15/24 97.5 kg (215 lb)   01/08/24 97.5 kg (215 lb)   11/14/23 97.5 kg " (215 lb)   10/17/23 99.7 kg (219 lb 14.4 oz)     Constitutional: well-developed, appearing stated age; cooperative  Eyes: nl conjunctiva, sclera  ENT: nl external ears, nose, hearing, lips,  Neck: no visible mass or thyroid enlargement  Resp: No shortness of breath with normal conversation  Skin: no nail pitting, alopecia, rash, nodules or lesions.   Psych: nl judgement, orientation, memory, affect.           Data:   Imaging:  Xray foot/feet 10/18/2019  FINDINGS:  Single image showing both feet. Bony mineralization within normal limits. Joint spaces appear maintained. No erosions are seen. No abnormal soft tissue calcifications.    Xray hands 10/18/2019  IMPRESSION: Degenerative/arthritic changes in both hands as described. No definite erosions or chondrocalcinosis.    Xray SI joints 10/18/2019  FINDINGS:  Bony structures and sacroiliac joints are unremarkable. The SI joints appear intact without evidence of bony ankylosis or erosive changes.    Laboratory:  3/2/2023  Creatinine 1.13, GFR 77  Albumin 4.6  ALT 47, AST 29  CBC within normal limits  Hepatitis C nonreactive    8/17/2023  Albumin 4.7  ALT 52, AST 30  CRP less than 3.0  CBC within normal limits  Sed rate 31  Creatinine 0.99, GFR 90    1/8/2024  Creatinine 0.94, GFR greater than 90  Albumin 4.6  ALT 39, AST 18  White blood cell count 9.0, hemoglobin 14.1, platelet count 170

## 2024-03-26 ENCOUNTER — OFFICE VISIT (OUTPATIENT)
Dept: RHEUMATOLOGY | Facility: CLINIC | Age: 54
End: 2024-03-26
Payer: COMMERCIAL

## 2024-03-26 ENCOUNTER — TELEPHONE (OUTPATIENT)
Dept: RHEUMATOLOGY | Facility: CLINIC | Age: 54
End: 2024-03-26

## 2024-03-26 VITALS
WEIGHT: 235.2 LBS | HEIGHT: 66 IN | HEART RATE: 63 BPM | OXYGEN SATURATION: 96 % | SYSTOLIC BLOOD PRESSURE: 156 MMHG | DIASTOLIC BLOOD PRESSURE: 100 MMHG | RESPIRATION RATE: 20 BRPM | BODY MASS INDEX: 37.8 KG/M2

## 2024-03-26 DIAGNOSIS — M05.9 SEROPOSITIVE RHEUMATOID ARTHRITIS (H): Primary | ICD-10-CM

## 2024-03-26 DIAGNOSIS — Z79.899 HIGH RISK MEDICATION USE: ICD-10-CM

## 2024-03-26 PROCEDURE — 99214 OFFICE O/P EST MOD 30 MIN: CPT | Performed by: PHYSICIAN ASSISTANT

## 2024-03-26 ASSESSMENT — PAIN SCALES - GENERAL: PAINLEVEL: MODERATE PAIN (4)

## 2024-03-26 NOTE — TELEPHONE ENCOUNTER
Rheumatology visit 03/26/2024:      Switched patient to Enbrel due to weight gain/bloating since starting Humira.

## 2024-03-26 NOTE — PATIENT INSTRUCTIONS
After Visit Instructions:     Thank you for coming to [facility] for your care. It is my goal to partner with you to help you reach your optimal state of health.     Plan:     Schedule follow-up with Mary Sargent PA-C in 3 months.   Labs: 1 month after starting Enbrel  Medication recommendations:  Stop Humira  When you're due for your Humira, that is when you will start Enbrel 50mg/ml SubQ WEEKLY      Mayr Sargent PA-C  [facility]  [facility]    Contact information: [facility]  Clinic Number:  [phone]  Please call or send a Radio[employer] message with any questions about your care

## 2024-03-26 NOTE — TELEPHONE ENCOUNTER
PA Initiation    Medication: ETANERCEPT 50 MG/ML SC SOAJ AUTOINJECTOR  Insurance Company: [employer] - Phone [phone] Fax [phone]  Pharmacy Filling the Rx: [facility] - [place] [address]  Filling Pharmacy Phone: [phone]  Filling Pharmacy Fax: [phone]  Start Date: 3/26/2024  SILVANO (Key: [id number])    [email or web address]        Thank You,     Vj Rosen [facility]  Specialty Pharmacy Clinic [facility] Specialty  [email or web address]  [email or web address]  Phone: [phone]  Fax: [phone]

## 2024-03-27 ENCOUNTER — VIRTUAL VISIT (OUTPATIENT)
Dept: FAMILY MEDICINE | Facility: CLINIC | Age: 54
End: 2024-03-27
Payer: COMMERCIAL

## 2024-03-27 DIAGNOSIS — N40.0 BENIGN PROSTATIC HYPERPLASIA, UNSPECIFIED WHETHER LOWER URINARY TRACT SYMPTOMS PRESENT: ICD-10-CM

## 2024-03-27 PROCEDURE — 99441 PR PHYSICIAN TELEPHONE EVALUATION 5-10 MIN: CPT | Mod: 93 | Performed by: PHYSICIAN ASSISTANT

## 2024-03-27 RX ORDER — TAMSULOSIN HYDROCHLORIDE 0.4 MG/1
0.4 CAPSULE ORAL DAILY
Qty: 30 CAPSULE | Refills: 0 | Status: SHIPPED | OUTPATIENT
Start: 2024-03-27 | End: 2024-03-27

## 2024-03-27 RX ORDER — TAMSULOSIN HYDROCHLORIDE 0.4 MG/1
0.4 CAPSULE ORAL DAILY
Qty: 90 CAPSULE | Refills: 3 | Status: SHIPPED | OUTPATIENT
Start: 2024-03-27

## 2024-03-27 NOTE — TELEPHONE ENCOUNTER
Prior Authorization Approval    Medication: ETANERCEPT 50 MG/ML SC SOAJ AUTOINJECTOR  Authorization Effective Date: 3/27/2024  Authorization Expiration Date: 3/26/2025  Approved Dose/Quantity: 4 / 28  Reference #: SILVANO (Key: [id number])   Insurance Company: [employer] - Phone [phone] Fax [phone]  Expected CoPay: $ 0  CoPay Card Available: Other (see comments)    Financial Assistance Needed: [email or web address]   Which Pharmacy is filling the prescription: [facility] [place] [place] [facility] [address] [facility]  Pharmacy Notified: yes - New Spec med  Patient Notified: yes - MyChart        Thank You,     Karyna Rosen [facility]  Specialty Pharmacy Clinic [facility] Specialty  [email or web address]  [email or web address]  Phone: [phone]  Fax: [phone]

## 2024-03-27 NOTE — PROGRESS NOTES
"Robson is a 54 year old who is being evaluated via a billable telephone visit.    What phone number would you like to be contacted at? [phone]  How would you like to obtain your AVS? Rehanharkyra  Originating Location (pt. Location): Home    Distant Location (provider location):  Off-site    Assessment & Plan   Benign prostatic hyperplasia, unspecified whether lower urinary tract symptoms present  Symptoms are stable. Refilled Flomax. Recheck PSA at next lab draw.   - tamsulosin (FLOMAX) 0.4 MG capsule; Take 1 capsule (0.4 mg) by mouth daily  - PSA, screen; Future    Counseling  Appropriate preventive services were discussed with this patient, including applicable screening as appropriate for fall prevention, nutrition, physical activity, Tobacco-use cessation, weight loss and cognition.  Checklist reviewing preventive services available has been given to the patient.  Reviewed patient's diet, addressing concerns and/or questions.   He is at risk for lack of exercise and has been provided with information to increase physical activity for the benefit of his well-being.     Subjective   Robson is a 54 year old, presenting for the following health issues:  Recheck Medication (Flomax)      3/27/2024    12:47 PM   Additional Questions   Roomed by Jennifer CASEY CMA     History of Present Illness       Reason for visit:  Check urinary meds    He eats 2-3 servings of fruits and vegetables daily.He consumes 1 sweetened beverage(s) daily.He exercises with enough effort to increase his heart rate 20 to 29 minutes per day.  He exercises with enough effort to increase his heart rate 3 or less days per week. He is missing 1 dose(s) of medications per week.  He is not taking prescribed medications regularly due to remembering to take.      ROS:  See HPI      Objective    Vitals - Patient Reported  Weight (Patient Reported): 106.6 kg (235 lb)  Height (Patient Reported): 167.6 cm (5' 6\")  BMI (Based on Pt Reported Ht/Wt): 37.93  Pain " Score: No Pain (0)        Physical Exam   General: Alert and no distress //Respiratory: No audible wheeze, cough, or shortness of breath // Psychiatric:  Appropriate affect, tone, and pace of words        Phone call duration: 5 minutes  Signed Electronically by: Jace Campbell PA-C

## 2024-04-01 NOTE — TELEPHONE ENCOUNTER
COPAY CARD OBTAINED    Medication: ETANERCEPT 50 MG/ML SC SOAJ AUTOINJECTOR  Sponsor: [employer] [facility]  Member ID: [id number]  BIN: [id number]  PCN: [id number]  Group: [id number]  Expected Copay: $ 0  Copay card Monthly Max Amount: $    Copay card Annual Amount: $          Thank You,     Karyna Rosen CPhT  Specialty Pharmacy Clinic [facility] Specialty  [email or web address]  [email or web address]  Phone: [phone]  Fax: [phone]

## 2024-04-03 ENCOUNTER — VIRTUAL VISIT (OUTPATIENT)
Dept: PALLIATIVE MEDICINE | Facility: OTHER | Age: 54
End: 2024-04-03
Attending: PHYSICIAN ASSISTANT
Payer: COMMERCIAL

## 2024-04-03 DIAGNOSIS — M05.9 SEROPOSITIVE RHEUMATOID ARTHRITIS (H): Primary | ICD-10-CM

## 2024-04-03 NOTE — PROGRESS NOTES
Medication Therapy Management (MTM) Encounter    ASSESSMENT:                            Medication Adherence/Access: Notified patient via [employer] message after visit how to check for eligibility for reimbursement program through .    Seropositive rheumatoid arthritis: Provided abbreviated education on Enbrel today including dosing, general administration, side effects (both common/serious), precautions, monitoring, time to efficacy, and differences/similarities to Humira. Would benefit from starting Enbrel once it arrives and using 50 mg once weekly as directed.     PLAN:                            Start Enbrel 50 mg once weekly injections once you receive them  Start on the same day you would administer your next Humira injection  Have labs checked 1 month after starting Enbrel    Follow-up: Robson will send you a [employer] message with website and phone number to contact to set up for reimbursement program through  of Enbrel.    SUBJECTIVE/OBJECTIVE:                          Robson Galeano is a 54 year old male called for a follow-up visit from 03/21/2024. Patient was accompanied by his wife, Diamond.      Reason for visit: Enbrel new start for rheumatoid arthritis.    Allergies/ADRs: Reviewed in chart  Past Medical History: Reviewed in chart  Tobacco: He reports that he quit smoking about 22 years ago. His smoking use included cigarettes. He smoked an average of 0.5 packs per day. He has never used smokeless tobacco.  Alcohol: 1-3 beverages / week  Other Substance Use: cannabis gummies, flower and balm    Medication Adherence/Access: Patient has high deductible plan with accumulator on it. Enbrel has a copay card that will reduce price to $5/month, but a max of $7500/year and patient isn't able to afford medication after copay card funds are depleted. After visit with patient I discussed options for patients with accumulator plans with pharmacy liaison and she said patient can see if they qualify  for reimbursement through  program.    Seropositive Rheumatoid Arthritis:   Enbrel 50 mg subcutaneously once weekly - has not started yet    Prior to stopping the Humira he had bad rashes, primarily on his wrists. States those have improved now that he is 2 weeks out from his last Humira injection on 3/21/24. He is noticing less swelling in his hands and can make a fist.    Previous therapies: methotrexate (stopped due to multiple bouts of diverticulitis and diarrhea), Humira (edema).    Reviewed baseline pre-biologic screening.   Hep C antibody non-reactive 10/17/2023  Hep B surface antibody not completed  Hep B surface antigen non-reactive 10/17/2023  Hep B core antibody non-reactive 10/17/2023  Quantiferon TB Negative 10/17/2023    CBC RESULTS:   Recent Labs   Lab Test 02/29/24  0926   WBC 4.4   RBC 4.82   HGB 14.8   HCT 43.7   MCV 91   MCH 30.7   MCHC 33.9   RDW 12.1        Last Comprehensive Metabolic Panel:  Lab Results   Component Value Date     01/08/2024    POTASSIUM 3.9 01/08/2024    CHLORIDE 109 (H) 01/08/2024    CO2 24 01/08/2024    ANIONGAP 11 01/08/2024     (H) 01/08/2024    BUN 13.5 01/08/2024    CR 1.04 02/29/2024    GFRESTIMATED 84 02/29/2024    JEF 9.2 01/08/2024     Liver Function Studies -   Recent Labs   Lab Test 02/29/24  0926 01/30/24  0751 01/08/24  0921   PROTTOTAL  --   --  7.0   ALBUMIN 4.8   < > 4.6   BILITOTAL  --   --  0.8   ALKPHOS  --   --  71   AST 31   < > 18   ALT 53   < > 39    < > = values in this interval not displayed.     Today's Vitals: There were no vitals taken for this visit.  ----------------    I spent 26 minutes with this patient today. All changes were made via collaborative practice agreement with Mary Sargent. A copy of the visit note was provided to the patient's provider(s).    A summary of these recommendations was sent via Tern.    Robson Stevens, PharmD  Medication Therapy Management Pharmacist  [facility]  Rheumatology Clinic  Phone: [phone]    Telemedicine Visit Details  Type of service:  Telephone visit  Start Time:  8:30 AM  End Time: 8:56 AM     Medication Therapy Recommendations  Seropositive rheumatoid arthritis (H)    Current Medication: adalimumab (HUMIRA *CF*) 40 MG/0.4ML pen kit (Discontinued)   Rationale: Undesirable effect - Adverse medication event - Safety   Recommendation: Change Medication - Enbrel SureClick 50 MG/ML Soaj   Status: Accepted per Provider          Current Medication: etanercept (ENBREL SURECLICK) 50 MG/ML autoinjector   Rationale: Does not understand instructions - Adherence - Adherence   Recommendation: Provide Education   Status: Patient Agreed - Adherence/Education   Note: Enbrel new start education provided          Current Medication: etanercept (ENBREL SURECLICK) 50 MG/ML autoinjector   Rationale: Cannot afford medication product - Cost - Adherence   Recommendation: Referral to Service    Status: Patient Agreed - Adherence/Education   Note: Directed patient to [employer] program to help pay for medication

## 2024-04-03 NOTE — Clinical Note
We discussed Enbrel and are now working on getting it to patient at a price they can afford. They have an accumulator plan on their insurance so any money paid by a copay card doesn't go towards their deductible or out of pocket max. I'm working with them to see if they can pay for it up front and submit receipts for reimbursement as a way to have those dollars counted. If they doesn't work out for them we'll [employer] back and see what other options are available.  Thanks, Robson

## 2024-04-03 NOTE — PATIENT INSTRUCTIONS
"Recommendations from today's MTM visit:                                                       Start Enbrel 50 mg once weekly injections once you receive them  Start on the same day you would administer your next Humira injection  Have labs checked 1 month after starting Enbrel    Follow-up: Robson will send you a Socket Mobile message with website and phone number to contact to set up for reimbursement program through  of Enbrel.    It was great speaking with you today.  I value your experience and would be very thankful for your time in providing feedback in our clinic survey. In the next few days, you may receive an email or text message from [employer] with a link to a survey related to your  clinical pharmacist.\"     To schedule another MTM appointment, please call the clinic directly or you may call the MTM scheduling line at [phone] or toll-free at [phone].     My Clinical Pharmacist's contact information:                                                      Please feel free to contact me with any questions or concerns you have.      Robson Stevens, PharmD  Medication Therapy Management Pharmacist  [facility] Rheumatology Clinic  Phone: [phone]   "

## 2024-04-08 ENCOUNTER — TELEPHONE (OUTPATIENT)
Dept: RHEUMATOLOGY | Facility: CLINIC | Age: 54
End: 2024-04-08
Payer: COMMERCIAL

## 2024-04-08 DIAGNOSIS — M05.9 SEROPOSITIVE RHEUMATOID ARTHRITIS (H): Primary | ICD-10-CM

## 2024-04-08 NOTE — TELEPHONE ENCOUNTER
Patient's Niece calling stating Robson needs a referral to go to the [facility] to be seen . Wants Mary to send a referral to the [facility] so he can make an appointment. Please call if there is any questions, [phone]

## 2024-04-08 NOTE — TELEPHONE ENCOUNTER
Per Patient=  Pt Nislim works at [facility] and has made him appt there  with a Rheumatologist.  Understands that the refer for [facility] is simply forms to convey what has alsready been tested offerred and records and then they decide if anything further to offer.    Advised if Mary refers and we do not already have the forms refer packet that is needed; we often get them from the patient.  He said his Niece would know what forms or if a simple letter at this time would suffice.    Diamond LIN   [facility] RN

## 2024-04-09 ENCOUNTER — MYC MEDICAL ADVICE (OUTPATIENT)
Dept: FAMILY MEDICINE | Facility: CLINIC | Age: 54
End: 2024-04-09
Payer: COMMERCIAL

## 2024-04-09 NOTE — TELEPHONE ENCOUNTER
Pls see [employer] message below.     Date of last VV with Giovanny DOW: 3/27/24, OV 4/7/23.    Referral pended, message routed to Giov[facility] DOW for consideration.     Julie Behrendt RN

## 2024-04-10 ENCOUNTER — E-VISIT (OUTPATIENT)
Dept: FAMILY MEDICINE | Facility: CLINIC | Age: 54
End: 2024-04-10
Payer: COMMERCIAL

## 2024-04-10 DIAGNOSIS — M54.50 CHRONIC BILATERAL LOW BACK PAIN WITHOUT SCIATICA: Primary | ICD-10-CM

## 2024-04-10 DIAGNOSIS — G89.29 CHRONIC BILATERAL LOW BACK PAIN WITHOUT SCIATICA: Primary | ICD-10-CM

## 2024-04-10 PROCEDURE — 99207 PR NON-BILLABLE SERV PER CHARTING: CPT | Performed by: PHYSICIAN ASSISTANT

## 2024-04-10 NOTE — TELEPHONE ENCOUNTER
Unfortunately, I have not seen Robson for his back in over a year. I would need an appointment virtual or in person to determine his need for an MRI.     Jace Campbell PA-C

## 2024-04-12 ENCOUNTER — VIRTUAL VISIT (OUTPATIENT)
Dept: FAMILY MEDICINE | Facility: CLINIC | Age: 54
End: 2024-04-12
Payer: COMMERCIAL

## 2024-04-12 DIAGNOSIS — D49.2: Primary | ICD-10-CM

## 2024-04-12 DIAGNOSIS — G89.29 CHRONIC BILATERAL LOW BACK PAIN WITHOUT SCIATICA: ICD-10-CM

## 2024-04-12 DIAGNOSIS — M54.50 CHRONIC BILATERAL LOW BACK PAIN WITHOUT SCIATICA: ICD-10-CM

## 2024-04-12 PROCEDURE — 99214 OFFICE O/P EST MOD 30 MIN: CPT | Mod: 95 | Performed by: PHYSICIAN ASSISTANT

## 2024-04-12 NOTE — PROGRESS NOTES
"Robson is a 54 year old who is being evaluated via a billable video visit.    How would you like to obtain your AVS? MyChart  If the video visit is dropped, the invitation should be resent by: Text to cell phone: [phone]  Will anyone else be joining your video visit? No      Assessment & Plan     Neoplasm of sacral spinal nerve  Chronic bilateral low back pain without sciatica  CT scan several months ago demonstrated a possible sacral nerve cyst versus tumor.  Needs follow-up imaging.  MRI ordered for further evaluation.  Also needs recertification of medical marijuana and the patient still meets criteria for this.  - MR Lumbar Spine w/o & w Contrast; Future    BMI  Estimated body mass index is 37.96 kg/m  as calculated from the following:    Height as of 3/26/24: 1.676 m (5' 6\").    Weight as of 3/26/24: 106.7 kg (235 lb 3.2 oz).     Subjective   Robson is a 54 year old, presenting for the following health issues:  Back Pain        4/12/2024     8:02 AM   Additional Questions   Roomed by Danae CLINTON CMA     History of Present Illness       Back Pain:  He presents for follow up of back pain. Patient's back pain is a chronic problem.  Location of back pain:  Right lower back, left lower back, right side of waist and left side of waist  Description of back pain: gnawing, sharp, shooting and stabbing  Back pain spreads: right buttocks, left buttocks, right thigh and left thigh    Since patient first noticed back pain, pain is: gradually worsening  Does back pain interfere with his job:  Yes       He eats 2-3 servings of fruits and vegetables daily.He consumes 0 sweetened beverage(s) daily.He exercises with enough effort to increase his heart rate 10 to 19 minutes per day.  He exercises with enough effort to increase his heart rate 5 days per week.   He is taking medications regularly.    -Patient would also like his medical marijuana card renewed.     -Patient states he had a lesion on his pelvis that was noted on his " last CT scan and he would like to know if this needs to be addressed further.     CT ABDOMEN/PELVIS WITH CONTRAST January 8, 2024 9:57 AM     CLINICAL HISTORY: Left lower quadrant pain, two month history of  recurring diverticulitis.     TECHNIQUE: CT scan of the abdomen and pelvis was performed following  injection of IV contrast. Multiplanar reformats were obtained. Dose  reduction techniques were used.  CONTRAST: 100 Isovue-370     COMPARISON: CT 11/14/2023.     FINDINGS:   LOWER CHEST: Bibasilar atelectasis.     HEPATOBILIARY: Hepatic steatosis. No acute abnormality. Gallbladder  unremarkable. Tiny hepatic cyst image 35.     PANCREAS: Normal.     SPLEEN: Normal.     ADRENAL GLANDS: Normal.     KIDNEYS/BLADDER: No significant mass, stones, or hydronephrosis. There  are simple or benign cysts. No follow up is needed.     BOWEL: Acute diverticulitis at the descending/sigmoid colon level at  the left lower quadrant again identified. This appears progressed  since 11/14/2023. There is no abscess or free air. No bowel  obstruction. Normal appendix.     PELVIC ORGANS: Trace pelvic fluid. Stable low-density presumed cystic  lesion at the left posterior pelvis measuring 21 x 17 mm series 5  image 164. This approximates the course of the left third sacral nerve  root.     ADDITIONAL FINDINGS: None.     MUSCULOSKELETAL: Degenerative changes of the spine.                                                                    IMPRESSION:   1.  Acute diverticulitis again demonstrated at the descending/sigmoid  colon junction of the left lower quadrant. This appears progressed as  compared to 11/14/2023. No abscess identified. No free air at this  time. Trace pelvic fluid.  2.  No other acute abnormality.  3.  Stable nodular hypodense lesion along the course of the left-sided  sacral nerve root. This could be a perineural cyst versus tumor that  is nonspecific.  4.  Fatty liver.     Review of Systems  See HPI       Objective            Vitals:  No vitals were obtained today due to virtual visit.    Physical Exam   GENERAL: alert and no distress  EYES: Eyes grossly normal to inspection.  No discharge or erythema, or obvious scleral/conjunctival abnormalities.  RESP: No audible wheeze, cough, or visible cyanosis.    SKIN: Visible skin clear. No significant rash, abnormal pigmentation or lesions.  NEURO: Cranial nerves grossly intact.  Mentation and speech appropriate for age.  PSYCH: Appropriate affect, tone, and pace of words      Video-Visit Details    Type of service:  Video Visit   Originating Location (pt. Location): Home    Distant Location (provider location):  On-site  Platform used for Video Visit: Claribel  Signed Electronically by: Jace Campbell PA-C

## 2024-04-16 ENCOUNTER — TELEPHONE (OUTPATIENT)
Dept: RHEUMATOLOGY | Facility: CLINIC | Age: 54
End: 2024-04-16
Payer: COMMERCIAL

## 2024-04-16 NOTE — TELEPHONE ENCOUNTER
Pt's niece calling to say that [facility] has not received the referral yet. I did explain to niece that the referral was sent on 4/8/24. Pt's niece is going to call [facility] again.     [place]in [place]   [facility] [place]   [facility]  [phone]

## 2024-04-23 ENCOUNTER — HOSPITAL ENCOUNTER (OUTPATIENT)
Dept: MRI IMAGING | Facility: CLINIC | Age: 54
Discharge: HOME | End: 2024-04-23
Attending: PHYSICIAN ASSISTANT | Admitting: PHYSICIAN ASSISTANT
Payer: COMMERCIAL

## 2024-04-23 DIAGNOSIS — E29.1 HYPOGONADISM MALE: Primary | ICD-10-CM

## 2024-04-23 DIAGNOSIS — G89.29 CHRONIC BILATERAL LOW BACK PAIN WITHOUT SCIATICA: ICD-10-CM

## 2024-04-23 DIAGNOSIS — D49.2: ICD-10-CM

## 2024-04-23 DIAGNOSIS — M54.50 CHRONIC BILATERAL LOW BACK PAIN WITHOUT SCIATICA: ICD-10-CM

## 2024-04-23 PROCEDURE — A9585 GADOBUTROL INJECTION: HCPCS | Performed by: PHYSICIAN ASSISTANT

## 2024-04-23 PROCEDURE — 255N000002 HC RX 255 OP 636: Performed by: PHYSICIAN ASSISTANT

## 2024-04-23 PROCEDURE — 72158 MRI LUMBAR SPINE W/O & W/DYE: CPT

## 2024-04-23 RX ORDER — NEEDLES, FILTER 19GX1 1/2"
NEEDLE, DISPOSABLE MISCELLANEOUS
Qty: 12 EACH | Refills: 3 | Status: SHIPPED | OUTPATIENT
Start: 2024-04-23

## 2024-04-23 RX ORDER — GADOBUTROL 604.72 MG/ML
10 INJECTION INTRAVENOUS ONCE
Status: COMPLETED | OUTPATIENT
Start: 2024-04-23 | End: 2024-04-23

## 2024-04-23 RX ORDER — SYRINGE WITH NEEDLE, 1 ML 25GX5/8"
SYRINGE, EMPTY DISPOSABLE MISCELLANEOUS
Refills: 3 | Status: CANCELLED | OUTPATIENT
Start: 2024-04-23 | End: 2024-07-15

## 2024-04-23 RX ADMIN — GADOBUTROL 10 ML: 604.72 INJECTION INTRAVENOUS at 20:34

## 2024-05-07 ENCOUNTER — LAB (OUTPATIENT)
Dept: LAB | Facility: CLINIC | Age: 54
End: 2024-05-07
Payer: COMMERCIAL

## 2024-05-07 DIAGNOSIS — Z79.899 HIGH RISK MEDICATIONS (NOT ANTICOAGULANTS) LONG-TERM USE: Primary | ICD-10-CM

## 2024-05-07 DIAGNOSIS — N40.0 BENIGN PROSTATIC HYPERPLASIA, UNSPECIFIED WHETHER LOWER URINARY TRACT SYMPTOMS PRESENT: ICD-10-CM

## 2024-05-07 LAB
ALT SERPL W P-5'-P-CCNC: 51 U/L (ref 0–70)
AST SERPL W P-5'-P-CCNC: 29 U/L (ref 0–45)
BASOPHILS # BLD AUTO: 0 10E3/UL (ref 0–0.2)
BASOPHILS NFR BLD AUTO: 0 %
CREAT SERPL-MCNC: 1.09 MG/DL (ref 0.67–1.17)
EGFRCR SERPLBLD CKD-EPI 2021: 80 ML/MIN/1.73M2
EOSINOPHIL # BLD AUTO: 0.2 10E3/UL (ref 0–0.7)
EOSINOPHIL NFR BLD AUTO: 3 %
ERYTHROCYTE [DISTWIDTH] IN BLOOD BY AUTOMATED COUNT: 12.8 % (ref 10–15)
HCT VFR BLD AUTO: 44 % (ref 40–53)
HGB BLD-MCNC: 15 G/DL (ref 13.3–17.7)
IMM GRANULOCYTES # BLD: 0 10E3/UL
IMM GRANULOCYTES NFR BLD: 0 %
LYMPHOCYTES # BLD AUTO: 1.8 10E3/UL (ref 0.8–5.3)
LYMPHOCYTES NFR BLD AUTO: 26 %
MCH RBC QN AUTO: 30.5 PG (ref 26.5–33)
MCHC RBC AUTO-ENTMCNC: 34.1 G/DL (ref 31.5–36.5)
MCV RBC AUTO: 90 FL (ref 78–100)
MONOCYTES # BLD AUTO: 0.6 10E3/UL (ref 0–1.3)
MONOCYTES NFR BLD AUTO: 8 %
NEUTROPHILS # BLD AUTO: 4.2 10E3/UL (ref 1.6–8.3)
NEUTROPHILS NFR BLD AUTO: 62 %
PLATELET # BLD AUTO: 177 10E3/UL (ref 150–450)
PSA SERPL DL<=0.01 NG/ML-MCNC: 1.14 NG/ML (ref 0–3.5)
RBC # BLD AUTO: 4.91 10E6/UL (ref 4.4–5.9)
WBC # BLD AUTO: 6.8 10E3/UL (ref 4–11)

## 2024-05-07 PROCEDURE — 82565 ASSAY OF CREATININE: CPT

## 2024-05-07 PROCEDURE — G0103 PSA SCREENING: HCPCS

## 2024-05-07 PROCEDURE — 84460 ALANINE AMINO (ALT) (SGPT): CPT

## 2024-05-07 PROCEDURE — 85025 COMPLETE CBC W/AUTO DIFF WBC: CPT

## 2024-05-07 PROCEDURE — 84450 TRANSFERASE (AST) (SGOT): CPT

## 2024-05-07 PROCEDURE — 36415 COLL VENOUS BLD VENIPUNCTURE: CPT

## 2024-05-09 ENCOUNTER — HOSPITAL ENCOUNTER (OUTPATIENT)
Dept: MRI IMAGING | Facility: CLINIC | Age: 54
Discharge: HOME | End: 2024-05-09
Attending: PHYSICIAN ASSISTANT | Admitting: PHYSICIAN ASSISTANT
Payer: COMMERCIAL

## 2024-05-09 DIAGNOSIS — M54.50 CHRONIC BILATERAL LOW BACK PAIN WITHOUT SCIATICA: ICD-10-CM

## 2024-05-09 DIAGNOSIS — D49.2: ICD-10-CM

## 2024-05-09 DIAGNOSIS — G89.29 CHRONIC BILATERAL LOW BACK PAIN WITHOUT SCIATICA: ICD-10-CM

## 2024-05-09 PROCEDURE — 72197 MRI PELVIS W/O & W/DYE: CPT

## 2024-05-09 PROCEDURE — 255N000002 HC RX 255 OP 636: Performed by: PHYSICIAN ASSISTANT

## 2024-05-09 PROCEDURE — A9585 GADOBUTROL INJECTION: HCPCS | Performed by: PHYSICIAN ASSISTANT

## 2024-05-09 PROCEDURE — 72197 MRI PELVIS W/O & W/DYE: CPT | Mod: 26 | Performed by: RADIOLOGY

## 2024-05-09 RX ORDER — GADOBUTROL 604.72 MG/ML
10 INJECTION INTRAVENOUS ONCE
Status: COMPLETED | OUTPATIENT
Start: 2024-05-09 | End: 2024-05-09

## 2024-05-09 RX ADMIN — GADOBUTROL 10 ML: 604.72 INJECTION INTRAVENOUS at 07:12

## 2024-05-10 ENCOUNTER — OFFICE VISIT (OUTPATIENT)
Dept: FAMILY MEDICINE | Facility: CLINIC | Age: 54
End: 2024-05-10
Payer: COMMERCIAL

## 2024-05-10 VITALS
BODY MASS INDEX: 35.84 KG/M2 | DIASTOLIC BLOOD PRESSURE: 86 MMHG | HEIGHT: 66 IN | WEIGHT: 223 LBS | HEART RATE: 74 BPM | TEMPERATURE: 97.8 F | OXYGEN SATURATION: 96 % | SYSTOLIC BLOOD PRESSURE: 134 MMHG | RESPIRATION RATE: 16 BRPM

## 2024-05-10 DIAGNOSIS — G89.29 CHRONIC BILATERAL LOW BACK PAIN WITHOUT SCIATICA: ICD-10-CM

## 2024-05-10 DIAGNOSIS — G96.191 TARLOV CYST: Primary | ICD-10-CM

## 2024-05-10 DIAGNOSIS — R93.89 ABNORMAL CHEST X-RAY: ICD-10-CM

## 2024-05-10 DIAGNOSIS — M54.50 CHRONIC BILATERAL LOW BACK PAIN WITHOUT SCIATICA: ICD-10-CM

## 2024-05-10 PROCEDURE — 99214 OFFICE O/P EST MOD 30 MIN: CPT | Performed by: NURSE PRACTITIONER

## 2024-05-10 RX ORDER — METHOTREXATE 25 MG/ML
15 INJECTION INTRA-ARTERIAL; INTRAMUSCULAR; INTRATHECAL; INTRAVENOUS
COMMUNITY
Start: 2024-04-24

## 2024-05-10 RX ORDER — FOLIC ACID 1 MG/1
1 TABLET ORAL
COMMUNITY
Start: 2024-04-24

## 2024-05-10 ASSESSMENT — PAIN SCALES - GENERAL: PAINLEVEL: MILD PAIN (3)

## 2024-05-10 NOTE — PROGRESS NOTES
"  Assessment & Plan     Tarlov cyst  Chronic bilateral low back pain without sciatica  Patient presents today with ongoing chronic low back pain, recent MRI imaging of sacrum noting an approximate 2.4 cm cystic structure, likely a Tarlov cyst.  Suspect this may be the cause of patient's pain.  Recommend follow-up with orthopedic spine specialist, referral placed.  - Spine  Referral; Future    Abnormal chest x-ray  Patient also had chest x-ray completed in late April by rheumatologist, noting a nodular opacity in the left lateral costophrenic angle, not previously noted on prior imaging.  Would recommend further evaluation with chest CT.  Patient to schedule at [phone].  Will call with results and recommendations.  - CT Chest w/o Contrast; Future          BMI  Estimated body mass index is 35.99 kg/m  as calculated from the following:    Height as of this encounter: 1.676 m (5' 6\").    Weight as of this encounter: 101.2 kg (223 lb).   Weight management plan: Discussed healthy diet and exercise guidelines      See Patient Instructions After discussion with patient, patient verbalizes and agreeable to receive AVS instructions via My Chart, not printed today     Chris Chance is a 54 year old, presenting for the following health issues:  Establish Care and Results (Review labs/imaging from [facility])        5/10/2024     8:16 AM   Additional Questions   Roomed by Arabella ZAMORA   Accompanied by Wife     History of Present Illness       Reason for visit:  There is a couple of new things.  A cycstor.tumor by pelvis  and spot on chest and major back pain  Symptom onset:  More than a month  Symptom intensity:  Severe  Symptom progression:  Worsening  Had these symptoms before:  Yes  Has tried/received treatment for these symptoms:  Yes  Previous treatment was successful:  No    He eats 2-3 servings of fruits and vegetables daily.He consumes 0 sweetened beverage(s) daily.He exercises with enough effort to " "increase his heart rate 10 to 19 minutes per day.  He exercises with enough effort to increase his heart rate 7 days per week.   He is taking medications regularly.     Seeing [facility] for Rheumatoid  Mother with history of breast, lung, colon and liver cancer ~ started in breast         Review of Systems  Constitutional, HEENT, cardiovascular, pulmonary, gi and gu systems are negative, except as otherwise noted.      Objective    /86 (BP Location: Right arm, Patient Position: Sitting, Cuff Size: Adult Regular)   Pulse 74   Temp 97.8  F (36.6  C) (Tympanic)   Resp 16   Ht 1.676 m (5' 6\")   Wt 101.2 kg (223 lb)   SpO2 96%   BMI 35.99 kg/m    Body mass index is 35.99 kg/m .  Physical Exam   GENERAL: alert and no distress  NECK: no adenopathy, no asymmetry, masses, or scars  RESP: lungs clear to auscultation - no rales, rhonchi or wheezes  CV: regular rate and rhythm, normal S1 S2, no S3 or S4, no murmur, click or rub, no peripheral edema  ABDOMEN: soft, nontender, no hepatosplenomegaly, no masses and bowel sounds normal  MS: no gross musculoskeletal defects noted, no edema  SKIN: no suspicious lesions or rashes  NEURO: Normal strength and tone, mentation intact and speech normal  PSYCH: mentation appears normal, affect normal/bright    Diagnostic Test Results:  Labs reviewed in Epic  Imaging reviewed and pending CT          Signed Electronically by: Adelaida Allen DNP,, ANDREW CNP      Chart documentation with Dragon Voice recognition Software. Although reviewed after completion, some words and grammatical errors may remain.   "

## 2024-05-14 ENCOUNTER — MYC MEDICAL ADVICE (OUTPATIENT)
Dept: FAMILY MEDICINE | Facility: CLINIC | Age: 54
End: 2024-05-14
Payer: COMMERCIAL

## 2024-05-17 ENCOUNTER — HOSPITAL ENCOUNTER (OUTPATIENT)
Dept: CT IMAGING | Facility: HOSPITAL | Age: 54
Discharge: HOME | End: 2024-05-17
Attending: NURSE PRACTITIONER | Admitting: NURSE PRACTITIONER
Payer: COMMERCIAL

## 2024-05-17 DIAGNOSIS — R93.89 ABNORMAL CHEST X-RAY: ICD-10-CM

## 2024-05-17 PROCEDURE — 71250 CT THORAX DX C-: CPT

## 2024-05-20 ENCOUNTER — OFFICE VISIT (OUTPATIENT)
Dept: PHYSICAL MEDICINE AND REHAB | Facility: CLINIC | Age: 54
End: 2024-05-20
Attending: NURSE PRACTITIONER
Payer: COMMERCIAL

## 2024-05-20 VITALS
DIASTOLIC BLOOD PRESSURE: 91 MMHG | SYSTOLIC BLOOD PRESSURE: 138 MMHG | WEIGHT: 225 LBS | HEIGHT: 67 IN | HEART RATE: 77 BPM | BODY MASS INDEX: 35.31 KG/M2

## 2024-05-20 DIAGNOSIS — G89.29 CHRONIC BILATERAL LOW BACK PAIN WITHOUT SCIATICA: ICD-10-CM

## 2024-05-20 DIAGNOSIS — G96.191 TARLOV CYST: ICD-10-CM

## 2024-05-20 DIAGNOSIS — M54.50 CHRONIC BILATERAL LOW BACK PAIN WITHOUT SCIATICA: ICD-10-CM

## 2024-05-20 PROCEDURE — 99204 OFFICE O/P NEW MOD 45 MIN: CPT | Performed by: NURSE PRACTITIONER

## 2024-05-20 ASSESSMENT — PAIN SCALES - GENERAL: PAINLEVEL: MILD PAIN (2)

## 2024-05-20 NOTE — LETTER
5/20/2024         RE: Robson Galeano  [address] [facility] [address]        Dear Colleague,    Thank you for referring your patient, Robson Galeano, to the [facility]. Please see a copy of my visit note below.    ASSESSMENT: Robson Galeano is a 54 year old male who presents for consultation at the request of PCP Nupur Torres, who presents today for new patient evaluation of:    -tarlov cyst    Patient has decreased sensation both feet on exam. Chronic back pain into left leg. He was referred for an opinion on his tarlov cyst.    We reviewed his sacral MRI. He has a 2.4 x 1.7 x 2/4 fusiform cystic lesion closely related to the left S3 nerve which radiologist feels most likely represents a tarlov cyst. We talked about the S3 dermatome being anastasiya-rectal area. He does endorse some chronic bowel urgency/discomfort. This is likely multifactorial given his bowel issues. I do not expect the lesion to be related to his back pain or leg symptoms. Would be reasonable to meet with a surgeon to have further discussion however and referral placed.    We reviewed his lumbar MRI. He has disc degeneration at L3-4 with degenerative endplate changes which looks to have progressed compared to 2021. He has disc bulges with left sided neural foraminal narrowing at several levels. Pars fractures L5 arin. We talked about option of EMG both legs for his progressing numbness/tingling. We talked about the option of steroid injections such as arin SI joint injections or a left TF MAISHA L4-5 for his back/leg symptoms. Given RFA with decreasing relief over the years, would not repeat.     Currently going through a stressful/emotional time as his brother was recently murdered and he was also recently diagnosed with emphysema. He is hoping to navigate the significance of the tarlov cyst piece and less of a priority currently is considering additional options for his chronic back/leg pain. He will  consider options and let us know how he would like to proceed.            5/17/2024     3:41 PM   OSWESTRY DISABILITY INDEX   Count 10   Sum 19   Oswestry Score (%) 38 %            Diagnoses and all orders for this visit:  Tarlov cyst  -     Spine  Referral  Chronic bilateral low back pain without sciatica  -     Spine  Referral      PLAN:  Reviewed spine anatomy and disease process. Discussed diagnosis and treatment options with the patient today. A shared decision making model was used.  The patient's values and choices were respected. The following represents what was discussed and decided upon by the provider and the patient.      -DIAGNOSTIC TESTS:  Images were personally reviewed and interpreted and explained to patient today using a spine model.   --no new imaging ordered    -PHYSICAL THERAPY:    --no PT at this time        -MEDICATIONS:    --patient expresses preference to continue otc medication    -INTERVENTIONS:    -Discussed the role of injections with patient today. Patient would be a good candidate in the future for either epidural steroid injections or medial branch blocks if indicated based on symptoms and supported by imaging results.  -Risks, benefits, and role of injections were discussed with patient today.     -PATIENT EDUCATION:  Total time of 40 minutes, on the day of service, spent with the patient, reviewing the chart, placing orders, and documenting.   -Today we also discussed the pros and cons of the current treatment plan.    -FOLLOW-UP:   we will call with imaging results    Advised patient to call the Spine Center if symptoms worsen, new numbness or weakness develops in the legs, or if they develop new or worsening problems controlling bladder or bowel function.   ______________________________________________________________________    SUBJECTIVE:    HPI:  Robson Galeano  Is a 54 year old male hx RA, COPD, anxiety, osteoarthritis who presents today for new patient  "evaluation of tarlov cyst    MRI and CT imaging revealing for 2.4cm nonenhancing T2 hyperintense cystic structure assoc with left S3 nerve distal to sacral neural foramina, likely representing a tarlov cyst.    Chronic back pain worsening. Radiates into the left lateral leg into the foot. At worst 8-9/10, at best 2/10.  Worse with bending/twisting. Improves with relaxing and use of THC gummies. He has chronic tingling on the bottoms of both feet, feels this has worsened.    No rectal region pain or numbness. He has a dull pain in this region sometimes worse on the left side  Generally legs do not feel as strong but no focal weakness. Takes him a lot longer to get going in the morning, almost a 1-2hr process in the mornings now.    He has had diverticulitis 4x in 3 mos, but no changes in bowel or bladder control.    Seen previously by Emely Mays a year ago. The first time he had it done it worked great, then the next time he had 50% relief for 1 yr from arin L3-4-5 RFA 1/6/22. Had repeat procedure 4/27/23 which he states relief was  limited, less than 50%    Last PT 2021 at [facility], continues home exercises    He uses medial marijuana which helps  He has over the years tried tylenol, ibuprofen, oral steroids without drastic improvement  He is on amitriptyline and gabapentin as well    Here with his wife Diamond today     -Treatment to Date:     -Medications:      Current Outpatient Medications   Medication Sig Dispense Refill     amitriptyline (ELAVIL) 10 MG tablet Take 1 tablet (10 mg) by mouth at bedtime 90 tablet 3     folic acid (FOLVITE) 1 MG tablet Take 1 mg by mouth       gabapentin (NEURONTIN) 300 MG capsule Take 1 capsule (300 mg) by mouth 2 times daily 180 capsule 3     methotrexate sodium, PF, 50 MG/2ML SOLN injection Inject 15 mg Subcutaneous       Needle, Disp, (B-D HYPODERMIC NEEDLE) 18G X 1-1/2\" MISC 1 each once a week 12 each 3     Probiotic Product (FORTIFY DAILY PROBIOTIC PO)        " "Syringe/Needle, Disp, 23G X 1-1/2\" 3 ML MISC 1 each once a week 12 each 3     tamsulosin (FLOMAX) 0.4 MG capsule Take 1 capsule (0.4 mg) by mouth daily 90 capsule 3     testosterone cypionate (DEPOTESTOSTERONE) 100 MG/ML injection Inject 1 mL (100 mg) into the muscle once a week 4 mL 5     valACYclovir (VALTREX) 1000 mg tablet Take 1 tablet (1,000 mg) by mouth daily 5 tablet 5     valACYclovir (VALTREX) 500 MG tablet Take 1 tablet (500 mg) by mouth daily 90 tablet 3     etanercept (ENBREL SURECLICK) 50 MG/ML autoinjector Inject 50 mg Subcutaneous every 7 days (Patient not taking: Reported on 5/10/2024) 4 mL 2     Filter Needles (BD FILTER NEEDLE) 18G X 1-1/2\" MISC USE ONCE WEEKLY WITH TESTOSTERONE (Patient not taking: Reported on 5/10/2024) 12 each 3     Needle, Disp, (B-D HYPODERMIC NEEDLE) 22G X 1\" MISC 1 each once a week (Patient not taking: Reported on 5/10/2024) 12 each 3     Syringe Luer Slip (B-D SYRINGE LUER-MARIALUISA) 3 ML MISC 1 each once a week (Patient not taking: Reported on 5/10/2024) 12 each 3     No current facility-administered medications for this visit.       Allergies   Allergen Reactions     Bee Venom Other (See Comments)     Hallucinates, no anaphylaxis  Hallucinates, no anaphylaxis  Hallucinates, no anaphylaxis         No past medical history on file.     Patient Active Problem List   Diagnosis     Non-recurrent unilateral inguinal hernia without obstruction or gangrene     Ocular migraine     Seropositive rheumatoid arthritis (H)     Hypogonadism male     Morbid obesity (H)     Chronic bilateral low back pain without sciatica     Benign prostatic hyperplasia, unspecified whether lower urinary tract symptoms present     Genital herpes simplex, unspecified site     Depression     Essential hypertension     Low testosterone in male     RACHELL (obstructive sleep apnea)     Polyarthritis     Type 2 HSV infection of penis       Past Surgical History:   Procedure Laterality Date     COLONOSCOPY N/A 1/23/2024 " "   Procedure: COLONOSCOPY, FLEXIBLE, WITH LESION REMOVAL USING SNARE;  Surgeon: Mason Herrera MD;  Location: [facility]     IR LUMBAR PUNCTURE  6/23/2021     IR LUMBAR PUNCTURE  8/4/2021     IR LUMBAR PUNCTURE  1/6/2022       No family history on file.    Reviewed past medical, surgical, and family history with patient found on new patient intake packet located in EMR Media tab.     SOCIAL HX: nonsmoker, alcohol use, medical marijuana use    ROS: positive for weight gain, headache, hoarseness, ringing in ears, shortness of breath, wheezing, abdominal pain, diarrhea, constipation, joint pain, muscle pain, muscle fatigue, anxiety.  Specifically negative for bowel/bladder dysfunction, balance changes, headache, dizziness, foot drop, fevers, chills, appetite changes, nausea/vomiting, unexplained weight loss. Otherwise 13 systems reviewed are negative. Please see the patient's intake questionnaire from today for details.    OBJECTIVE:  BP (!) 138/91   Pulse 77   Ht 5' 6.5\" (1.689 m)   Wt 225 lb (102.1 kg)   BMI 35.77 kg/m      PHYSICAL EXAMINATION:    --CONSTITUTIONAL:  Vital signs as above.  No acute distress.  The patient is well nourished and well groomed.  --PSYCHIATRIC:  Appropriate mood and affect. The patient is awake, alert, oriented to person, place, time and answering questions appropriately with clear speech.    --SKIN:  Skin over the face, bilateral lower extremities, and posterior torso is clean, dry, intact without rashes.    --RESPIRATORY: Normal rhythm and effort. No abnormal accessory muscle breathing patterns noted.   --ABDOMINAL:  Non-distended.    --GROSS MOTOR: Gait is non-antalgic. Easily arises from a seated position. Toe walking and heel walking are normal without significant difficulty.      --LOWER EXTREMITY MOTOR TESTING:  Hip flexion: right 5/5, left 5/5  Quads: right 5/5, left 5/5  Hamstrings: right 5/5, left 5/5  Dorsiflexion: right 5/5, left 5/5  Plantar flexion: right 5/5, left " 5/5    Great toe MTP extension/EHL: right 5/5, left 5/5    --NEUROLOGICAL:  1/4 patellar and achilles reflexes bilaterally. Sensation to light touch is intact throughout both lower extremities. Babinski is negative. No clonus.    --MUSCULOSKELETAL: Lumbar spine inspection reveals no evidence of deformity. Range of motion is moderately limited in lumbar flexion, extension, lateral rotation due to pain. No point tenderness to palpation lumbar spine. No paraspinal musculature tenderness.     Straight leg raising is negative.    --HIPS: Full range of motion bilaterally. Negative DEVIN and Negative FADIR bilaterally. Negative hip joint tenderness to palp.    --SACROILIAC JOINT: Distraction maneuver was negative. Gaenslen's Test was positive arin. Thigh thrust was positive arin. Sacroiliac Joint Compression Test was negative. One finger point test was positive arin. Positive arin SI joint tenderness to palpation.    --VASCULAR:  Bilateral lower extremities are warm without any discoloration.  There is no pitting edema of the bilateral lower extremities.    RESULTS:   Prior medical records from [facility]where were reviewed today.    Imaging: Spine imaging was personally reviewed and interpreted today. The images were shown to the patient and the findings were explained using a spine model.      CT Chest w/o Contrast    Result Date: 5/17/2024  EXAM: CT CHEST W/O CONTRAST LOCATION: [facility] DATE: 5/17/2024 INDICATION:  Abnormal chest x-ray; COMPARISON: The chest radiograph describing the abnormality was not immediately available for review. The report from a chest radiograph performed 4/24/2024 from [facility] was available and reviewed. TECHNIQUE: CT chest without IV contrast. Multiplanar reformats were obtained. Dose reduction techniques were used. CONTRAST: None. FINDINGS: LUNGS AND PLEURA: Mild paraseptal emphysema is present in the apices. Focal confluent subpleural bulla  present along the anterior apical pleura of the left upper lobe (series 3, image 59). Small territories of subpleural opacity are present along the posterior costal pleura of the lower lobes consistent with positional atelectasis. No airspace opacities. No pleural space abnormality. The report of the chest radiograph described a potential abnormality in the left lateral costophrenic angle. No opacity or lesion in this location. MEDIASTINUM: Cardiac chambers are normal in size. No pericardial effusion. Normal caliber thoracic aorta. No aortic or great vessel atheromatous calcifications. Esophagus is decompressed. No lymphadenopathy. The thyroid gland is normal. CORONARY ARTERY CALCIFICATION: None. UPPER ABDOMEN: Hepatic steatosis. MUSCULOSKELETAL: Disc space narrowing and discogenic sclerosis of T7-T8. Small marginal osteophytes at other mid and lower thoracic spine levels. No aggressive or destructive bone lesions. Several healed upper left rib fracture deformities.     IMPRESSION: 1.  No parenchymal abnormalities in the left lateral costophrenic sulcus, questioned on recent chest radiographs. 2.  Upper lung predominant emphysema. If patient is amenable and meets screening criteria, consider annual low-dose CT chest for lung cancer screening. 3.  Hepatic steatosis.     MR Sacrum and Coccyx w/o & w Contrast    Result Date: 5/9/2024  MR sacrum  without and with contrast 5/9/2024 8:26 AM Techniques: Multiplanar multisequence imaging of the sacrum was obtained before and after administration of intravenous contrast using tumor/infection protocol. Contrast: 10 mL Gadavist History: Neoplasm of sacral spinal nerve; Chronic bilateral low back pain without sciatica; Chronic bilateral low back pain without sciatica Comparison: CT abdomen pelvis 1/8/2024. Lumbar spine MR 4/23/2024 Findings: Rectosigmoid endoscopy clips with associated susceptibility artifact which degrades evaluation of the adjacent soft tissues. Osseous  structures Osseous structures: No fracture, stress reaction, avascular necrosis, or focal osseous lesion is seen. Chronic pars defects at L5. Joint and periarticular soft tissue Internal derangement of joints are not well assessed owing to chosen field of view. Partially visualized subchondral cystic change in the bilateral acetabula with paralabral cyst formation about the left acetabulum, indicative of overlying labral tear. Mild degenerative changes of the sacroiliac joints. Degenerative disc disease at L4-5. Muscles and tendons Muscles: Visualized muscles are unremarkable without evidence of muscle strain, atrophy or mass. Tendons: The visualized tendons are intact. Nerves: 2.4 x 1.7 x 2.4 cm fusiform T1 hypointense, T2 hyperintense cystic lesion closely related to the left S3 sacral nerve. No enhancement on postcontrast sequences. Other Findings: None.     Impression: Examination degraded by metallic artifact related to endoscopy clip. 2.4 cm nonenhancing T2 hyperintense cystic structure intimately associated with the left S3 nerve distal to the sacral neural foramina, most likely representing a Tarlov cyst. I have personally reviewed the examination and initial interpretation and I agree with the findings. LUCAS DEJESUS MD (Joe)   SYSTEM ID:  [id number]      MR Lumbar Spine w/o & w Contrast    Result Date: 4/24/2024  EXAM: MR LUMBAR SPINE W/O and W CONTRAST LOCATION: [facility] DATE: 4/23/2024 INDICATION:  Neoplasm of sacral spinal nerve, Chronic bilateral low back pain without sciatica, Chronic bilateral low back pain without sciatica COMPARISON: 05/28/2021. CONTRAST: 10ml gadavist TECHNIQUE: Routine Lumbar Spine MRI without and with IV contrast. FINDINGS: Degenerative endplate changes with Modic type I degenerative endplate changes at L3-L4 enhancement findings on postcontrast imaging in this area as well. Conus terminates normally. Straightening of the normal lumbar  lordosis. L1-L2: No canal stenosis. No significant foraminal narrowing. Facet disease. L2-L3: Broad-based disc bulge eccentric to the left. Moderate left foraminal narrowing. L3-L4: Broad-based disc bulge. Mild canal stenosis. Facet disease. Mild right and moderate left foraminal narrowing. L4-L5: Broad-based disc bulge. Mild canal stenosis. Facet disease. Severe right and moderate left foraminal narrowing. L5-S1: Broad-based disc bulge without canal stenosis. Facet disease. No significant foraminal narrowing.     IMPRESSION: 1.  Modic type I degenerative endplate changes at L3-L4. The left sacral lesion identified on prior CT abdomen pelvis from 01/08/2024 or is not visualized on this examination but this acquisition does not include the entirety of the sacral distribution. Recommend dedicated sacrum/sacroiliac MR without and with contrast.        This note was dictated using voice recognition software. Any grammatical or context distortions are unintentional and inherent to the software.       Yamel CONROYP-C  [facility]  O. [phone]             Again, thank you for allowing me to participate in the care of your patient.        Sincerely,        Yamel Curiel, ANDREW CNP

## 2024-05-20 NOTE — PATIENT INSTRUCTIONS
You have been referred to [facility] Neurosurgery for a surgical consult. A surgeon OR an advanced practitioner will review your imaging and talk with you further at your appointment to see if a surgery could be helpful.  Please be aware you may need more imaging or testing prior to or after your appointment if they feel it is needed. They will call you to schedule an appointment here at the Spine Center. If you do not hear anything in a couple of days, please contact the Neurosurgery Department to schedule your appointment at #: [phone]     ~Please call our [facility] Nurse Navigation line [phone] with any questions or concerns about your treatment plan, if symptoms worsen and you would like to be seen urgently, or if you have any new or worsening numbness, weakness, or problems controlling bladder and bowel function.  ~You are also welcome to contact Yamel Curiel via Pigeonly, but please be aware that responses to Pigeonly message may take 2-3 days due to the high volume of patients seen in clinic.

## 2024-05-20 NOTE — PROGRESS NOTES
ASSESSMENT: Robson Galeano is a 54 year old male who presents for consultation at the request of PCP Nupur Torres, who presents today for new patient evaluation of:    -tarlov cyst    Patient has decreased sensation both feet on exam. Chronic back pain into left leg. He was referred for an opinion on his tarlov cyst.    We reviewed his sacral MRI. He has a 2.4 x 1.7 x 2/4 fusiform cystic lesion closely related to the left S3 nerve which radiologist feels most likely represents a tarlov cyst. We talked about the S3 dermatome being anastasiya-rectal area. He does endorse some chronic bowel urgency/discomfort. This is likely multifactorial given his bowel issues. I do not expect the lesion to be related to his back pain or leg symptoms. Would be reasonable to meet with a surgeon to have further discussion however and referral placed.    We reviewed his lumbar MRI. He has disc degeneration at L3-4 with degenerative endplate changes which looks to have progressed compared to 2021. He has disc bulges with left sided neural foraminal narrowing at several levels. Pars fractures L5 arin. We talked about option of EMG both legs for his progressing numbness/tingling. We talked about the option of steroid injections such as arin SI joint injections or a left TF MAISHA L4-5 for his back/leg symptoms. Given RFA with decreasing relief over the years, would not repeat.     Currently going through a stressful/emotional time as his brother was recently murdered and he was also recently diagnosed with emphysema. He is hoping to navigate the significance of the tarlov cyst piece and less of a priority currently is considering additional options for his chronic back/leg pain. He will consider options and let us know how he would like to proceed.            5/17/2024     3:41 PM   OSWESTRY DISABILITY INDEX   Count 10   Sum 19   Oswestry Score (%) 38 %            Diagnoses and all orders for this visit:  Tarlov cyst  -     Spine   Referral  Chronic bilateral low back pain without sciatica  -     Spine  Referral      PLAN:  Reviewed spine anatomy and disease process. Discussed diagnosis and treatment options with the patient today. A shared decision making model was used.  The patient's values and choices were respected. The following represents what was discussed and decided upon by the provider and the patient.      -DIAGNOSTIC TESTS:  Images were personally reviewed and interpreted and explained to patient today using a spine model.   --no new imaging ordered    -PHYSICAL THERAPY:    --no PT at this time        -MEDICATIONS:    --patient expresses preference to continue otc medication    -INTERVENTIONS:    -Discussed the role of injections with patient today. Patient would be a good candidate in the future for either epidural steroid injections or medial branch blocks if indicated based on symptoms and supported by imaging results.  -Risks, benefits, and role of injections were discussed with patient today.     -PATIENT EDUCATION:  Total time of 40 minutes, on the day of service, spent with the patient, reviewing the chart, placing orders, and documenting.   -Today we also discussed the pros and cons of the current treatment plan.    -FOLLOW-UP:   we will call with imaging results    Advised patient to call the [facility] if symptoms worsen, new numbness or weakness develops in the legs, or if they develop new or worsening problems controlling bladder or bowel function.   ______________________________________________________________________    SUBJECTIVE:    HPI:  Robson Galeano  Is a 54 year old male hx RA, COPD, anxiety, osteoarthritis who presents today for new patient evaluation of tarlov cyst    MRI and CT imaging revealing for 2.4cm nonenhancing T2 hyperintense cystic structure assoc with left S3 nerve distal to sacral neural foramina, likely representing a tarlov cyst.    Chronic back pain worsening. Radiates into the  "left lateral leg into the foot. At worst 8-9/10, at best 2/10.  Worse with bending/twisting. Improves with relaxing and use of THC gummies. He has chronic tingling on the bottoms of both feet, feels this has worsened.    No rectal region pain or numbness. He has a dull pain in this region sometimes worse on the left side  Generally legs do not feel as strong but no focal weakness. Takes him a lot longer to get going in the morning, almost a 1-2hr process in the mornings now.    He has had diverticulitis 4x in 3 mos, but no changes in bowel or bladder control.    Seen previously by Emely Mays a year ago. The first time he had it done it worked great, then the next time he had 50% relief for 1 yr from arin L3-4-5 RFA 1/6/22. Had repeat procedure 4/27/23 which he states relief was  limited, less than 50%    Last PT 2021 at [facility], continues home exercises    He uses medial marijuana which helps  He has over the years tried tylenol, ibuprofen, oral steroids without drastic improvement  He is on amitriptyline and gabapentin as well    Here with his wife Diamond today     -Treatment to Date:     -Medications:      Current Outpatient Medications   Medication Sig Dispense Refill    amitriptyline (ELAVIL) 10 MG tablet Take 1 tablet (10 mg) by mouth at bedtime 90 tablet 3    folic acid (FOLVITE) 1 MG tablet Take 1 mg by mouth      gabapentin (NEURONTIN) 300 MG capsule Take 1 capsule (300 mg) by mouth 2 times daily 180 capsule 3    methotrexate sodium, PF, 50 MG/2ML SOLN injection Inject 15 mg Subcutaneous      Needle, Disp, (B-D HYPODERMIC NEEDLE) 18G X 1-1/2\" MISC 1 each once a week 12 each 3    Probiotic Product (FORTIFY DAILY PROBIOTIC PO)       Syringe/Needle, Disp, 23G X 1-1/2\" 3 ML MISC 1 each once a week 12 each 3    tamsulosin (FLOMAX) 0.4 MG capsule Take 1 capsule (0.4 mg) by mouth daily 90 capsule 3    testosterone cypionate (DEPOTESTOSTERONE) 100 MG/ML injection Inject 1 mL (100 mg) into the muscle once a " "week 4 mL 5    valACYclovir (VALTREX) 1000 mg tablet Take 1 tablet (1,000 mg) by mouth daily 5 tablet 5    valACYclovir (VALTREX) 500 MG tablet Take 1 tablet (500 mg) by mouth daily 90 tablet 3    etanercept (ENBREL SURECLICK) 50 MG/ML autoinjector Inject 50 mg Subcutaneous every 7 days (Patient not taking: Reported on 5/10/2024) 4 mL 2    Filter Needles (BD FILTER NEEDLE) 18G X 1-1/2\" MISC USE ONCE WEEKLY WITH TESTOSTERONE (Patient not taking: Reported on 5/10/2024) 12 each 3    Needle, Disp, (B-D HYPODERMIC NEEDLE) 22G X 1\" MISC 1 each once a week (Patient not taking: Reported on 5/10/2024) 12 each 3    Syringe Luer Slip (B-D SYRINGE LUER-MARIALUISA) 3 ML MISC 1 each once a week (Patient not taking: Reported on 5/10/2024) 12 each 3     No current facility-administered medications for this visit.       Allergies   Allergen Reactions    Bee Venom Other (See Comments)     Hallucinates, no anaphylaxis  Hallucinates, no anaphylaxis  Hallucinates, no anaphylaxis         No past medical history on file.     Patient Active Problem List   Diagnosis    Non-recurrent unilateral inguinal hernia without obstruction or gangrene    Ocular migraine    Seropositive rheumatoid arthritis (H)    Hypogonadism male    Morbid obesity (H)    Chronic bilateral low back pain without sciatica    Benign prostatic hyperplasia, unspecified whether lower urinary tract symptoms present    Genital herpes simplex, unspecified site    Depression    Essential hypertension    Low testosterone in male    RACHELL (obstructive sleep apnea)    Polyarthritis    Type 2 HSV infection of penis       Past Surgical History:   Procedure Laterality Date    COLONOSCOPY N/A 1/23/2024    Procedure: COLONOSCOPY, FLEXIBLE, WITH LESION REMOVAL USING SNARE;  Surgeon: Mason Herrera MD;  Location: [facility]    IR LUMBAR PUNCTURE  6/23/2021    IR LUMBAR PUNCTURE  8/4/2021    IR LUMBAR PUNCTURE  1/6/2022       No family history on file.    Reviewed past medical, surgical, and " "family history with patient found on new patient intake packet located in [employer]MR [employer] tab.     SOCIAL HX: nonsmoker, alcohol use, medical marijuana use    ROS: positive for weight gain, headache, hoarseness, ringing in ears, shortness of breath, wheezing, abdominal pain, diarrhea, constipation, joint pain, muscle pain, muscle fatigue, anxiety.  Specifically negative for bowel/bladder dysfunction, balance changes, headache, dizziness, foot drop, fevers, chills, appetite changes, nausea/vomiting, unexplained weight loss. Otherwise 13 systems reviewed are negative. Please see the patient's intake questionnaire from today for details.    OBJECTIVE:  BP (!) 138/91   Pulse 77   Ht 5' 6.5\" (1.689 m)   Wt 225 lb (102.1 kg)   BMI 35.77 kg/m      PHYSICAL EXAMINATION:    --CONSTITUTIONAL:  Vital signs as above.  No acute distress.  The patient is well nourished and well groomed.  --PSYCHIATRIC:  Appropriate mood and affect. The patient is awake, alert, oriented to person, place, time and answering questions appropriately with clear speech.    --SKIN:  Skin over the face, bilateral lower extremities, and posterior torso is clean, dry, intact without rashes.    --RESPIRATORY: Normal rhythm and effort. No abnormal accessory muscle breathing patterns noted.   --ABDOMINAL:  Non-distended.    --GROSS MOTOR: Gait is non-antalgic. Easily arises from a seated position. Toe walking and heel walking are normal without significant difficulty.      --LOWER EXTREMITY MOTOR TESTING:  Hip flexion: right 5/5, left 5/5  Quads: right 5/5, left 5/5  Hamstrings: right 5/5, left 5/5  Dorsiflexion: right 5/5, left 5/5  Plantar flexion: right 5/5, left 5/5    Great toe MTP extension/EHL: right 5/5, left 5/5    --NEUROLOGICAL:  1/4 patellar and achilles reflexes bilaterally. Sensation to light touch is intact throughout both lower extremities. Babinski is negative. No clonus.    --MUSCULOSKELETAL: Lumbar spine inspection reveals no evidence of " deformity. Range of motion is moderately limited in lumbar flexion, extension, lateral rotation due to pain. No point tenderness to palpation lumbar spine. No paraspinal musculature tenderness.     Straight leg raising is negative.    --HIPS: Full range of motion bilaterally. Negative DEVIN and Negative FADIR bilaterally. Negative hip joint tenderness to palp.    --SACROILIAC JOINT: Distraction maneuver was negative. Gaenslen's Test was positive arin. Thigh thrust was positive arin. Sacroiliac Joint Compression Test was negative. One finger point test was positive arin. Positive arin SI joint tenderness to palpation.    --VASCULAR:  Bilateral lower extremities are warm without any discoloration.  There is no pitting edema of the bilateral lower extremities.    RESULTS:   Prior medical records from [facility] were reviewed today.    Imaging: Spine imaging was personally reviewed and interpreted today. The images were shown to the patient and the findings were explained using a spine model.      CT Chest w/o Contrast    Result Date: 5/17/2024  EXAM: CT CHEST W/O CONTRAST LOCATION: [facility] DATE: 5/17/2024 INDICATION:  Abnormal chest x-ray; COMPARISON: The chest radiograph describing the abnormality was not immediately available for review. The report from a chest radiograph performed 4/24/2024 from [facility] was available and reviewed. TECHNIQUE: CT chest without IV contrast. Multiplanar reformats were obtained. Dose reduction techniques were used. CONTRAST: None. FINDINGS: LUNGS AND PLEURA: Mild paraseptal emphysema is present in the apices. Focal confluent subpleural bulla present along the anterior apical pleura of the left upper lobe (series 3, image 59). Small territories of subpleural opacity are present along the posterior costal pleura of the lower lobes consistent with positional atelectasis. No airspace opacities. No pleural space abnormality. The report of  the chest radiograph described a potential abnormality in the left lateral costophrenic angle. No opacity or lesion in this location. MEDIASTINUM: Cardiac chambers are normal in size. No pericardial effusion. Normal caliber thoracic aorta. No aortic or great vessel atheromatous calcifications. Esophagus is decompressed. No lymphadenopathy. The thyroid gland is normal. CORONARY ARTERY CALCIFICATION: None. UPPER ABDOMEN: Hepatic steatosis. MUSCULOSKELETAL: Disc space narrowing and discogenic sclerosis of T7-T8. Small marginal osteophytes at other mid and lower thoracic spine levels. No aggressive or destructive bone lesions. Several healed upper left rib fracture deformities.     IMPRESSION: 1.  No parenchymal abnormalities in the left lateral costophrenic sulcus, questioned on recent chest radiographs. 2.  Upper lung predominant emphysema. If patient is amenable and meets screening criteria, consider annual low-dose CT chest for lung cancer screening. 3.  Hepatic steatosis.     MR Sacrum and Coccyx w/o & w Contrast    Result Date: 5/9/2024  MR sacrum  without and with contrast 5/9/2024 8:26 AM Techniques: Multiplanar multisequence imaging of the sacrum was obtained before and after administration of intravenous contrast using tumor/infection protocol. Contrast: 10 mL Gadavist History: Neoplasm of sacral spinal nerve; Chronic bilateral low back pain without sciatica; Chronic bilateral low back pain without sciatica Comparison: CT abdomen pelvis 1/8/2024. Lumbar spine MR 4/23/2024 Findings: Rectosigmoid endoscopy clips with associated susceptibility artifact which degrades evaluation of the adjacent soft tissues. Osseous structures Osseous structures: No fracture, stress reaction, avascular necrosis, or focal osseous lesion is seen. Chronic pars defects at L5. Joint and periarticular soft tissue Internal derangement of joints are not well assessed owing to chosen field of view. Partially visualized subchondral cystic  change in the bilateral acetabula with paralabral cyst formation about the left acetabulum, indicative of overlying labral tear. Mild degenerative changes of the sacroiliac joints. Degenerative disc disease at L4-5. Muscles and tendons Muscles: Visualized muscles are unremarkable without evidence of muscle strain, atrophy or mass. Tendons: The visualized tendons are intact. Nerves: 2.4 x 1.7 x 2.4 cm fusiform T1 hypointense, T2 hyperintense cystic lesion closely related to the left S3 sacral nerve. No enhancement on postcontrast sequences. Other Findings: None.     Impression: Examination degraded by metallic artifact related to endoscopy clip. 2.4 cm nonenhancing T2 hyperintense cystic structure intimately associated with the left S3 nerve distal to the sacral neural foramina, most likely representing a Tarlov cyst. I have personally reviewed the examination and initial interpretation and I agree with the findings. LUCAS DEJESUS MD (Joe)   SYSTEM ID:  [id number]      MR Lumbar Spine w/o & w Contrast    Result Date: 4/24/2024  EXAM: MR LUMBAR SPINE W/O and W CONTRAST LOCATION: [facility] DATE: 4/23/2024 INDICATION:  Neoplasm of sacral spinal nerve, Chronic bilateral low back pain without sciatica, Chronic bilateral low back pain without sciatica COMPARISON: 05/28/2021. CONTRAST: 10ml gadavist TECHNIQUE: Routine Lumbar Spine MRI without and with IV contrast. FINDINGS: Degenerative endplate changes with Modic type I degenerative endplate changes at L3-L4 enhancement findings on postcontrast imaging in this area as well. Conus terminates normally. Straightening of the normal lumbar lordosis. L1-L2: No canal stenosis. No significant foraminal narrowing. Facet disease. L2-L3: Broad-based disc bulge eccentric to the left. Moderate left foraminal narrowing. L3-L4: Broad-based disc bulge. Mild canal stenosis. Facet disease. Mild right and moderate left foraminal narrowing. L4-L5: Broad-based  disc bulge. Mild canal stenosis. Facet disease. Severe right and moderate left foraminal narrowing. L5-S1: Broad-based disc bulge without canal stenosis. Facet disease. No significant foraminal narrowing.     IMPRESSION: 1.  Modic type I degenerative endplate changes at L3-L4. The left sacral lesion identified on prior CT abdomen pelvis from 01/08/2024 or is not visualized on this examination but this acquisition does not include the entirety of the sacral distribution. Recommend dedicated sacrum/sacroiliac MR without and with contrast.        This note was dictated using voice recognition software. Any grammatical or context distortions are unintentional and inherent to the software.       Yamel Curiel FNP-C  [facility]  O. [phone]

## 2024-05-22 ENCOUNTER — LAB (OUTPATIENT)
Dept: LAB | Facility: CLINIC | Age: 54
End: 2024-05-22
Payer: COMMERCIAL

## 2024-05-22 DIAGNOSIS — Z79.899 HIGH RISK MEDICATION USE: ICD-10-CM

## 2024-05-22 DIAGNOSIS — Z79.899 HIGH RISK MEDICATIONS (NOT ANTICOAGULANTS) LONG-TERM USE: ICD-10-CM

## 2024-05-22 DIAGNOSIS — M05.9 SEROPOSITIVE RHEUMATOID ARTHRITIS (H): ICD-10-CM

## 2024-05-22 DIAGNOSIS — Z11.4 SCREENING FOR HIV (HUMAN IMMUNODEFICIENCY VIRUS): Primary | ICD-10-CM

## 2024-05-22 LAB
ALBUMIN SERPL BCG-MCNC: 4.2 G/DL (ref 3.5–5.2)
ALT SERPL W P-5'-P-CCNC: 37 U/L (ref 0–70)
AST SERPL W P-5'-P-CCNC: 21 U/L (ref 0–45)
BASOPHILS # BLD AUTO: 0 10E3/UL (ref 0–0.2)
BASOPHILS NFR BLD AUTO: 1 %
CREAT SERPL-MCNC: 1.05 MG/DL (ref 0.67–1.17)
CRP SERPL-MCNC: <3 MG/L
EGFRCR SERPLBLD CKD-EPI 2021: 83 ML/MIN/1.73M2
EOSINOPHIL # BLD AUTO: 0.2 10E3/UL (ref 0–0.7)
EOSINOPHIL NFR BLD AUTO: 3 %
ERYTHROCYTE [DISTWIDTH] IN BLOOD BY AUTOMATED COUNT: 13.4 % (ref 10–15)
ERYTHROCYTE [SEDIMENTATION RATE] IN BLOOD BY WESTERGREN METHOD: 45 MM/HR (ref 0–20)
HCT VFR BLD AUTO: 44.1 % (ref 40–53)
HGB BLD-MCNC: 15 G/DL (ref 13.3–17.7)
IMM GRANULOCYTES # BLD: 0 10E3/UL
IMM GRANULOCYTES NFR BLD: 1 %
LYMPHOCYTES # BLD AUTO: 1.2 10E3/UL (ref 0.8–5.3)
LYMPHOCYTES NFR BLD AUTO: 23 %
MCH RBC QN AUTO: 30.3 PG (ref 26.5–33)
MCHC RBC AUTO-ENTMCNC: 34 G/DL (ref 31.5–36.5)
MCV RBC AUTO: 89 FL (ref 78–100)
MONOCYTES # BLD AUTO: 0.5 10E3/UL (ref 0–1.3)
MONOCYTES NFR BLD AUTO: 9 %
NEUTROPHILS # BLD AUTO: 3.4 10E3/UL (ref 1.6–8.3)
NEUTROPHILS NFR BLD AUTO: 64 %
PLATELET # BLD AUTO: 179 10E3/UL (ref 150–450)
RBC # BLD AUTO: 4.95 10E6/UL (ref 4.4–5.9)
WBC # BLD AUTO: 5.3 10E3/UL (ref 4–11)

## 2024-05-22 PROCEDURE — 84450 TRANSFERASE (AST) (SGOT): CPT

## 2024-05-22 PROCEDURE — 86140 C-REACTIVE PROTEIN: CPT

## 2024-05-22 PROCEDURE — 84460 ALANINE AMINO (ALT) (SGPT): CPT

## 2024-05-22 PROCEDURE — 82040 ASSAY OF SERUM ALBUMIN: CPT

## 2024-05-22 PROCEDURE — 85652 RBC SED RATE AUTOMATED: CPT

## 2024-05-22 PROCEDURE — 85025 COMPLETE CBC W/AUTO DIFF WBC: CPT

## 2024-05-22 PROCEDURE — 36415 COLL VENOUS BLD VENIPUNCTURE: CPT

## 2024-05-22 PROCEDURE — 82565 ASSAY OF CREATININE: CPT

## 2024-05-30 ENCOUNTER — MYC MEDICAL ADVICE (OUTPATIENT)
Dept: FAMILY MEDICINE | Facility: CLINIC | Age: 54
End: 2024-05-30
Payer: COMMERCIAL

## 2024-05-30 DIAGNOSIS — J43.9 PULMONARY EMPHYSEMA, UNSPECIFIED EMPHYSEMA TYPE (H): Primary | ICD-10-CM

## 2024-05-30 NOTE — TELEPHONE ENCOUNTER
SPINE PATIENTS - NEW PROTOCOL PREVISIT    RECORDS RECEIVED FROM: Referred by Nupur Torres APRN CNP   REASON FOR VISIT: tarlov cyst left S3, Chronic bilateral low back pain without sciatica.   PROVIDER: Kirk   DATE OF APPT: 06/06/2024   NOTES (FOR ALL VISITS) STATUS DETAILS   OFFICE NOTE from referring provider Internal Referral and notes in chart   OFFICE NOTE from other specialist Internal RFA Lumbar 04/27/2023  Lumbar puncture 01/06/2022  Prev PT, last completed 10/30/2021   DISCHARGE SUMMARY from hospital N/A    DISCHARGE REPORT from ER N/A    OPERATIVE REPORT N/A    EMG REPORT N/A    MEDICATION LIST N/A    IMAGING  (FOR ALL VISITS)     MRI (HEAD, NECK, SPINE) Internal MRI Sacrum 05/09/2024  MRI Lumbar 04/23/2024   XRAY (SPINE) *NEUROSURGERY* N/A    CT (HEAD, NECK, SPINE) N/A

## 2024-05-31 NOTE — TELEPHONE ENCOUNTER
Pls see [employer] message below.     Pulmonology referral pended, message routed to provider for consideration.     Julie Behrendt RN

## 2024-06-03 ENCOUNTER — HOSPITAL ENCOUNTER (OUTPATIENT)
Dept: GENERAL RADIOLOGY | Facility: CLINIC | Age: 54
Discharge: HOME | End: 2024-06-03
Attending: SURGERY | Admitting: SURGERY
Payer: COMMERCIAL

## 2024-06-03 DIAGNOSIS — G89.29 CHRONIC BILATERAL LOW BACK PAIN WITHOUT SCIATICA: ICD-10-CM

## 2024-06-03 DIAGNOSIS — M54.50 CHRONIC BILATERAL LOW BACK PAIN WITHOUT SCIATICA: ICD-10-CM

## 2024-06-03 DIAGNOSIS — M54.50 CHRONIC BILATERAL LOW BACK PAIN WITHOUT SCIATICA: Primary | ICD-10-CM

## 2024-06-03 DIAGNOSIS — G89.29 CHRONIC BILATERAL LOW BACK PAIN WITHOUT SCIATICA: Primary | ICD-10-CM

## 2024-06-03 DIAGNOSIS — J43.9 PULMONARY EMPHYSEMA (H): Primary | ICD-10-CM

## 2024-06-03 PROCEDURE — 72120 X-RAY BEND ONLY L-S SPINE: CPT

## 2024-06-03 NOTE — PATIENT INSTRUCTIONS
Tarlov cyst  Chronic bilateral low back pain without sciatica  Patient presents today with ongoing chronic low back pain, recent MRI imaging of sacrum noting an approximate 2.4 cm cystic structure, likely a Tarlov cyst.  Suspect this may be the cause of patient's pain.  Recommend follow-up with orthopedic spine specialist, referral placed.  - Spine  Referral; Future    Abnormal chest x-ray  Patient also had chest x-ray completed in late April by rheumatologist, noting a nodular opacity in the left lateral costophrenic angle, not previously noted on prior imaging.  Would recommend further evaluation with chest CT.  Patient to schedule at [phone].  Will call with results and recommendations.  - CT Chest w/o Contrast; Future

## 2024-06-06 ENCOUNTER — PRE VISIT (OUTPATIENT)
Dept: NEUROSURGERY | Facility: CLINIC | Age: 54
End: 2024-06-06

## 2024-06-06 ENCOUNTER — OFFICE VISIT (OUTPATIENT)
Dept: NEUROSURGERY | Facility: CLINIC | Age: 54
End: 2024-06-06
Payer: COMMERCIAL

## 2024-06-06 VITALS
HEIGHT: 67 IN | OXYGEN SATURATION: 95 % | WEIGHT: 225 LBS | BODY MASS INDEX: 35.31 KG/M2 | SYSTOLIC BLOOD PRESSURE: 136 MMHG | DIASTOLIC BLOOD PRESSURE: 82 MMHG | HEART RATE: 64 BPM

## 2024-06-06 DIAGNOSIS — M54.16 LUMBAR RADICULOPATHY: Primary | ICD-10-CM

## 2024-06-06 DIAGNOSIS — G96.191 TARLOV CYST: ICD-10-CM

## 2024-06-06 PROCEDURE — 99214 OFFICE O/P EST MOD 30 MIN: CPT | Performed by: SURGERY

## 2024-06-06 PROCEDURE — G2211 COMPLEX E/M VISIT ADD ON: HCPCS | Performed by: SURGERY

## 2024-06-06 ASSESSMENT — PAIN SCALES - GENERAL: PAINLEVEL: MILD PAIN (3)

## 2024-06-06 NOTE — LETTER
6/6/2024      Robson Galeano  [address] [facility] [address]      Dear Colleague,    Thank you for referring your patient, Robson Galeano, to the [facility]UROSURGERY. Please see a copy of my visit note below.    NEUROSURGERY CONSULTATION NOTE      CONSULTATION ASSESSMENT AND PLAN:    55 yo male who presents with low back and bilateral lower extremity pain, urge to defecate, feet numbness and tingling and occasional weakness in legs and urinary incontinence.  Lumbar x-ray shows mild anterior wedging of T11 and L5 pars defects with 1 mm retrolisthesis lumbar 1-2 and lumbar 2 3 without any significant instability.  MRI lumbar spine shows severe right and moderate left foraminal narrowing at lumbar 4-5 and moderate left foraminal narrowing at lumbar 3 5 and 4 and otherwise mild to no significant narrowing elsewhere.  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  Pain not likely associated with unilateral S3 cyst. Not well visualized on past films at this region. Chronicity to cyst unknown. Recommend repeat MRI in 6 months to assess for interval change. His pain is his largest complaint. Recommend EMG to rule out neuropathy. Also could  consider bilateral lumbar 4-5 TFESI as SNRI given pain is described as posterior legs and he does have stenosis on MRI at lumbar 4-5 but that would cause anterior leg pain.     Kayla Bradley MD      HISTORY OF PRESENTING ILLNESS:    55 yo male who presents with low back and bilateral lower extremity pain. Years of symptoms. Past injections with temporary relief. Pain travels mostly down posterior leg to his knee and at times down to his foot. He feels a constant urge to defecate. Feet have numbness and tingling only. Legs sometimes feel weak. Occasional urinary incontinence. Particularly with flexion. Has arthritis in his hips as well. Some different feeling in groin and buttocks.     Last  "[facility] in [facility]. [place] aggravated his symptoms.  Also has tried tylenol, ibuprofen, oral steroids,amitriptyline and gabapentin. No relief significantly with any of these.   S/p bilateral lumbar 3-5 RFA in 2022 and in 2023. That significantly improved his low back pain.     No past medical history on file.    Past Surgical History:   Procedure Laterality Date     COLONOSCOPY N/A 1/23/2024    Procedure: COLONOSCOPY, FLEXIBLE, WITH LESION REMOVAL USING SNARE;  Surgeon: Mason Herrera MD;  Location: [facility]     IR LUMBAR PUNCTURE  6/23/2021     IR LUMBAR PUNCTURE  8/4/2021     IR LUMBAR PUNCTURE  1/6/2022       REVIEW OF SYSTEMS:  See ROS form under media     MEDICATIONS:    Current Outpatient Medications   Medication Sig Dispense Refill     amitriptyline (ELAVIL) 10 MG tablet Take 1 tablet (10 mg) by mouth at bedtime 90 tablet 3     folic acid (FOLVITE) 1 MG tablet Take 1 mg by mouth       gabapentin (NEURONTIN) 300 MG capsule Take 1 capsule (300 mg) by mouth 2 times daily 180 capsule 3     methotrexate sodium, PF, 50 MG/2ML SOLN injection Inject 15 mg Subcutaneous       Needle, Disp, (B-D HYPODERMIC NEEDLE) 18G X 1-1/2\" MISC 1 each once a week 12 each 3     Probiotic Product (FORTIFY DAILY PROBIOTIC PO)        Syringe/Needle, Disp, 23G X 1-1/2\" 3 ML MISC 1 each once a week 12 each 3     tamsulosin (FLOMAX) 0.4 MG capsule Take 1 capsule (0.4 mg) by mouth daily 90 capsule 3     testosterone cypionate (DEPOTESTOSTERONE) 100 MG/ML injection Inject 1 mL (100 mg) into the muscle once a week 4 mL 5     valACYclovir (VALTREX) 1000 mg tablet Take 1 tablet (1,000 mg) by mouth daily 5 tablet 5     valACYclovir (VALTREX) 500 MG tablet Take 1 tablet (500 mg) by mouth daily 90 tablet 3     etanercept (ENBREL SURECLICK) 50 MG/ML autoinjector Inject 50 mg Subcutaneous every 7 days (Patient not taking: Reported on 5/10/2024) 4 mL 2     Filter Needles (BD FILTER NEEDLE) 18G X 1-1/2\" MISC USE ONCE " "WEEKLY WITH TESTOSTERONE (Patient not taking: Reported on 5/10/2024) 12 each 3     Needle, Disp, (B-D HYPODERMIC NEEDLE) 22G X 1\" MISC 1 each once a week (Patient not taking: Reported on 5/10/2024) 12 each 3     Syringe Luer Slip (B-D SYRINGE LUER-MARIALUISA) 3 ML MISC 1 each once a week (Patient not taking: Reported on 5/10/2024) 12 each 3         ALLERGIES/SENSITIVITIES:     Allergies   Allergen Reactions     Bee Venom Other (See Comments)     Hallucinates, no anaphylaxis  Hallucinates, no anaphylaxis  Hallucinates, no anaphylaxis         PERTINENT SOCIAL HISTORY:   Social History     Socioeconomic History     Marital status:    Tobacco Use     Smoking status: Former     Current packs/day: 0.00     Types: Cigarettes     Quit date: 2002     Years since quittin.4     Smokeless tobacco: Never   Vaping Use     Vaping status: Never Used   Substance and Sexual Activity     Alcohol use: Not Currently     Drug use: Yes     Types: Marijuana     Comment: has marijuana card     Social Determinants of Health     Financial Resource Strain: Low Risk  (3/20/2024)    Financial Resource Strain      Within the past 12 months, have you or your family members you live with been unable to get utilities (heat, electricity) when it was really needed?: No   Food Insecurity: No Food Insecurity (2024)    Received from [facility]    Hunger Vital Sign      Worried About Running Out of Food in the Last Year: Never true      Ran Out of Food in the Last Year: Never true   Transportation Needs: No Transportation Needs (2024)    Received from [facility]    PRAPARE - Transportation      Lack of Transportation (Medical): No      Lack of Transportation (Non-Medical): No   Physical Activity: Insufficiently Active (2024)    Received from [facility]    Exercise Vital Sign      Days of Exercise per Week: 7 days      Minutes of Exercise per Session: 10 min   Stress: Stress Concern Present (3/20/2024)    [facility] of " "Occupational Health - Occupational Stress Questionnaire      Feeling of Stress : To some extent   Social Connections: Unknown (3/20/2024)    Social Connection and Isolation Panel [NHANES]      Frequency of Social Gatherings with Friends and Family: Twice a week   Housing Stability: Low Risk  (4/17/2024)    Received from [facility]    Housing Stability      What is your living situation today?: I have a steady place to live         FAMILY HISTORY:  No family history on file.     PHYSICAL EXAM:   Constitutional: /82   Pulse 64   Ht 1.689 m (5' 6.5\")   Wt 102.1 kg (225 lb)   SpO2 95%   BMI 35.77 kg/m       Mental Status: A & O in no acute distress.  Affect is appropriate.  Speech is fluent.  Recent and remote memory are intact.  Attention span and concentration are normal.     Motor:  Normal bulk and tone all muscle groups of upper and lower extremities.    Strength: 5/5 x 4     Sensory: Sensation intact bilaterally to light touch. No vibratory loss.      Coordination; Heel/toe gait intact.  Normal gait and station.     Reflexes;  knee/ ankle jerk intact 2+    IMAGING:  I personally reviewed all radiographic images         Cc:   Nupur Torres             Again, thank you for allowing me to participate in the care of your patient.        Sincerely,        Kayla Bradley MD  "

## 2024-06-06 NOTE — PROGRESS NOTES
NEUROSURGERY CONSULTATION NOTE      CONSULTATION ASSESSMENT AND PLAN:    53 yo male who presents with low back and bilateral lower extremity pain, urge to defecate, feet numbness and tingling and occasional weakness in legs and urinary incontinence.  Lumbar x-ray shows mild anterior wedging of T11 and L5 pars defects with 1 mm retrolisthesis lumbar 1-2 and lumbar 2 3 without any significant instability.  MRI lumbar spine shows severe right and moderate left foraminal narrowing at lumbar 4-5 and moderate left foraminal narrowing at lumbar 3 5 and 4 and otherwise mild to no significant narrowing elsewhere.  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  Pain not likely associated with unilateral S3 cyst. Not well visualized on past films at this region. Chronicity to cyst unknown. Recommend repeat MRI in 6 months to assess for interval change. His pain is his largest complaint. Recommend EMG to rule out neuropathy. Also could  consider bilateral lumbar 4-5 TFESI as SNRI given pain is described as posterior legs and he does have stenosis on MRI at lumbar 4-5 but that would cause anterior leg pain.     Kayla Bradley MD      HISTORY OF PRESENTING ILLNESS:    53 yo male who presents with low back and bilateral lower extremity pain. Years of symptoms. Past injections with temporary relief. Pain travels mostly down posterior leg to his knee and at times down to his foot. He feels a constant urge to defecate. Feet have numbness and tingling only. Legs sometimes feel weak. Occasional urinary incontinence. Particularly with flexion. Has arthritis in his hips as well. Some different feeling in groin and buttocks.     [facility]. [place] aggravated his symptoms.  Also has tried tylenol, ibuprofen, oral steroids,amitriptyline and gabapentin. No relief significantly with any of these.   S/p bilateral lumbar 3-5 RFA in 2022 and in  "2023. That significantly improved his low back pain.     No past medical history on file.    Past Surgical History:   Procedure Laterality Date    COLONOSCOPY N/A 1/23/2024    Procedure: COLONOSCOPY, FLEXIBLE, WITH LESION REMOVAL USING SNARE;  Surgeon: Mason Herrera MD;  Location: [facility]    IR LUMBAR PUNCTURE  6/23/2021    IR LUMBAR PUNCTURE  8/4/2021    IR LUMBAR PUNCTURE  1/6/2022       REVIEW OF SYSTEMS:  See ROS form under media     MEDICATIONS:    Current Outpatient Medications   Medication Sig Dispense Refill    amitriptyline (ELAVIL) 10 MG tablet Take 1 tablet (10 mg) by mouth at bedtime 90 tablet 3    folic acid (FOLVITE) 1 MG tablet Take 1 mg by mouth      gabapentin (NEURONTIN) 300 MG capsule Take 1 capsule (300 mg) by mouth 2 times daily 180 capsule 3    methotrexate sodium, PF, 50 MG/2ML SOLN injection Inject 15 mg Subcutaneous      Needle, Disp, (B-D HYPODERMIC NEEDLE) 18G X 1-1/2\" MISC 1 each once a week 12 each 3    Probiotic Product (FORTIFY DAILY PROBIOTIC PO)       Syringe/Needle, Disp, 23G X 1-1/2\" 3 ML MISC 1 each once a week 12 each 3    tamsulosin (FLOMAX) 0.4 MG capsule Take 1 capsule (0.4 mg) by mouth daily 90 capsule 3    testosterone cypionate (DEPOTESTOSTERONE) 100 MG/ML injection Inject 1 mL (100 mg) into the muscle once a week 4 mL 5    valACYclovir (VALTREX) 1000 mg tablet Take 1 tablet (1,000 mg) by mouth daily 5 tablet 5    valACYclovir (VALTREX) 500 MG tablet Take 1 tablet (500 mg) by mouth daily 90 tablet 3    etanercept (ENBREL SURECLICK) 50 MG/ML autoinjector Inject 50 mg Subcutaneous every 7 days (Patient not taking: Reported on 5/10/2024) 4 mL 2    Filter Needles (BD FILTER NEEDLE) 18G X 1-1/2\" MISC USE ONCE WEEKLY WITH TESTOSTERONE (Patient not taking: Reported on 5/10/2024) 12 each 3    Needle, Disp, (B-D HYPODERMIC NEEDLE) 22G X 1\" MISC 1 each once a week (Patient not taking: Reported on 5/10/2024) 12 each 3    Syringe Luer Slip (B-D SYRINGE LUER-MARIALUISA) 3 ML MISC 1 " each once a week (Patient not taking: Reported on 5/10/2024) 12 each 3         ALLERGIES/SENSITIVITIES:     Allergies   Allergen Reactions    Bee Venom Other (See Comments)     Hallucinates, no anaphylaxis  Hallucinates, no anaphylaxis  Hallucinates, no anaphylaxis         PERTINENT SOCIAL HISTORY:   Social History     Socioeconomic History    Marital status:    Tobacco Use    Smoking status: Former     Current packs/day: 0.00     Types: Cigarettes     Quit date: 2002     Years since quittin.4    Smokeless tobacco: Never   Vaping Use    Vaping status: Never Used   Substance and Sexual Activity    Alcohol use: Not Currently    Drug use: Yes     Types: Marijuana     Comment: has marijuana card     Social Determinants of Health     Financial Resource Strain: Low Risk  (3/20/2024)    Financial Resource Strain     Within the past 12 months, have you or your family members you live with been unable to get utilities (heat, electricity) when it was really needed?: No   Food Insecurity: No Food Insecurity (2024)    Received from [facility]    Hunger Vital Sign     Worried About Running Out of Food in the Last Year: Never true     Ran Out of Food in the Last Year: Never true   Transportation Needs: No Transportation Needs (2024)    Received from [facility]    PRAPARE - Transportation     Lack of Transportation (Medical): No     Lack of Transportation (Non-Medical): No   Physical Activity: Insufficiently Active (2024)    Received from [facility]    Exercise Vital Sign     Days of Exercise per Week: 7 days     Minutes of Exercise per Session: 10 min   Stress: Stress Concern Present (3/20/2024)    [employer] [place] [facility] Occupational Health - Occupational Stress Questionnaire     Feeling of Stress : To some extent   Social Connections: Unknown (3/20/2024)    Social Connection and Isolation Panel [NHANES]     Frequency of Social Gatherings with Friends and Family: Twice a week   Housing Stability:  "Low Risk  (4/17/2024)    Received from [facility]    Housing Stability     What is your living situation today?: I have a steady place to live         FAMILY HISTORY:  No family history on file.     PHYSICAL EXAM:   Constitutional: /82   Pulse 64   Ht 1.689 m (5' 6.5\")   Wt 102.1 kg (225 lb)   SpO2 95%   BMI 35.77 kg/m       Mental Status: A & O in no acute distress.  Affect is appropriate.  Speech is fluent.  Recent and remote memory are intact.  Attention span and concentration are normal.     Motor:  Normal bulk and tone all muscle groups of upper and lower extremities.    Strength: 5/5 x 4     Sensory: Sensation intact bilaterally to light touch. No vibratory loss.      Coordination; Heel/toe gait intact.  Normal gait and station.     Reflexes;  knee/ ankle jerk intact 2+    IMAGING:  I personally reviewed all radiographic images         Cc:   Nupur Torres           "

## 2024-06-06 NOTE — NURSING NOTE
"Robson Galeano is a 54 year old male who presents for:  Chief Complaint   Patient presents with    Consult     Low back pain  Mid back pain  Left leg pain  Left outer thigh pain  Previous ablations and PT for pain - minimal relief          Initial Vitals:  /82   Pulse 64   Ht 5' 6.5\" (1.689 m)   Wt 225 lb (102.1 kg)   SpO2 95%   BMI 35.77 kg/m   Estimated body mass index is 35.77 kg/m  as calculated from the following:    Height as of this encounter: 5' 6.5\" (1.689 m).    Weight as of this encounter: 225 lb (102.1 kg).. Body surface area is 2.19 meters squared. BP completed using cuff size: regular  Mild Pain (3)    Oswestry Disability Index (FRIDA   Pablo Lucia 1980, All rights reserved. Used with permission)    Section 1 - Pain intensity: I have no pain at the moment.  Section 2 - Personal care (washing, dressing, etc.) : I can look after myself normally but it is very painful.  Section 3 - Lifting: Pain prevents me from lifting heavy weights off the floor but I can manage if they are conveniently positioned, e.g. on a table.  Section 4 - Walking: Pain prevents me from walking more than a quarter of a mile.  Section 5 - Sitting: Pain prevents me from sitting for more than 1 hour.  Section 6 - Standing: Pain prevents me from standing for more than 1 hour.  Section 7 - Sleeping: Because of pain I have less than 4 hours sleep.  Section 8 - Sex life (if applicable): My sex life is severely restricted by pain.  Section 9 - Social life: Pain has restricted my social life and I do not go out as often.  Section 10 - Traveling: Pain is bad but I am able to manage trips over two hours.  Sum: 20  Count: 10  Oswestry Score (%): 40 %       Murtaza Wang  "

## 2024-06-08 ENCOUNTER — HEALTH MAINTENANCE LETTER (OUTPATIENT)
Age: 54
End: 2024-06-08

## 2024-06-17 ENCOUNTER — LAB (OUTPATIENT)
Dept: LAB | Facility: CLINIC | Age: 54
End: 2024-06-17
Payer: COMMERCIAL

## 2024-06-17 ENCOUNTER — OFFICE VISIT (OUTPATIENT)
Dept: PHYSICAL MEDICINE AND REHAB | Facility: CLINIC | Age: 54
End: 2024-06-17
Attending: SURGERY
Payer: COMMERCIAL

## 2024-06-17 DIAGNOSIS — M05.9 SEROPOSITIVE RHEUMATOID ARTHRITIS (H): ICD-10-CM

## 2024-06-17 DIAGNOSIS — G96.191 TARLOV CYST: ICD-10-CM

## 2024-06-17 DIAGNOSIS — Z79.899 HIGH RISK MEDICATION USE: ICD-10-CM

## 2024-06-17 DIAGNOSIS — M54.16 LUMBAR RADICULAR PAIN: Primary | ICD-10-CM

## 2024-06-17 LAB
ALT SERPL W P-5'-P-CCNC: 42 U/L (ref 0–70)
AST SERPL W P-5'-P-CCNC: 28 U/L (ref 0–45)
CREAT SERPL-MCNC: 1.14 MG/DL (ref 0.67–1.17)
EGFRCR SERPLBLD CKD-EPI 2021: 75 ML/MIN/1.73M2
ERYTHROCYTE [DISTWIDTH] IN BLOOD BY AUTOMATED COUNT: 13.7 % (ref 10–15)
HCT VFR BLD AUTO: 47.1 % (ref 40–53)
HGB BLD-MCNC: 16 G/DL (ref 13.3–17.7)
MCH RBC QN AUTO: 30.6 PG (ref 26.5–33)
MCHC RBC AUTO-ENTMCNC: 34 G/DL (ref 31.5–36.5)
MCV RBC AUTO: 90 FL (ref 78–100)
PLATELET # BLD AUTO: 180 10E3/UL (ref 150–450)
RBC # BLD AUTO: 5.23 10E6/UL (ref 4.4–5.9)
WBC # BLD AUTO: 6.1 10E3/UL (ref 4–11)

## 2024-06-17 PROCEDURE — 84450 TRANSFERASE (AST) (SGOT): CPT

## 2024-06-17 PROCEDURE — 85027 COMPLETE CBC AUTOMATED: CPT

## 2024-06-17 PROCEDURE — 36415 COLL VENOUS BLD VENIPUNCTURE: CPT

## 2024-06-17 PROCEDURE — 84460 ALANINE AMINO (ALT) (SGPT): CPT

## 2024-06-17 PROCEDURE — 82565 ASSAY OF CREATININE: CPT

## 2024-06-17 PROCEDURE — 95886 MUSC TEST DONE W/N TEST COMP: CPT | Performed by: PHYSICAL MEDICINE & REHABILITATION

## 2024-06-17 PROCEDURE — 95910 NRV CNDJ TEST 7-8 STUDIES: CPT | Performed by: PHYSICAL MEDICINE & REHABILITATION

## 2024-06-17 NOTE — PATIENT INSTRUCTIONS
Thank you for choosing the [facility] for your EMG testing.    The ordering provider will receive your final EMG results within the next few days.  Please follow up with your provider for the results and further treatment recommendations.

## 2024-06-17 NOTE — LETTER
6/17/2024      Robson Galeano  [address] [facility] [address]      Dear Colleague,    Thank you for referring your patient, Robson Galeano, to the [facility] NEUROSURGERY. Please see a copy of my visit note below.    [facility]  [address] [place] [id number]  Office: [phone] Fax: [phone]    Electromyography and Nerve Conduction Study Report        Indication: Patient presents at the request of Dr. Kayla Bradley for a bilateral lower extremity EMG.  He has low back pain with left greater than right lower extremity pain in the back of the leg to the knee no numbness tingling or weakness.  On exam, he has normal sensation to light touch of the lower extremities, 1+ patellar and Achilles reflexes bilaterally, normal muscle strength throughout the major muscle groups of the bilateral lower extremities.        Pt Exam Discussion (Communication Barriers):  Electromyography and nerve conduction testing, including associated discomfort, risks, benefits, and alternatives was discussed with the patient prior to the procedure.  No learning/ communication barriers; patient verbalized understanding of procedure.  Informed consent was obtained.           Pt Assessment:  Testing was successfully completed; patient tolerated testing well.       Blood Thinners: None Skin Temperature: Warmed 33.2                   EMG/NCS  results:     Nerve Conduction Studies  Motor Sites      Segment Distal Latency Neg. Amp CV F-Latency F-Estimate Comment   Site  (ms) (mV) (m/s) (ms) (ms)    Left Fibular (EDB) Motor   Ankle Ankle-EDB 3.8 5.5       Knee Knee-Ankle 12.5 5.6 42      Right Fibular (EDB) Motor   Ankle Ankle-EDB 3.6 9.6       Knee Knee-Ankle 11.6 7.9 47      Left Tibial (AH) Motor   Ankle Ankle-AH 3.1 7.8       Knee Knee-Ankle 11.5 4.9 52      Right Tibial (AH) Motor   Ankle Ankle-AH 3.0 9.2       Knee Knee-Ankle 11.4 6.8 49        Sensory  Sites      Onset Lat Peak Lat Amp CV Comment   Site (ms) (ms) ( V) (m/s)    Left Sural Sensory   B-Ankle 2.9 3.6 8 -    Right Sural Sensory   B-Ankle 2.6 3.0 12 -      H-Reflex Sites      M-Lat H Lat H Neg Amp   Site (ms) (ms) (mV)   Left Tibial (Soleus) H-Reflex   Pop Fossa 6.4 33.1 0.62   Right Tibial (Soleus) H-Reflex   Pop Fossa 6.3 33.9 0.79     H-Reflex Sites      Lt. H Lat Rt. H Lat L-R H Lat L-R H Neg Amp   Site (ms) (ms) (ms) Norm (mV)   Tibial (Soleus) H-Reflex   Pop Fossa 33.1 33.9 0.80  < 3.0 0.17       NCS Waveforms:    Motor                Sensory         H-Reflex           Electromyography     Side Muscle Nerve Root Ins Act Fibs Psw Fasc Recrt Dur Amp Poly Comment   Right AntTibialis Dp Br Fibular L4-5 Nml Nml Nml Nml Nml Nml Nml 0    Right Gastroc Tibial S1-2 Nml Nml Nml Nml Nml Nml Nml 0    Right Fibularis Long Sup Br Fibular L5-S1 Nml Nml Nml Nml Nml Nml Nml 0    Right VastusLat Femoral L2-4 Nml Nml Nml Nml Nml Nml Nml 0    Right TensorFascLat SupGluteal L4-5, S1 Nml Nml Nml Nml Nml Nml Nml 0    Left AntTibialis Dp Br Fibular L4-5 Nml Nml Nml Nml Nml Nml Nml 0    Left Gastroc Tibial S1-2 Nml Nml Nml Nml Nml Nml Nml 0    Left Fibularis Long Sup Br Fibular L5-S1 Nml Nml Nml Nml Nml Nml Nml 0    Left VastusLat Femoral L2-4 Nml Nml Nml Nml Nml Nml Nml 0    Left RectFemoris Femoral L2-4 Nml Nml Nml Nml Nml Nml Nml 0            Comment NCS: Normal study  1.  Normal nerve conduction studies bilateral lower extremities.  This includes normal bilateral sural SNAPs, peroneal and tibial CMAP's, and symmetric tibial H reflexes.     Comment EMG: Normal study  1.  Normal needle EMG bilateral lower extremities.     Interpretation: Normal study    1. There is no electrodiagnostic evidence of lumbosacral radiculopathy, lumbosacral plexopathy, or focal neuropathy in the bilateral lower extremities.     The testing was completed in its entirety by the physician.      It was our pleasure caring for your patient today,  if there any questions or concerns please do not hesitate to contact us.      Again, thank you for allowing me to participate in the care of your patient.        Sincerely,        Wes Bal, DO

## 2024-06-17 NOTE — PROGRESS NOTES
[facility]  [address] [place] [address] [address]  [place] [address]  Office: [phone] Fax: [phone]    Electromyography and Nerve Conduction Study Report        Indication: Patient presents at the request of Dr. Kayla Bradley for a bilateral lower extremity EMG.  He has low back pain with left greater than right lower extremity pain in the back of the leg to the knee no numbness tingling or weakness.  On exam, he has normal sensation to light touch of the lower extremities, 1+ patellar and Achilles reflexes bilaterally, normal muscle strength throughout the major muscle groups of the bilateral lower extremities.        Pt Exam Discussion (Communication Barriers):  Electromyography and nerve conduction testing, including associated discomfort, risks, benefits, and alternatives was discussed with the patient prior to the procedure.  No learning/ communication barriers; patient verbalized understanding of procedure.  Informed consent was obtained.           Pt Assessment:  Testing was successfully completed; patient tolerated testing well.       Blood Thinners: None Skin Temperature: Warmed 33.2                   EMG/NCS  results:     Nerve Conduction Studies  Motor Sites      Segment Distal Latency Neg. Amp CV F-Latency F-Estimate Comment   Site  (ms) (mV) (m/s) (ms) (ms)    Left Fibular (EDB) Motor   Ankle Ankle-EDB 3.8 5.5       Knee Knee-Ankle 12.5 5.6 42      Right Fibular (EDB) Motor   Ankle Ankle-EDB 3.6 9.6       Knee Knee-Ankle 11.6 7.9 47      Left Tibial (AH) Motor   Ankle Ankle-AH 3.1 7.8       Knee Knee-Ankle 11.5 4.9 52      Right Tibial (AH) Motor   Ankle Ankle-AH 3.0 9.2       Knee Knee-Ankle 11.4 6.8 49        Sensory Sites      Onset Lat Peak Lat Amp CV Comment   Site (ms) (ms) ( V) (m/s)    Left Sural Sensory   B-Ankle 2.9 3.6 8 -    Right Sural Sensory   B-Ankle 2.6 3.0 12 -      H-Reflex Sites      M-Lat H Lat H Neg Amp   Site (ms) (ms) (mV)   Left Tibial  (Soleus) H-Reflex   Pop Fossa 6.4 33.1 0.62   Right Tibial (Soleus) H-Reflex   Pop Fossa 6.3 33.9 0.79     H-Reflex Sites      Lt. H Lat Rt. H Lat L-R H Lat L-R H Neg Amp   Site (ms) (ms) (ms) Norm (mV)   Tibial (Soleus) H-Reflex   Pop Fossa 33.1 33.9 0.80  < 3.0 0.17       NCS Waveforms:    Motor                Sensory         H-Reflex           Electromyography     Side Muscle Nerve Root Ins Act Fibs Psw Fasc Recrt Dur Amp Poly Comment   Right AntTibialis Dp Br Fibular L4-5 Nml Nml Nml Nml Nml Nml Nml 0    Right Gastroc Tibial S1-2 Nml Nml Nml Nml Nml Nml Nml 0    Right Fibularis Long Sup Br Fibular L5-S1 Nml Nml Nml Nml Nml Nml Nml 0    Right VastusLat Femoral L2-4 Nml Nml Nml Nml Nml Nml Nml 0    Right TensorFascLat SupGluteal L4-5, S1 Nml Nml Nml Nml Nml Nml Nml 0    Left AntTibialis Dp Br Fibular L4-5 Nml Nml Nml Nml Nml Nml Nml 0    Left Gastroc Tibial S1-2 Nml Nml Nml Nml Nml Nml Nml 0    Left Fibularis Long Sup Br Fibular L5-S1 Nml Nml Nml Nml Nml Nml Nml 0    Left VastusLat Femoral L2-4 Nml Nml Nml Nml Nml Nml Nml 0    Left RectFemoris Femoral L2-4 Nml Nml Nml Nml Nml Nml Nml 0            Comment NCS: Normal study  1.  Normal nerve conduction studies bilateral lower extremities.  This includes normal bilateral sural SNAPs, peroneal and tibial CMAP's, and symmetric tibial H reflexes.     Comment EMG: Normal study  1.  Normal needle EMG bilateral lower extremities.     Interpretation: Normal study    1. There is no electrodiagnostic evidence of lumbosacral radiculopathy, lumbosacral plexopathy, or focal neuropathy in the bilateral lower extremities.     The testing was completed in its entirety by the physician.      It was our pleasure caring for your patient today, if there any questions or concerns please do not hesitate to contact us.

## 2024-07-02 ENCOUNTER — OFFICE VISIT (OUTPATIENT)
Dept: DERMATOLOGY | Facility: CLINIC | Age: 54
End: 2024-07-02
Attending: NURSE PRACTITIONER
Payer: COMMERCIAL

## 2024-07-02 DIAGNOSIS — M05.9 SEROPOSITIVE RHEUMATOID ARTHRITIS (H): ICD-10-CM

## 2024-07-02 DIAGNOSIS — D18.01 ANGIOMA OF SKIN: ICD-10-CM

## 2024-07-02 DIAGNOSIS — L81.4 LENTIGO: ICD-10-CM

## 2024-07-02 DIAGNOSIS — D23.9 DERMAL NEVUS: Primary | ICD-10-CM

## 2024-07-02 DIAGNOSIS — L82.1 SEBORRHEIC KERATOSES: ICD-10-CM

## 2024-07-02 PROCEDURE — 99243 OFF/OP CNSLTJ NEW/EST LOW 30: CPT | Performed by: DERMATOLOGY

## 2024-07-02 NOTE — PROGRESS NOTES
Robson Galeano , a 54 year old year old male patient, I was asked to see by OLAYINKA Curiel for spots on skin.  Patient has no other skin complaints today.  Remainder of the HPI, Meds, PMH, Allergies, FH, and SH was reviewed in chart.    History reviewed. No pertinent past medical history.    Past Surgical History:   Procedure Laterality Date    COLONOSCOPY N/A 2024    Procedure: COLONOSCOPY, FLEXIBLE, WITH LESION REMOVAL USING SNARE;  Surgeon: Mason Herrera MD;  Location: [facility]    IR LUMBAR PUNCTURE  2021    IR LUMBAR PUNCTURE  2021    IR LUMBAR PUNCTURE  2022        History reviewed. No pertinent family history.    Social History     Socioeconomic History    Marital status:      Spouse name: Not on file    Number of children: Not on file    Years of education: Not on file    Highest education level: Not on file   Occupational History    Not on file   Tobacco Use    Smoking status: Former     Current packs/day: 0.00     Types: Cigarettes     Quit date: 2002     Years since quittin.5    Smokeless tobacco: Never   Vaping Use    Vaping status: Never Used   Substance and Sexual Activity    Alcohol use: Not Currently    Drug use: Yes     Types: Marijuana     Comment: has marijuana card    Sexual activity: Not on file   Other Topics Concern    Not on file   Social History Narrative    Not on file     Social Determinants of Health     Financial Resource Strain: Low Risk  (3/20/2024)    Financial Resource Strain     Within the past 12 months, have you or your family members you live with been unable to get utilities (heat, electricity) when it was really needed?: No   Food Insecurity: No Food Insecurity (2024)    Received from [facility]    Hunger Vital Sign     Worried About Running Out of Food in the Last Year: Never true     Ran Out of Food in the Last Year: Never true   Transportation Needs: No Transportation Needs (2024)    Received from [facility]    PRAPARE -  "Transportation     Lack of Transportation (Medical): No     Lack of Transportation (Non-Medical): No   Physical Activity: Insufficiently Active (4/17/2024)    Received from [facility]    Exercise Vital Sign     Days of Exercise per Week: 7 days     Minutes of Exercise per Session: 10 min   Stress: Stress Concern Present (3/20/2024)    [place] [place] [employer] - Occupational Stress Questionnaire     Feeling of Stress : To some extent   Social Connections: Unknown (3/20/2024)    Social Connection and Isolation Panel [NHANES]     Frequency of Communication with Friends and Family: Not on file     Frequency of Social Gatherings with Friends and Family: Twice a week     Attends Jain Services: Not on file     Active Member of Clubs or Organizations: Not on file     Attends Club or Organization Meetings: Not on file     Marital Status: Not on file   Interpersonal Safety: Not on file   Housing Stability: Low Risk  (4/17/2024)    Received from [facility]    Housing Stability     What is your living situation today?: I have a steady place to live       Outpatient Encounter Medications as of 7/2/2024   Medication Sig Dispense Refill    amitriptyline (ELAVIL) 10 MG tablet Take 1 tablet (10 mg) by mouth at bedtime 90 tablet 3    etanercept (ENBREL SURECLICK) 50 MG/ML autoinjector Inject 50 mg Subcutaneous every 7 days (Patient not taking: Reported on 5/10/2024) 4 mL 2    Filter Needles (BD FILTER NEEDLE) 18G X 1-1/2\" MISC USE ONCE WEEKLY WITH TESTOSTERONE (Patient not taking: Reported on 5/10/2024) 12 each 3    folic acid (FOLVITE) 1 MG tablet Take 1 mg by mouth      gabapentin (NEURONTIN) 300 MG capsule Take 1 capsule (300 mg) by mouth 2 times daily 180 capsule 3    methotrexate sodium, PF, 50 MG/2ML SOLN injection Inject 15 mg Subcutaneous      Needle, Disp, (B-D HYPODERMIC NEEDLE) 18G X 1-1/2\" MISC 1 each once a week 12 each 3    Needle, Disp, (B-D HYPODERMIC NEEDLE) 22G X 1\" MISC 1 each once a week " "(Patient not taking: Reported on 5/10/2024) 12 each 3    Probiotic Product (FORTIFY DAILY PROBIOTIC PO)       Syringe Luer Slip (B-D SYRINGE LUER-MARIALUISA) 3 ML MISC 1 each once a week (Patient not taking: Reported on 5/10/2024) 12 each 3    Syringe/Needle, Disp, 23G X 1-1/2\" 3 ML MISC 1 each once a week 12 each 3    tamsulosin (FLOMAX) 0.4 MG capsule Take 1 capsule (0.4 mg) by mouth daily 90 capsule 3    testosterone cypionate (DEPOTESTOSTERONE) 100 MG/ML injection Inject 1 mL (100 mg) into the muscle once a week 4 mL 5    valACYclovir (VALTREX) 1000 mg tablet Take 1 tablet (1,000 mg) by mouth daily 5 tablet 5    valACYclovir (VALTREX) 500 MG tablet Take 1 tablet (500 mg) by mouth daily 90 tablet 3     No facility-administered encounter medications on file as of 7/2/2024.             Review Of Systems  Skin: As above  Eyes: negative  Ears/Nose/Throat: negative  Respiratory: No shortness of breath, dyspnea on exertion, cough, or hemoptysis  Cardiovascular: negative  Gastrointestinal: negative  Genitourinary: negative  Musculoskeletal: negative  Neurologic: negative  Psychiatric: negative  Hematologic/Lymphatic/Immunologic: negative  Endocrine: negative      O:   NAD, WDWN, Alert & Oriented, Mood & Affect wnl, Vitals stable   General appearance herbie ii   Vitals stable   Alert, oriented and in no acute distress      Stuck on papules and brown macules on trunk and ext    Red papules on trunk   Flesh colored papules on trunk      The remainder of the full exam was normal; the following areas were examined:  conjunctiva/lids, , neck, peripheral vascular system, abdomen, lymph nodes, digits/nails, eccrine and apocrine glands, scalp/hair, face, neck, chest, abdomen, buttocks, back, RUE, LUE, RLE, LLE       Eyes: Conjunctivae/lids:Normal     ENT: Lips, mucosa: normal    MSK:Normal    Cardiovascular: peripheral edema none    Pulm: Breathing Normal    Lymph Nodes: No Head and Neck Lymphadenopathy     Neuro/Psych: " Orientation:Normal; Mood/Affect:Normal      A/P:  1. Seborrheic keratosis, lentigo, angioma, dermal nevus  It was a pleasure speaking to Robson Galeano today.  Previous clinic  notes and pertinent laboratory tests were reviewed prior to Robson Galeano's visit.  Nature and genetics of benign skin lesions dicussed with patient.  Signs and Symptoms of skin cancer discussed with patient.  Patient encouraged to perform monthly skin exams.  UV precautions reviewed with patient.  Return to clinic 12 months

## 2024-07-02 NOTE — LETTER
2024      Robson Galeano  [address] [address] [facility] [address] [address]      Dear Colleague,    Thank you for referring your patient, Robson Galeano, to the [facility]. Please see a copy of my visit note below.    Robson Galeano , a 54 year old year old male patient, I was asked to see by OLAYINKA Curiel for spots on skin.  Patient has no other skin complaints today.  Remainder of the HPI, Meds, PMH, Allergies, FH, and SH was reviewed in chart.    History reviewed. No pertinent past medical history.    Past Surgical History:   Procedure Laterality Date     COLONOSCOPY N/A 2024    Procedure: COLONOSCOPY, FLEXIBLE, WITH LESION REMOVAL USING SNARE;  Surgeon: Mason Herrera MD;  Location: [facility]     IR LUMBAR PUNCTURE  2021     IR LUMBAR PUNCTURE  2021     IR LUMBAR PUNCTURE  2022        History reviewed. No pertinent family history.    Social History     Socioeconomic History     Marital status:      Spouse name: Not on file     Number of children: Not on file     Years of education: Not on file     Highest education level: Not on file   Occupational History     Not on file   Tobacco Use     Smoking status: Former     Current packs/day: 0.00     Types: Cigarettes     Quit date: 2002     Years since quittin.5     Smokeless tobacco: Never   Vaping Use     Vaping status: Never Used   Substance and Sexual Activity     Alcohol use: Not Currently     Drug use: Yes     Types: Marijuana     Comment: has marijuana card     Sexual activity: Not on file   Other Topics Concern     Not on file   Social History Narrative     Not on file     Social Determinants of Health     Financial Resource Strain: Low Risk  (3/20/2024)    Financial Resource Strain      Within the past 12 months, have you or your family members you live with been unable to get utilities (heat, electricity) when it was really needed?: No   Food Insecurity: No Food Insecurity (2024)    Received from [facility]    Hunger Vital Sign      Worried About Running Out of Food in the Last Year: Never true      Ran Out of Food in the Last Year: Never true   Transportation Needs: No Transportation Needs (4/17/2024)    Received from [facility]    PRAPARE - Transportation      Lack of Transportation (Medical): No      Lack of Transportation (Non-Medical): No   Physical Activity: Insufficiently Active (4/17/2024)    Received from [facility]    Exercise Vital Sign      Days of Exercise per Week: 7 days      Minutes of Exercise per Session: 10 min   Stress: Stress Concern Present (3/20/2024)    [employer] [place] [employer] - Occupational Stress Questionnaire      Feeling of Stress : To some extent   Social Connections: Unknown (3/20/2024)    Social Connection and Isolation Panel [NHANES]      Frequency of Communication with Friends and Family: Not on file      Frequency of Social Gatherings with Friends and Family: Twice a week      Attends [employer] Services: Not on file      Active Member of Clubs or Organizations: Not on file      Attends Club or Organization Meetings: Not on file      Marital Status: Not on file   Interpersonal Safety: Not on file   Housing Stability: Low Risk  (4/17/2024)    Received from [facility]    Housing Stability      What is your living situation today?: I have a steady place to live       Outpatient Encounter Medications as of 7/2/2024   Medication Sig Dispense Refill     amitriptyline (ELAVIL) 10 MG tablet Take 1 tablet (10 mg) by mouth at bedtime 90 tablet 3     etanercept (ENBREL SURECLICK) 50 MG/ML autoinjector Inject 50 mg Subcutaneous every 7 days (Patient not taking: Reported on 5/10/2024) 4 mL 2     Filter Needles (BD FILTER NEEDLE) 18G X 1-1/2\" MISC USE ONCE WEEKLY WITH TESTOSTERONE (Patient not taking: Reported on 5/10/2024) 12 each 3     folic acid (FOLVITE) 1 MG tablet Take 1 mg by mouth       gabapentin (NEURONTIN) 300 MG capsule Take 1 capsule (300 mg) by mouth 2 times " "daily 180 capsule 3     methotrexate sodium, PF, 50 MG/2ML SOLN injection Inject 15 mg Subcutaneous       Needle, Disp, (B-D HYPODERMIC NEEDLE) 18G X 1-1/2\" MISC 1 each once a week 12 each 3     Needle, Disp, (B-D HYPODERMIC NEEDLE) 22G X 1\" MISC 1 each once a week (Patient not taking: Reported on 5/10/2024) 12 each 3     Probiotic Product (FORTIFY DAILY PROBIOTIC PO)        Syringe Luer Slip (B-D SYRINGE LUER-MARIALUISA) 3 ML MISC 1 each once a week (Patient not taking: Reported on 5/10/2024) 12 each 3     Syringe/Needle, Disp, 23G X 1-1/2\" 3 ML MISC 1 each once a week 12 each 3     tamsulosin (FLOMAX) 0.4 MG capsule Take 1 capsule (0.4 mg) by mouth daily 90 capsule 3     testosterone cypionate (DEPOTESTOSTERONE) 100 MG/ML injection Inject 1 mL (100 mg) into the muscle once a week 4 mL 5     valACYclovir (VALTREX) 1000 mg tablet Take 1 tablet (1,000 mg) by mouth daily 5 tablet 5     valACYclovir (VALTREX) 500 MG tablet Take 1 tablet (500 mg) by mouth daily 90 tablet 3     No facility-administered encounter medications on file as of 7/2/2024.             Review Of Systems  Skin: As above  Eyes: negative  Ears/Nose/Throat: negative  Respiratory: No shortness of breath, dyspnea on exertion, cough, or hemoptysis  Cardiovascular: negative  Gastrointestinal: negative  Genitourinary: negative  Musculoskeletal: negative  Neurologic: negative  Psychiatric: negative  Hematologic/Lymphatic/Immunologic: negative  Endocrine: negative      O:   NAD, WDWN, Alert & Oriented, Mood & Affect wnl, Vitals stable   General appearance herbie ii   Vitals stable   Alert, oriented and in no acute distress      Stuck on papules and brown macules on trunk and ext    Red papules on trunk   Flesh colored papules on trunk      The remainder of the full exam was normal; the following areas were examined:  conjunctiva/lids, , neck, peripheral vascular system, abdomen, lymph nodes, digits/nails, eccrine and apocrine glands, scalp/hair, face, neck, chest, " abdomen, buttocks, back, RUE, LUE, RLE, LLE       Eyes: Conjunctivae/lids:Normal     ENT: Lips, mucosa: normal    MSK:Normal    Cardiovascular: peripheral edema none    Pulm: Breathing Normal    Lymph Nodes: No Head and Neck Lymphadenopathy     Neuro/Psych: Orientation:Normal; Mood/Affect:Normal      A/P:  1. Seborrheic keratosis, lentigo, angioma, dermal nevus  It was a pleasure speaking to Robson Galeano today.  Previous clinic  notes and pertinent laboratory tests were reviewed prior to Robson Galeano's visit.  Nature and genetics of benign skin lesions dicussed with patient.  Signs and Symptoms of skin cancer discussed with patient.  Patient encouraged to perform monthly skin exams.  UV precautions reviewed with patient.  Return to clinic 12 months      Again, thank you for allowing me to participate in the care of your patient.        Sincerely,        Devante Alanis MD

## 2024-07-05 ENCOUNTER — OFFICE VISIT (OUTPATIENT)
Dept: PULMONOLOGY | Facility: CLINIC | Age: 54
End: 2024-07-05
Payer: COMMERCIAL

## 2024-07-05 VITALS
WEIGHT: 219 LBS | OXYGEN SATURATION: 97 % | DIASTOLIC BLOOD PRESSURE: 91 MMHG | HEIGHT: 67 IN | SYSTOLIC BLOOD PRESSURE: 137 MMHG | BODY MASS INDEX: 34.37 KG/M2 | HEART RATE: 60 BPM

## 2024-07-05 DIAGNOSIS — J43.9 PULMONARY EMPHYSEMA (H): ICD-10-CM

## 2024-07-05 DIAGNOSIS — J43.9 PULMONARY EMPHYSEMA, UNSPECIFIED EMPHYSEMA TYPE (H): ICD-10-CM

## 2024-07-05 LAB — HGB BLD-MCNC: 15.9 G/DL

## 2024-07-05 PROCEDURE — 85018 HEMOGLOBIN: CPT | Mod: QW | Performed by: INTERNAL MEDICINE

## 2024-07-05 PROCEDURE — 94729 DIFFUSING CAPACITY: CPT | Mod: 26 | Performed by: INTERNAL MEDICINE

## 2024-07-05 PROCEDURE — 94726 PLETHYSMOGRAPHY LUNG VOLUMES: CPT | Mod: 26 | Performed by: INTERNAL MEDICINE

## 2024-07-05 PROCEDURE — 94060 EVALUATION OF WHEEZING: CPT | Mod: 26 | Performed by: INTERNAL MEDICINE

## 2024-07-05 PROCEDURE — 99203 OFFICE O/P NEW LOW 30 MIN: CPT | Mod: 25 | Performed by: INTERNAL MEDICINE

## 2024-07-05 RX ORDER — TESTOSTERONE CYPIONATE 200 MG/ML
INJECTION, SOLUTION INTRAMUSCULAR
COMMUNITY
Start: 2024-06-24 | End: 2024-07-05

## 2024-07-05 RX ORDER — NEEDLES, SAFETY 22GX1 1/2"
NEEDLE, DISPOSABLE MISCELLANEOUS
COMMUNITY
Start: 2024-04-24

## 2024-07-05 RX ORDER — METHOTREXATE 25 MG/ML
INJECTION, SOLUTION INTRA-ARTERIAL; INTRAMUSCULAR; INTRAVENOUS
COMMUNITY
Start: 2024-05-23 | End: 2024-07-05

## 2024-07-05 NOTE — LETTER
7/5/2024      Robson Galeano  [address] [address] [id number]      Dear Colleague,    Thank you for referring your patient, Robson Galeano, to the [facility]. Please see a copy of my visit note below.    Pulmonary Clinic Outpatient Consultation    Assessment and Plan:   54M with a history of RA on immunosuppression, former smoker, RACHELL on CPAP, presents for evaluation of emphysema and abnormal chest imaging. PFTs were normal. CT chest showed mild emphysema. He is essentially asymptomatic. His exercise tolerance is excellent. I offered albuterol rescue inhaler to be used as needed if he were to get any chest tightness or SOB but he declines. I reassured him that he does NOT have COPD and that his lung function is excellent.     In terms of vaccinations, I recommend her get the annual flu vaccine. He should also get pneumococcal vaccination with PCV20 at the end of this year, since has last dose was in 2019.     No additional CT scans are indicated. He does not qualify for LDCT for lung cancer screening as his quit date was >15 years ago.     He can follow up with me as needed.    Chepe Ravi MD (Avi)  [facility] ([facility])  Clinic [phone]  Fax [phone]     CCx: emphysema evaluation    HPI: 54M with a history of RA on immunosuppression, former smoker, RACHELL on CPAP, presents for evaluation of emphysema and abnormal chest imaging. He recently underwent CT scan which showed mild bi-apical emphysema. He is a former smoker; quit in 2002. No cough, hemoptysis or sputum production. His exercise tolerance is fine. He denies significant SOB at rest or with exertion. He very rarely gets chest tightness and SOB with exertion which goes away without any inhaler use. He is a  and frequency climbs 3 flights of stairs without issues. Followed by rheumatology at [facility].     ROS:  A 12-system review was obtained and was  negative with the exception of the symptoms endorsed in the history of present illness.    PMH:  No past medical history on file.     PSH:  Past Surgical History:   Procedure Laterality Date     COLONOSCOPY N/A 2024    Procedure: COLONOSCOPY, FLEXIBLE, WITH LESION REMOVAL USING SNARE;  Surgeon: Mason Herrera MD;  Location: [facility]     IR LUMBAR PUNCTURE  2021     IR LUMBAR PUNCTURE  2021     IR LUMBAR PUNCTURE  2022       Allergies:  Allergies   Allergen Reactions     Bee Venom Other (See Comments)     Hallucinates, no anaphylaxis           Family HX:  No family history on file.    Social Hx:  Social History     Socioeconomic History     Marital status:      Spouse name: Not on file     Number of children: Not on file     Years of education: Not on file     Highest education level: Not on file   Occupational History     Not on file   Tobacco Use     Smoking status: Former     Current packs/day: 0.00     Types: Cigarettes     Quit date: 2002     Years since quittin.5     Passive exposure: Past     Smokeless tobacco: Never   Vaping Use     Vaping status: Never Used   Substance and Sexual Activity     Alcohol use: Not Currently     Drug use: Yes     Types: Marijuana     Comment: has marijuana card     Sexual activity: Not on file   Other Topics Concern     Not on file   Social History Narrative     Not on file     Social Determinants of Health     Financial Resource Strain: Low Risk  (3/20/2024)    Financial Resource Strain      Within the past 12 months, have you or your family members you live with been unable to get utilities (heat, electricity) when it was really needed?: No   Food Insecurity: No Food Insecurity (2024)    Received from [facility]    Hunger Vital Sign      Worried About Running Out of Food in the Last Year: Never true      Ran Out of Food in the Last Year: Never true   Transportation Needs: No Transportation Needs (2024)    Received from [facility]    PRAPARE - Transportation      Lack of Transportation (Medical): No      Lack of Transportation (Non-Medical): No   Physical Activity: Insufficiently Active (4/17/2024)    Received from [facility]    Exercise Vital Sign      Days of Exercise per Week: 7 days      Minutes of Exercise per Session: 10 min   Stress: Stress Concern Present (3/20/2024)    [place] [employer]ational [employer] - Occupational Stress Questionnaire      Feeling of Stress : To some extent   Social Connections: Unknown (3/20/2024)    Social Connection and Isolation Panel [NHANES]      Frequency of Communication with Friends and Family: Not on file      Frequency of Social Gatherings with Friends and Family: Twice a week      Attends Faith Services: Not on file      Active Member of Clubs or Organizations: Not on file      Attends Club or Organization Meetings: Not on file      Marital Status: Not on file   Interpersonal Safety: Not on file   Housing Stability: Low Risk  (4/17/2024)    Received from [facility]    Housing Stability      What is your living situation today?: I have a steady place to live       Current Meds:  Current Outpatient Medications   Medication Sig Dispense Refill     amitriptyline (ELAVIL) 10 MG tablet Take 1 tablet (10 mg) by mouth at bedtime 90 tablet 3     B-D TB SYRINGE 27G X 1/2\" 1 ML MISC        Filter Needles (BD FILTER NEEDLE) 18G X 1-1/2\" MISC USE ONCE WEEKLY WITH TESTOSTERONE 12 each 3     folic acid (FOLVITE) 1 MG tablet Take 1 mg by mouth       gabapentin (NEURONTIN) 300 MG capsule Take 1 capsule (300 mg) by mouth 2 times daily 180 capsule 3     methotrexate sodium, PF, 50 MG/2ML SOLN injection Inject 15 mg Subcutaneous       Probiotic Product (FORTIFY DAILY PROBIOTIC PO)        Syringe/Needle, Disp, 23G X 1-1/2\" 3 ML MISC 1 each once a week 12 each 3     tamsulosin (FLOMAX) 0.4 MG capsule Take 1 capsule (0.4 mg) by mouth daily 90 capsule 3     testosterone cypionate (DEPOTESTOSTERONE) 100 " "MG/ML injection Inject 1 mL (100 mg) into the muscle once a week 4 mL 5     valACYclovir (VALTREX) 1000 mg tablet Take 1 tablet (1,000 mg) by mouth daily 5 tablet 5     valACYclovir (VALTREX) 500 MG tablet Take 1 tablet (500 mg) by mouth daily 90 tablet 3       Physical Exam:  BP (!) 137/91 (BP Location: Right arm, Patient Position: Sitting, Cuff Size: Adult Regular)   Pulse 60   Ht 1.689 m (5' 6.5\")   Wt 99.3 kg (219 lb)   SpO2 97%   BMI 34.82 kg/m    Gen: awake, alert, oriented, no distress  HEENT: nasal turbinates are unremarkable, no oropharyngeal lesions, no cervical or supraclavicular lymphadenopathy  CV: RRR, no M/G/R  Resp: CTAB, no focal crackles or wheezes  Skin: no apparent rashes  Ext: no cyanosis, clubbing or edema  Neuro: alert, nonfocal    Labs:  Reviewed    Hgb 15.9    Imaging studies:  Personally reviewed     EXAM: CT CHEST W/O CONTRAST  LOCATION: [facility]  DATE: 5/17/2024     INDICATION:  Abnormal chest x-ray;  COMPARISON: The chest radiograph describing the abnormality was not immediately available for review. The report from a chest radiograph performed 4/24/2024 from [facility] was available and reviewed.  TECHNIQUE: CT chest without IV contrast. Multiplanar reformats were obtained. Dose reduction techniques were used.  CONTRAST: None.     FINDINGS:   LUNGS AND PLEURA: Mild paraseptal emphysema is present in the apices. Focal confluent subpleural bulla present along the anterior apical pleura of the left upper lobe (series 3, image 59). Small territories of subpleural opacity are present along the   posterior costal pleura of the lower lobes consistent with positional atelectasis. No airspace opacities. No pleural space abnormality.     The report of the chest radiograph described a potential abnormality in the left lateral costophrenic angle. No opacity or lesion in this location.     MEDIASTINUM: Cardiac chambers are normal in size. No pericardial effusion. " Normal caliber thoracic aorta. No aortic or great vessel atheromatous calcifications. Esophagus is decompressed. No lymphadenopathy. The thyroid gland is normal.     CORONARY ARTERY CALCIFICATION: None.     UPPER ABDOMEN: Hepatic steatosis.     MUSCULOSKELETAL: Disc space narrowing and discogenic sclerosis of T7-T8. Small marginal osteophytes at other mid and lower thoracic spine levels. No aggressive or destructive bone lesions. Several healed upper left rib fracture deformities.                                                                      IMPRESSION:      1.  No parenchymal abnormalities in the left lateral costophrenic sulcus, questioned on recent chest radiographs.  2.  Upper lung predominant emphysema. If patient is amenable and meets screening criteria, consider annual low-dose CT chest for lung cancer screening.  3.  Hepatic steatosis.    Pulmonary Function Testing  7/5/2024  FEV1 3.06L, 98%  %  Ratio 0.74  No BD response  TLC 6.67L, 103%  Dlco 124% camron for hgb    Again, thank you for allowing me to participate in the care of your patient.        Sincerely,        Chepe Ravi MD

## 2024-07-05 NOTE — PROGRESS NOTES
Pulmonary Clinic Outpatient Consultation    Assessment and Plan:   54M with a history of RA on immunosuppression, former smoker, RACHELL on CPAP, presents for evaluation of emphysema and abnormal chest imaging. PFTs were normal. CT chest showed mild emphysema. He is essentially asymptomatic. His exercise tolerance is excellent. I offered albuterol rescue inhaler to be used as needed if he were to get any chest tightness or SOB but he declines. I reassured him that he does NOT have COPD and that his lung function is excellent.     In terms of vaccinations, I recommend her get the annual flu vaccine. He should also get pneumococcal vaccination with PCV20 at the end of this year, since has last dose was in 2019.     No additional CT scans are indicated. He does not qualify for LDCT for lung cancer screening as his quit date was >15 years ago.     He can follow up with me as needed.    Chepe Ravi MD (Avi)  [facility]  Clinic [phone]  Fax [phone]     CCx: emphysema evaluation    HPI: 54M with a history of RA on immunosuppression, former smoker, RACHELL on CPAP, presents for evaluation of emphysema and abnormal chest imaging. He recently underwent CT scan which showed mild bi-apical emphysema. He is a former smoker; quit in 2002. No cough, hemoptysis or sputum production. His exercise tolerance is fine. He denies significant SOB at rest or with exertion. He very rarely gets chest tightness and SOB with exertion which goes away without any inhaler use. He is a  and frequency climbs 3 flights of stairs without issues. Followed by rheumatology at [facility].     ROS:  A 12-system review was obtained and was negative with the exception of the symptoms endorsed in the history of present illness.    PMH:  No past medical history on file.     PSH:  Past Surgical History:   Procedure Laterality Date    COLONOSCOPY N/A 1/23/2024    Procedure: COLONOSCOPY,  FLEXIBLE, WITH LESION REMOVAL USING SNARE;  Surgeon: Mason Herrera MD;  Location: [facility]    IR LUMBAR PUNCTURE  2021    IR LUMBAR PUNCTURE  2021    IR LUMBAR PUNCTURE  2022       Allergies:  Allergies   Allergen Reactions    Bee Venom Other (See Comments)     Hallucinates, no anaphylaxis           Family HX:  No family history on file.    Social Hx:  Social History     Socioeconomic History    Marital status:      Spouse name: Not on file    Number of children: Not on file    Years of education: Not on file    Highest education level: Not on file   Occupational History    Not on file   Tobacco Use    Smoking status: Former     Current packs/day: 0.00     Types: Cigarettes     Quit date: 2002     Years since quittin.5     Passive exposure: Past    Smokeless tobacco: Never   Vaping Use    Vaping status: Never Used   Substance and Sexual Activity    Alcohol use: Not Currently    Drug use: Yes     Types: Marijuana     Comment: has marijuana card    Sexual activity: Not on file   Other Topics Concern    Not on file   Social History Narrative    Not on file     Social Determinants of Health     Financial Resource Strain: Low Risk  (3/20/2024)    Financial Resource Strain     Within the past 12 months, have you or your family members you live with been unable to get utilities (heat, electricity) when it was really needed?: No   Food Insecurity: No Food Insecurity (2024)    Received from [facility]    Hunger Vital Sign     Worried About Running Out of Food in the Last Year: Never true     Ran Out of Food in the Last Year: Never true   Transportation Needs: No Transportation Needs (2024)    Received from [facility]    PRAPARE - Transportation     Lack of Transportation (Medical): No     Lack of Transportation (Non-Medical): No   Physical Activity: Insufficiently Active (2024)    Received from [facility]    Exercise Vital Sign     Days of Exercise per Week: 7 days      "Minutes of Exercise per Session: 10 min   Stress: Stress Concern Present (3/20/2024)    [employer] [place] [employer] - Occupational Stress Questionnaire     Feeling of Stress : To some extent   Social Connections: Unknown (3/20/2024)    Social Connection and Isolation Panel [NHANES]     Frequency of Communication with Friends and Family: Not on file     Frequency of Social Gatherings with Friends and Family: Twice a week     Attends Tenriism Services: Not on file     Active Member of Clubs or Organizations: Not on file     Attends Club or Organization Meetings: Not on file     Marital Status: Not on file   Interpersonal Safety: Not on file   Housing Stability: Low Risk  (4/17/2024)    Received from [facility]    Housing Stability     What is your living situation today?: I have a steady place to live       Current Meds:  Current Outpatient Medications   Medication Sig Dispense Refill    amitriptyline (ELAVIL) 10 MG tablet Take 1 tablet (10 mg) by mouth at bedtime 90 tablet 3    B-D TB SYRINGE 27G X 1/2\" 1 ML MISC       Filter Needles (BD FILTER NEEDLE) 18G X 1-1/2\" MISC USE ONCE WEEKLY WITH TESTOSTERONE 12 each 3    folic acid (FOLVITE) 1 MG tablet Take 1 mg by mouth      gabapentin (NEURONTIN) 300 MG capsule Take 1 capsule (300 mg) by mouth 2 times daily 180 capsule 3    methotrexate sodium, PF, 50 MG/2ML SOLN injection Inject 15 mg Subcutaneous      Probiotic Product (FORTIFY DAILY PROBIOTIC PO)       Syringe/Needle, Disp, 23G X 1-1/2\" 3 ML MISC 1 each once a week 12 each 3    tamsulosin (FLOMAX) 0.4 MG capsule Take 1 capsule (0.4 mg) by mouth daily 90 capsule 3    testosterone cypionate (DEPOTESTOSTERONE) 100 MG/ML injection Inject 1 mL (100 mg) into the muscle once a week 4 mL 5    valACYclovir (VALTREX) 1000 mg tablet Take 1 tablet (1,000 mg) by mouth daily 5 tablet 5    valACYclovir (VALTREX) 500 MG tablet Take 1 tablet (500 mg) by mouth daily 90 tablet 3       Physical Exam:  BP (!) 137/91 (BP " "Location: Right arm, Patient Position: Sitting, Cuff Size: Adult Regular)   Pulse 60   Ht 1.689 m (5' 6.5\")   Wt 99.3 kg (219 lb)   SpO2 97%   BMI 34.82 kg/m    Gen: awake, alert, oriented, no distress  HEENT: nasal turbinates are unremarkable, no oropharyngeal lesions, no cervical or supraclavicular lymphadenopathy  CV: RRR, no M/G/R  Resp: CTAB, no focal crackles or wheezes  Skin: no apparent rashes  Ext: no cyanosis, clubbing or edema  Neuro: alert, nonfocal    Labs:  Reviewed    Hgb 15.9    Imaging studies:  Personally reviewed     EXAM: CT CHEST W/O CONTRAST  LOCATION: [facility]  DATE: 5/17/2024     INDICATION:  Abnormal chest x-ray;  COMPARISON: The chest radiograph describing the abnormality was not immediately available for review. The report from a chest radiograph performed 4/24/2024 from [facility] was available and reviewed.  TECHNIQUE: CT chest without IV contrast. Multiplanar reformats were obtained. Dose reduction techniques were used.  CONTRAST: None.     FINDINGS:   LUNGS AND PLEURA: Mild paraseptal emphysema is present in the apices. Focal confluent subpleural bulla present along the anterior apical pleura of the left upper lobe (series 3, image 59). Small territories of subpleural opacity are present along the   posterior costal pleura of the lower lobes consistent with positional atelectasis. No airspace opacities. No pleural space abnormality.     The report of the chest radiograph described a potential abnormality in the left lateral costophrenic angle. No opacity or lesion in this location.     MEDIASTINUM: Cardiac chambers are normal in size. No pericardial effusion. Normal caliber thoracic aorta. No aortic or great vessel atheromatous calcifications. Esophagus is decompressed. No lymphadenopathy. The thyroid gland is normal.     CORONARY ARTERY CALCIFICATION: None.     UPPER ABDOMEN: Hepatic steatosis.     MUSCULOSKELETAL: Disc space narrowing and discogenic " sclerosis of T7-T8. Small marginal osteophytes at other mid and lower thoracic spine levels. No aggressive or destructive bone lesions. Several healed upper left rib fracture deformities.                                                                      IMPRESSION:      1.  No parenchymal abnormalities in the left lateral costophrenic sulcus, questioned on recent chest radiographs.  2.  Upper lung predominant emphysema. If patient is amenable and meets screening criteria, consider annual low-dose CT chest for lung cancer screening.  3.  Hepatic steatosis.    Pulmonary Function Testing  7/5/2024  FEV1 3.06L, 98%  %  Ratio 0.74  No BD response  TLC 6.67L, 103%  Dlco 124% camron for hgb

## 2024-07-07 LAB
DLCOCOR-%PRED-PRE: 124 %
DLCOCOR-PRE: 31.59 ML/MIN/MMHG
DLCOUNC-%PRED-PRE: 128 %
DLCOUNC-PRE: 32.7 ML/MIN/MMHG
DLCOUNC-PRED: 25.44 ML/MIN/MMHG
ERV-%PRED-PRE: 61 %
ERV-PRE: 0.87 L
ERV-PRED: 1.42 L
EXPTIME-PRE: 6.54 SEC
FEF2575-%PRED-POST: 90 %
FEF2575-%PRED-PRE: 83 %
FEF2575-POST: 2.51 L/SEC
FEF2575-PRE: 2.34 L/SEC
FEF2575-PRED: 2.78 L/SEC
FEFMAX-%PRED-PRE: 78 %
FEFMAX-PRE: 6.83 L/SEC
FEFMAX-PRED: 8.7 L/SEC
FEV1-%PRED-PRE: 98 %
FEV1-PRE: 3.06 L
FEV1FEV6-PRE: 75 %
FEV1FEV6-PRED: 80 %
FEV1FVC-PRE: 74 %
FEV1FVC-PRED: 80 %
FEV1SVC-PRE: 72 %
FEV1SVC-PRED: 73 %
FIFMAX-PRE: 5.24 L/SEC
FRCPLETH-%PRED-PRE: 97 %
FRCPLETH-PRE: 3.28 L
FRCPLETH-PRED: 3.37 L
FVC-%PRED-PRE: 106 %
FVC-PRE: 4.13 L
FVC-PRED: 3.88 L
IC-%PRED-PRE: 119 %
IC-PRE: 3.39 L
IC-PRED: 2.84 L
RVPLETH-%PRED-PRE: 108 %
RVPLETH-PRE: 2.41 L
RVPLETH-PRED: 2.23 L
TLCPLETH-%PRED-PRE: 103 %
TLCPLETH-PRE: 6.67 L
TLCPLETH-PRED: 6.42 L
VA-%PRED-PRE: 104 %
VA-PRE: 6.06 L
VC-%PRED-PRE: 101 %
VC-PRE: 4.26 L
VC-PRED: 4.21 L

## 2024-08-08 DIAGNOSIS — G96.191 TARLOV CYST: Primary | ICD-10-CM

## 2024-08-08 DIAGNOSIS — M54.16 LUMBAR RADICULOPATHY: ICD-10-CM

## 2024-09-04 ENCOUNTER — DOCUMENTATION ONLY (OUTPATIENT)
Dept: RHEUMATOLOGY | Facility: CLINIC | Age: 54
End: 2024-09-04
Payer: COMMERCIAL

## 2024-09-07 ENCOUNTER — LAB (OUTPATIENT)
Dept: LAB | Facility: CLINIC | Age: 54
End: 2024-09-07
Payer: COMMERCIAL

## 2024-09-07 DIAGNOSIS — Z79.899 HIGH RISK MEDICATIONS (NOT ANTICOAGULANTS) LONG-TERM USE: ICD-10-CM

## 2024-09-07 LAB
ALT SERPL W P-5'-P-CCNC: 46 U/L (ref 0–70)
AST SERPL W P-5'-P-CCNC: 38 U/L (ref 0–45)
BASOPHILS # BLD AUTO: 0 10E3/UL (ref 0–0.2)
BASOPHILS NFR BLD AUTO: 1 %
CREAT SERPL-MCNC: 1.08 MG/DL (ref 0.67–1.17)
EGFRCR SERPLBLD CKD-EPI 2021: 81 ML/MIN/1.73M2
EOSINOPHIL # BLD AUTO: 0.3 10E3/UL (ref 0–0.7)
EOSINOPHIL NFR BLD AUTO: 8 %
ERYTHROCYTE [DISTWIDTH] IN BLOOD BY AUTOMATED COUNT: 13.3 % (ref 10–15)
HCT VFR BLD AUTO: 43.9 % (ref 40–53)
HGB BLD-MCNC: 14.3 G/DL (ref 13.3–17.7)
IMM GRANULOCYTES # BLD: 0 10E3/UL
IMM GRANULOCYTES NFR BLD: 0 %
LYMPHOCYTES # BLD AUTO: 1.1 10E3/UL (ref 0.8–5.3)
LYMPHOCYTES NFR BLD AUTO: 29 %
MCH RBC QN AUTO: 30.5 PG (ref 26.5–33)
MCHC RBC AUTO-ENTMCNC: 32.6 G/DL (ref 31.5–36.5)
MCV RBC AUTO: 94 FL (ref 78–100)
MONOCYTES # BLD AUTO: 0.3 10E3/UL (ref 0–1.3)
MONOCYTES NFR BLD AUTO: 8 %
NEUTROPHILS # BLD AUTO: 2.1 10E3/UL (ref 1.6–8.3)
NEUTROPHILS NFR BLD AUTO: 55 %
PLATELET # BLD AUTO: 171 10E3/UL (ref 150–450)
RBC # BLD AUTO: 4.69 10E6/UL (ref 4.4–5.9)
WBC # BLD AUTO: 3.8 10E3/UL (ref 4–11)

## 2024-09-07 PROCEDURE — 36415 COLL VENOUS BLD VENIPUNCTURE: CPT

## 2024-09-07 PROCEDURE — 85025 COMPLETE CBC W/AUTO DIFF WBC: CPT

## 2024-09-07 PROCEDURE — 82565 ASSAY OF CREATININE: CPT

## 2024-09-07 PROCEDURE — 84460 ALANINE AMINO (ALT) (SGPT): CPT

## 2024-09-07 PROCEDURE — 84450 TRANSFERASE (AST) (SGOT): CPT

## 2024-09-10 ENCOUNTER — TELEPHONE (OUTPATIENT)
Dept: NEUROSURGERY | Facility: CLINIC | Age: 54
End: 2024-09-10
Payer: COMMERCIAL

## 2024-09-10 NOTE — TELEPHONE ENCOUNTER
Called patient and scheduled appointment w/ Brigid Anne per Dr. Pearson via patient call list. -KB

## 2024-09-11 NOTE — TELEPHONE ENCOUNTER
SPINE PATIENTS - NEW PROTOCOL PREVISIT    RECORDS RECEIVED FROM: Internal    REASON FOR VISIT: Tarlov cyst/ Lumbar radiculopathy   PROVIDER: Brigid Anne PA-C   DATE OF APPT: 10/3/24 @ 8:00 am    NOTES (FOR ALL VISITS) STATUS DETAILS   OFFICE NOTE from referring provider Internal 8/7/24 (MyChart Note), 6/6/24 Kayla Bradley MD @[facility]     OFFICE NOTE from other specialist Internal 6/17/24 Wes Bal DO @[facility]    5[facility]20/24 [facility]    5/10/24 Nupur Torres APRN CNP @[facility]    4/12/24 Jace Campbell PA-C @[facility]     MEDICATION LIST Internal    IMAGING  (FOR ALL VISITS)     MRI (HEAD, NECK, SPINE) Internal [facility]  5/9/24 MR Sacrum & Coccyx  4/23/24 MR Lumbar Spine      5/28/21 MR Lumbar Spine     XRAY (SPINE) *NEUROSURGERY* Internal [facility]  6/3/24 XR Lumbar Flex/Ext

## 2024-09-25 ENCOUNTER — TELEPHONE (OUTPATIENT)
Dept: FAMILY MEDICINE | Facility: CLINIC | Age: 54
End: 2024-09-25
Payer: COMMERCIAL

## 2024-09-25 NOTE — TELEPHONE ENCOUNTER
Patient Quality Outreach    Patient is due for the following:   Hypertension -  BP check    Next Steps:   Schedule a nurse only visit for BP check    Type of outreach:    Sent [employer] message.      Questions for provider review:    None           Bailee Kahler

## 2024-10-03 ENCOUNTER — CARE COORDINATION (OUTPATIENT)
Dept: NEUROSURGERY | Facility: CLINIC | Age: 54
End: 2024-10-03

## 2024-10-03 ENCOUNTER — TELEPHONE (OUTPATIENT)
Dept: NEUROSURGERY | Facility: CLINIC | Age: 54
End: 2024-10-03

## 2024-10-03 ENCOUNTER — PRE VISIT (OUTPATIENT)
Dept: NEUROSURGERY | Facility: CLINIC | Age: 54
End: 2024-10-03

## 2024-10-03 NOTE — PROGRESS NOTES
Writer spoke with patient. Patient cancelled with message sent to Neurosurgery Clinic Scheduling to reschedule patient.     Jyotsna Carrasco LPN  Neurosurgery

## 2024-10-08 ENCOUNTER — OFFICE VISIT (OUTPATIENT)
Dept: NEUROSURGERY | Facility: CLINIC | Age: 54
End: 2024-10-08
Payer: COMMERCIAL

## 2024-10-08 VITALS
SYSTOLIC BLOOD PRESSURE: 127 MMHG | WEIGHT: 219 LBS | OXYGEN SATURATION: 96 % | HEART RATE: 71 BPM | BODY MASS INDEX: 35.2 KG/M2 | DIASTOLIC BLOOD PRESSURE: 81 MMHG | HEIGHT: 66 IN | RESPIRATION RATE: 16 BRPM

## 2024-10-08 DIAGNOSIS — R20.2 TINGLING IN EXTREMITIES: ICD-10-CM

## 2024-10-08 DIAGNOSIS — R20.2 PARESTHESIAS: ICD-10-CM

## 2024-10-08 DIAGNOSIS — M54.50 CHRONIC BILATERAL LOW BACK PAIN WITHOUT SCIATICA: ICD-10-CM

## 2024-10-08 DIAGNOSIS — G89.29 CHRONIC BILATERAL LOW BACK PAIN WITHOUT SCIATICA: ICD-10-CM

## 2024-10-08 DIAGNOSIS — G96.191 TARLOV CYST: Primary | ICD-10-CM

## 2024-10-08 DIAGNOSIS — M53.3 PAIN OF RIGHT SACROILIAC JOINT: ICD-10-CM

## 2024-10-08 PROBLEM — G47.33 OSA (OBSTRUCTIVE SLEEP APNEA): Chronic | Status: ACTIVE | Noted: 2024-01-08

## 2024-10-08 PROBLEM — N40.0 BENIGN PROSTATIC HYPERPLASIA, UNSPECIFIED WHETHER LOWER URINARY TRACT SYMPTOMS PRESENT: Chronic | Status: ACTIVE | Noted: 2023-03-02

## 2024-10-08 PROBLEM — I10 ESSENTIAL HYPERTENSION: Chronic | Status: ACTIVE | Noted: 2024-01-08

## 2024-10-08 PROCEDURE — 99417 PROLNG OP E/M EACH 15 MIN: CPT | Performed by: PHYSICIAN ASSISTANT

## 2024-10-08 PROCEDURE — 99215 OFFICE O/P EST HI 40 MIN: CPT | Performed by: PHYSICIAN ASSISTANT

## 2024-10-08 NOTE — LETTER
10/8/2024       RE: Robson Galeano  [id number] [facility] [address]     Dear Colleague,    Thank you for referring your patient, Robson Galeano, to the [facility] at [facility]. Please see a copy of my visit note below.      [facility]  [address]  [address]RD [address]  [place] [address]  Phone: [phone]  Fax: [phone]      Patient:  Robson Galeano, Date of birth 1970, 54 year old, male      ASSESSMENT & PLAN:  Patient presents today because of abnormal MRI showing a likely Tarlov cyst on the left at the S3 level.  This is an incidental finding and he does not appear to by symptomatic from this finding.  No prior images to show if there has been a change, so recommend 6 month f/u with sacrum MRI w/wo.  Patient does have some non dermatomal numbness in his left hand, right leg/foot on exam today.  His pain in the right buttock/leg with flexion and several SI joint provocative tests indicate he has right SI joint sacroiliitis.      We discussed that the presumed Tarlov cyst is a benign lesion and usually does not require any interventions.  There are no good interventions that are available for these lesions.      -f/u 2-3 days after getting sacrum MRI w/wo in 6 months by video.    -Folate and B12 levels to r/o deficiencies that may be contributing to paresthesias in extremities. This can be done at his convenience and added onto blood work that he gets on a regular basis.   -Could consider a right SI joint injection in the future if this becomes more bothersome to him.       I spent 104 minutes spent in patient care, independent review and interpretation of medical records/imaging, reviewing old records.    History of Present Illness:    10/08/2024 Visit: - referred by Dr. Bradley PM&R for  Tarlov cyst/ Lumbar radiculopathy     Per Chart and Referring provider's note  6/6/24 - patient experiencing constant urge to defecate, occasional urinary incontinence (worse with flexion), BLE pain (mostly posterior leg to knee level, but sometimes to feet), feet n/t and occasional weakness in legs.  Lumbar XR w/ T11 wedging and L5 pares defects, L1-3 1 mm retrolisthesis w/o instability.  MRI lumbar w/ sev R L4/5 NFS; mod L3-5 left NFS; sacral MRI w/ fusiform cystic lesion on left L3 NR presumably a perineural cyst; S3 unilateral cyst.  EMG was recommended to r/o neuropathy and possible L4/5 arin TFESI as well as repeating the sacral MRI in 6 months to monitor cystic structure.  Last PT at  in 2021 aggravated sxs.  No significant benefit w/ tylenol, ibu, oral steroids, amitriptyline, gabapentin. L3-5 arin RFA in 2022 and 2023 that was very helpful with lbp.  His exam was neuro intact.    PMH RA/psoriasis (follows w/ rheumatology at [facility]; on immunosuppressant therapy), RACHELL (CPAP), mild emphysema (former smoker).      Patient notes pain with his sciatica nerve, mostly on the right side (pain down the back of his leg).  He has had chronic back pain and has undergone 3-4 RFA which have been helpful for his back pain.  He had workup that found a Tarlov Cyst.  He notes constipation/diarrhea issues for about 1 year.  He switched over to injectable Methotrexate about 6 months ago and has improved with his abdominal issues (prior with oral had 3 bouts of diverticulitis).  He notes improved function of bladder with Flomax.  He sometimes gets the sensation that he needs to have a BM, but then doesn't really need to go.  It is not abnormal for him to have BM 4-5 times per day.  Going to brush his teeth and leaning forward causes pressure on his bladder area and increases sensation to have to urinate.  He notes a squeezing sensation in his sacral area that is slightly more eccentric to the right side for the past 8-9 months which he gets about 2 times per week.  He indicates an occasional twinge in  his left leg and a stabbing sensation in his low back for now reason.  He notices more problems in the morning and with getting up in his low back.  He notes occasional n/t in his feet in the last 6-8 months.  He notes associated curling of his feet and altered walking pattern when this occurs.  He notes this occurs more with prolonged sitting and bouncing around in the bobcat or lawnmower.        Conservative Treatment:  Medical marijuana  Gabapentin 300 mg bid - tried tid but unable to tolerated b/c of sedative/cognitive effect  Amitriptyline      Tobacco use:  Former (cessation 2022)    Alcohol consumption - Drinks 6-pack on some weekends; social drinker.  Does not drink every night.     IMAGING   Imaging independently reviewed.    EMG/NCS BLE 6/17/24 - Interpretation: Normal study   1. There is no electrodiagnostic evidence of lumbosacral radiculopathy, lumbosacral plexopathy, or focal neuropathy in the bilateral lower extremities.     XR Lumbar Bending Only 2/3 Views  Result Date: 6/4/2024  EXAM: XR LUMBAR FLEX/EXT 2/3 VIEWS LOCATION: [facility] DATE: 6/3/2024 INDICATION: Chronic bilateral low back pain without sciatica. COMPARISON: 4/23/2024 MR lumbar spine.   IMPRESSION: Flexion and extension views of the lower thoracic spine and lumbar spine. Five lumbar segments. Mild chronic anterior wedging T11. Interspace narrowing and adjacent osteophytes mid lumbar levels. Lower lumbar spine facet arthropathy. Satisfactory sacral alignment. L5 pars interarticularis defects minimally displaced with adjacent hypertrophic changes. 1 mm retrolisthesis L1-L2 and L2-L3 unchanged in flexion and extension with no high-grade instability noted. No significant subluxation/spondylolisthesis present at L5-S1.    CT Chest w/o Contrast  Result Date: 5/17/2024  EXAM: CT CHEST W/O CONTRAST LOCATION: [facility] DATE: 5/17/2024 INDICATION:  Abnormal chest x-ray; COMPARISON: The chest  radiograph describing the abnormality was not immediately available for review. The report from a chest radiograph performed 4/24/2024 from [facility] was available and reviewed. TECHNIQUE: CT chest without IV contrast. Multiplanar reformats were obtained. Dose reduction techniques were used. CONTRAST: None. FINDINGS: LUNGS AND PLEURA: Mild paraseptal emphysema is present in the apices. Focal confluent subpleural bulla present along the anterior apical pleura of the left upper lobe (series 3, image 59). Small territories of subpleural opacity are present along the posterior costal pleura of the lower lobes consistent with positional atelectasis. No airspace opacities. No pleural space abnormality. The report of the chest radiograph described a potential abnormality in the left lateral costophrenic angle. No opacity or lesion in this location. MEDIASTINUM: Cardiac chambers are normal in size. No pericardial effusion. Normal caliber thoracic aorta. No aortic or great vessel atheromatous calcifications. Esophagus is decompressed. No lymphadenopathy. The thyroid gland is normal. CORONARY ARTERY CALCIFICATION: None. UPPER ABDOMEN: Hepatic steatosis. MUSCULOSKELETAL: Disc space narrowing and discogenic sclerosis of T7-T8. Small marginal osteophytes at other mid and lower thoracic spine levels. No aggressive or destructive bone lesions. Several healed upper left rib fracture deformities.   IMPRESSION: 1.  No parenchymal abnormalities in the left lateral costophrenic sulcus, questioned on recent chest radiographs. 2.  Upper lung predominant emphysema. If patient is amenable and meets screening criteria, consider annual low-dose CT chest for lung cancer screening. 3.  Hepatic steatosis.     MR Sacrum and Coccyx w/o & w Contrast  Result Date: 5/9/2024  MR sacrum  without and with contrast 5/9/2024 8:26 AM Techniques: Multiplanar multisequence imaging of the sacrum was obtained before and after administration of intravenous  contrast using tumor/infection protocol. Contrast: 10 mL Gadavist History: Neoplasm of sacral spinal nerve; Chronic bilateral low back pain without sciatica; Chronic bilateral low back pain without sciatica Comparison: CT abdomen pelvis 1/8/2024. Lumbar spine MR 4/23/2024 Findings: Rectosigmoid endoscopy clips with associated susceptibility artifact which degrades evaluation of the adjacent soft tissues. Osseous structures Osseous structures: No fracture, stress reaction, avascular necrosis, or focal osseous lesion is seen. Chronic pars defects at L5. Joint and periarticular soft tissue Internal derangement of joints are not well assessed owing to chosen field of view. Partially visualized subchondral cystic change in the bilateral acetabula with paralabral cyst formation about the left acetabulum, indicative of overlying labral tear. Mild degenerative changes of the sacroiliac joints. Degenerative disc disease at L4-5. Muscles and tendons Muscles: Visualized muscles are unremarkable without evidence of muscle strain, atrophy or mass. Tendons: The visualized tendons are intact. Nerves: 2.4 x 1.7 x 2.4 cm fusiform T1 hypointense, T2 hyperintense cystic lesion closely related to the left S3 sacral nerve. No enhancement on postcontrast sequences. Other Findings: None.   Impression: Examination degraded by metallic artifact related to endoscopy clip. 2.4 cm nonenhancing T2 hyperintense cystic structure intimately associated with the left S3 nerve distal to the sacral neural foramina, most likely representing a Tarlov cyst. I have personally reviewed the examination and initial interpretation and I agree with the findings. LUCAS DEJESUS MD (Joe)   SYSTEM ID:  [id number]    MR Lumbar Spine w/o & w Contrast  Result Date: 4/24/2024  EXAM: MR LUMBAR SPINE W/O and W CONTRAST LOCATION: [facility] DATE: 4/23/2024 INDICATION:  Neoplasm of sacral spinal nerve, Chronic bilateral low back pain  "without sciatica, Chronic bilateral low back pain without sciatica COMPARISON: 05/28/2021. CONTRAST: 10ml gadavist TECHNIQUE: Routine Lumbar Spine MRI without and with IV contrast. FINDINGS: Degenerative endplate changes with Modic type I degenerative endplate changes at L3-L4 enhancement findings on postcontrast imaging in this area as well. Conus terminates normally. Straightening of the normal lumbar lordosis. L1-L2: No canal stenosis. No significant foraminal narrowing. Facet disease. L2-L3: Broad-based disc bulge eccentric to the left. Moderate left foraminal narrowing. L3-L4: Broad-based disc bulge. Mild canal stenosis. Facet disease. Mild right and moderate left foraminal narrowing. L4-L5: Broad-based disc bulge. Mild canal stenosis. Facet disease. Severe right and moderate left foraminal narrowing. L5-S1: Broad-based disc bulge without canal stenosis. Facet disease. No significant foraminal narrowing.   IMPRESSION: 1.  Modic type I degenerative endplate changes at L3-L4. The left sacral lesion identified on prior CT abdomen pelvis from 01/08/2024 or is not visualized on this examination but this acquisition does not include the entirety of the sacral distribution. Recommend dedicated sacrum/sacroiliac MR without and with contrast.    DX Hand Bilateral 3+ Views  Result Date: 4/23/2024  EXAM:  DX HAND BILATERAL 3+ VIEWS  Advanced degenerative arthritis of the right 3rd MCP joint and bilateral STT joints. Moderate degenerative arthritis of the left 3rd MCP joint with surrounding subchondral cystic changes. Small chronic ununited bone fragment adjacent to the radial corner of left long finger proximal phalanx base, likely due to an old injury. Osteocartilaginous body about the right 3rd metacarpal head. Mild scattered degenerative arthritis of the remaining hands and wrists.     Physical Exam   /81 (BP Location: Right arm, Patient Position: Sitting)   Pulse 71   Resp 16   Ht 1.676 m (5' 6\")   Wt 99.3 " kg (219 lb)   SpO2 96%   BMI 35.35 kg/m      Constitutional: Appears well-developed and well-nourished. Cooperative. No acute distress.   HENT:   Head: Normocephalic and atraumatic.   Eyes: Conjunctivae are normal.  Neck: Neck supple. No tracheal deviation present.  Cardiovascular: Normal rate and regular rhythm.    Pulmonary/Chest: Effort normal and breath sounds normal.  Abdominal: Exhibits no obvious distension.   Musculoskeletal: Able to move all extremities.  No involuntary motor movements. No axial or paraspinal C/T/L spine tenderness to palpation.  +TTP in the right > left lower sacral region, mild with sensitivity/pressure of the tailbone.    Cervical flexion-extension range of motion: FROM w/o significant pain  Lumbar flexion/extension range of motion: flexion with pain in low back and own the back of legs just past the knee on the right side.  Twisting with pain in back bilaterally.  Skin: Skin is warm, dry and intact.   Psychiatric: Normal mood and affect. Speech is normal and behavior is normal.    Neurological:  Alert, NAD, and oriented to person, place, and time.   No cranial nerve deficit   Gait: symmetric, steady.  No assistive devices.    Strength (L/R)  5/5 Deltoid  5/5 Bicep  5/5 Tricep  5/5 Finger Abduction  5/5 Handgrip (right handed)    5/5- Hip Flexion (performed in supine/sitting requires more exertion on the right)  5/5 Knee Extension  5/5 Ankle Dorsiflexion  5/5 Extensor Hallucis Longus  5/5 Plantar Flexion    Reflexes (L/R)  1+/1+ Bicep  1+/1+ Brachioradialis  1+/1+ Patellar  1+/1+ Ankle    No Lhermitte's  No Spurling's  No Kurtis's   No ankle clonus    Sensation: reduced sensation and tingling in right leg and foot - nondermatomal.  Tingling in left lateral forearm and hand.      Sacroiliac Joint Exam LEFT RIGHT   Fani Finger Test + ++   PSIS tenderness - +   ThighThrust - +   DEVIN - Notes outer hip pain   Pelvic Gapping - +   Pelvic Compression - +   Gaenslen s - -   Sacral Thrust  - -         Review of Systems   See HPI     No past medical history on file.    Past Surgical History:   Procedure Laterality Date     COLONOSCOPY N/A 2024    Procedure: COLONOSCOPY, FLEXIBLE, WITH LESION REMOVAL USING SNARE;  Surgeon: Mason Herrera MD;  Location: [facility]     IR LUMBAR PUNCTURE  2021     IR LUMBAR PUNCTURE  2021     IR LUMBAR PUNCTURE  2022       Social History     Socioeconomic History     Marital status:    Tobacco Use     Smoking status: Former     Current packs/day: 0.00     Types: Cigarettes     Quit date: 2002     Years since quittin.7     Passive exposure: Past     Smokeless tobacco: Never   Vaping Use     Vaping status: Never Used   Substance and Sexual Activity     Alcohol use: Not Currently     Drug use: Yes     Types: Marijuana     Comment: has marijuana card     Social Determinants of Health     Financial Resource Strain: Low Risk  (3/20/2024)    Financial Resource Strain      Within the past 12 months, have you or your family members you live with been unable to get utilities (heat, electricity) when it was really needed?: No   Food Insecurity: No Food Insecurity (2024)    Received from [facility]    Hunger Vital Sign      Worried About Running Out of Food in the Last Year: Never true      Ran Out of Food in the Last Year: Never true   Transportation Needs: No Transportation Needs (2024)    Received from [facility]    PRAPARE - Transportation      Lack of Transportation (Medical): No      Lack of Transportation (Non-Medical): No   Physical Activity: Insufficiently Active (2024)    Received from [facility]    Exercise Vital Sign      Days of Exercise per Week: 7 days      Minutes of Exercise per Session: 10 min   Stress: Stress Concern Present (3/20/2024)    [employer] [place] [employer] - Occupational Stress Questionnaire      Feeling of Stress : To some extent   Social Connections: Unknown (3/20/2024)    Social  Connection and Isolation Panel [NHANES]      Frequency of Social Gatherings with Friends and Family: Twice a week   Housing Stability: Low Risk  (4/17/2024)    Received from [facility]    Housing Stability      What is your living situation today?: I have a steady place to live       family history is not on file.       Brigid Anne PA-C  Department of Neurosurgery  Office: [phone]        Again, thank you for allowing me to participate in the care of your patient.      Sincerely,    Brigid Anne PA-C

## 2024-10-08 NOTE — PATIENT INSTRUCTIONS
Repeat Sacral MRI w/wo in 6 months to monitor cystic lesion in sacral area (which is favored to be a Tarlov Cyst, a benign lesion).  Please advise of any changes that are concerning to you, which may warrant an earlier exam.      Recommend B12 and Folate labs to check levels given you have numbness/tingling in your feet.  Orders placed for you to get when you are able.

## 2024-10-08 NOTE — PROGRESS NOTES
[facility] [address] [address]  Phone: [phone]  Fax: [phone]      Patient:  Robson Galeano, Date of birth 1970, 54 year old, male      ASSESSMENT & PLAN:  Patient presents today because of abnormal MRI showing a likely Tarlov cyst on the left at the S3 level.  This is an incidental finding and he does not appear to by symptomatic from this finding.  No prior images to show if there has been a change, so recommend 6 month f/u with sacrum MRI w/wo.  Patient does have some non dermatomal numbness in his left hand, right leg/foot on exam today.  His pain in the right buttock/leg with flexion and several SI joint provocative tests indicate he has right SI joint sacroiliitis.      We discussed that the presumed Tarlov cyst is a benign lesion and usually does not require any interventions.  There are no good interventions that are available for these lesions.      -f/u 2-3 days after getting sacrum MRI w/wo in 6 months by video.    -Folate and B12 levels to r/o deficiencies that may be contributing to paresthesias in extremities. This can be done at his convenience and added onto blood work that he gets on a regular basis.   -Could consider a right SI joint injection in the future if this becomes more bothersome to him.       I spent 104 minutes spent in patient care, independent review and interpretation of medical records/imaging, reviewing old records.    History of Present Illness:    10/08/2024 Visit: - referred by Dr. Bradley PM&R for  Tarlov cyst/ Lumbar radiculopathy     Per Chart and Referring provider's note 6/6/24 - patient experiencing constant urge to defecate, occasional urinary incontinence (worse with flexion), BLE pain (mostly posterior leg to knee level, but sometimes to feet), feet n/t and occasional weakness in legs.  Lumbar XR w/ T11 wedging and L5 pares defects, L1-3 1 mm retrolisthesis w/o instability.  MRI  lumbar w/ sev R L4/5 NFS; mod L3-5 left NFS; sacral MRI w/ fusiform cystic lesion on left L3 NR presumably a perineural cyst; S3 unilateral cyst.  EMG was recommended to r/o neuropathy and possible L4/5 arin TFESI as well as repeating the sacral MRI in 6 months to monitor cystic structure.  Last PT at  in 2021 aggravated sxs.  No significant benefit w/ tylenol, ibu, oral steroids, amitriptyline, gabapentin. L3-5 arin RFA in 2022 and 2023 that was very helpful with lbp.  His exam was neuro intact.    PMH RA/psoriasis (follows w/ rheumatology at [facility]; on immunosuppressant therapy), RACHELL (CPAP), mild emphysema (former smoker).      Patient notes pain with his sciatica nerve, mostly on the right side (pain down the back of his leg).  He has had chronic back pain and has undergone 3-4 RFA which have been helpful for his back pain.  He had workup that found a Tarlov Cyst.  He notes constipation/diarrhea issues for about 1 year.  He switched over to injectable Methotrexate about 6 months ago and has improved with his abdominal issues (prior with oral had 3 bouts of diverticulitis).  He notes improved function of bladder with Flomax.  He sometimes gets the sensation that he needs to have a BM, but then doesn't really need to go.  It is not abnormal for him to have BM 4-5 times per day.  Going to brush his teeth and leaning forward causes pressure on his bladder area and increases sensation to have to urinate.  He notes a squeezing sensation in his sacral area that is slightly more eccentric to the right side for the past 8-9 months which he gets about 2 times per week.  He indicates an occasional twinge in his left leg and a stabbing sensation in his low back for now reason.  He notices more problems in the morning and with getting up in his low back.  He notes occasional n/t in his feet in the last 6-8 months.  He notes associated curling of his feet and altered walking pattern when this occurs.  He notes this occurs  more with prolonged sitting and bouncing around in the bobcat or lawnmower.        Conservative Treatment:  Medical marijuana  Gabapentin 300 mg bid - tried tid but unable to tolerated b/c of sedative/cognitive effect  Amitriptyline      Tobacco use:  Former (cessation 2022)    Alcohol consumption - Drinks 6-pack on some weekends; social drinker.  Does not drink every night.     IMAGING   Imaging independently reviewed.    EMG/NCS BLE 6/17/24 - Interpretation: Normal study   1. There is no electrodiagnostic evidence of lumbosacral radiculopathy, lumbosacral plexopathy, or focal neuropathy in the bilateral lower extremities.     XR Lumbar Bending Only 2/3 Views  Result Date: 6/4/2024  EXAM: XR LUMBAR FLEX/EXT 2/3 VIEWS LOCATION: [facility] DATE: 6/3/2024 INDICATION: Chronic bilateral low back pain without sciatica. COMPARISON: 4/23/2024 MR lumbar spine.   IMPRESSION: Flexion and extension views of the lower thoracic spine and lumbar spine. Five lumbar segments. Mild chronic anterior wedging T11. Interspace narrowing and adjacent osteophytes mid lumbar levels. Lower lumbar spine facet arthropathy. Satisfactory sacral alignment. L5 pars interarticularis defects minimally displaced with adjacent hypertrophic changes. 1 mm retrolisthesis L1-L2 and L2-L3 unchanged in flexion and extension with no high-grade instability noted. No significant subluxation/spondylolisthesis present at L5-S1.    CT Chest w/o Contrast  Result Date: 5/17/2024  EXAM: CT CHEST W/O CONTRAST LOCATION: [facility] DATE: 5/17/2024 INDICATION:  Abnormal chest x-ray; COMPARISON: The chest radiograph describing the abnormality was not immediately available for review. The report from a chest radiograph performed 4/24/2024 from [facility] was available and reviewed. TECHNIQUE: CT chest without IV contrast. Multiplanar reformats were obtained. Dose reduction techniques were used. CONTRAST: None.  FINDINGS: LUNGS AND PLEURA: Mild paraseptal emphysema is present in the apices. Focal confluent subpleural bulla present along the anterior apical pleura of the left upper lobe (series 3, image 59). Small territories of subpleural opacity are present along the posterior costal pleura of the lower lobes consistent with positional atelectasis. No airspace opacities. No pleural space abnormality. The report of the chest radiograph described a potential abnormality in the left lateral costophrenic angle. No opacity or lesion in this location. MEDIASTINUM: Cardiac chambers are normal in size. No pericardial effusion. Normal caliber thoracic aorta. No aortic or great vessel atheromatous calcifications. Esophagus is decompressed. No lymphadenopathy. The thyroid gland is normal. CORONARY ARTERY CALCIFICATION: None. UPPER ABDOMEN: Hepatic steatosis. MUSCULOSKELETAL: Disc space narrowing and discogenic sclerosis of T7-T8. Small marginal osteophytes at other mid and lower thoracic spine levels. No aggressive or destructive bone lesions. Several healed upper left rib fracture deformities.   IMPRESSION: 1.  No parenchymal abnormalities in the left lateral costophrenic sulcus, questioned on recent chest radiographs. 2.  Upper lung predominant emphysema. If patient is amenable and meets screening criteria, consider annual low-dose CT chest for lung cancer screening. 3.  Hepatic steatosis.     MR Sacrum and Coccyx w/o & w Contrast  Result Date: 5/9/2024  MR sacrum  without and with contrast 5/9/2024 8:26 AM Techniques: Multiplanar multisequence imaging of the sacrum was obtained before and after administration of intravenous contrast using tumor/infection protocol. Contrast: 10 mL Gadavist History: Neoplasm of sacral spinal nerve; Chronic bilateral low back pain without sciatica; Chronic bilateral low back pain without sciatica Comparison: CT abdomen pelvis 1/8/2024. Lumbar spine MR 4/23/2024 Findings: Rectosigmoid endoscopy clips  with associated susceptibility artifact which degrades evaluation of the adjacent soft tissues. Osseous structures Osseous structures: No fracture, stress reaction, avascular necrosis, or focal osseous lesion is seen. Chronic pars defects at L5. Joint and periarticular soft tissue Internal derangement of joints are not well assessed owing to chosen field of view. Partially visualized subchondral cystic change in the bilateral acetabula with paralabral cyst formation about the left acetabulum, indicative of overlying labral tear. Mild degenerative changes of the sacroiliac joints. Degenerative disc disease at L4-5. Muscles and tendons Muscles: Visualized muscles are unremarkable without evidence of muscle strain, atrophy or mass. Tendons: The visualized tendons are intact. Nerves: 2.4 x 1.7 x 2.4 cm fusiform T1 hypointense, T2 hyperintense cystic lesion closely related to the left S3 sacral nerve. No enhancement on postcontrast sequences. Other Findings: None.   Impression: Examination degraded by metallic artifact related to endoscopy clip. 2.4 cm nonenhancing T2 hyperintense cystic structure intimately associated with the left S3 nerve distal to the sacral neural foramina, most likely representing a Tarlov cyst. I have personally reviewed the examination and initial interpretation and I agree with the findings. LUCAS DEJESUS MD (Joe)   SYSTEM ID:  [id number]    MR Lumbar Spine w/o & w Contrast  Result Date: 4/24/2024  EXAM: MR LUMBAR SPINE W/O and W CONTRAST LOCATION: [facility] DATE: 4/23/2024 INDICATION:  Neoplasm of sacral spinal nerve, Chronic bilateral low back pain without sciatica, Chronic bilateral low back pain without sciatica COMPARISON: 05/28/2021. CONTRAST: 10ml gadavist TECHNIQUE: Routine Lumbar Spine MRI without and with IV contrast. FINDINGS: Degenerative endplate changes with Modic type I degenerative endplate changes at L3-L4 enhancement findings on postcontrast  "imaging in this area as well. Conus terminates normally. Straightening of the normal lumbar lordosis. L1-L2: No canal stenosis. No significant foraminal narrowing. Facet disease. L2-L3: Broad-based disc bulge eccentric to the left. Moderate left foraminal narrowing. L3-L4: Broad-based disc bulge. Mild canal stenosis. Facet disease. Mild right and moderate left foraminal narrowing. L4-L5: Broad-based disc bulge. Mild canal stenosis. Facet disease. Severe right and moderate left foraminal narrowing. L5-S1: Broad-based disc bulge without canal stenosis. Facet disease. No significant foraminal narrowing.   IMPRESSION: 1.  Modic type I degenerative endplate changes at L3-L4. The left sacral lesion identified on prior CT abdomen pelvis from 01/08/2024 or is not visualized on this examination but this acquisition does not include the entirety of the sacral distribution. Recommend dedicated sacrum/sacroiliac MR without and with contrast.    DX Hand Bilateral 3+ Views  Result Date: 4/23/2024  EXAM:  DX HAND BILATERAL 3+ VIEWS  Advanced degenerative arthritis of the right 3rd MCP joint and bilateral STT joints. Moderate degenerative arthritis of the left 3rd MCP joint with surrounding subchondral cystic changes. Small chronic ununited bone fragment adjacent to the radial corner of left long finger proximal phalanx base, likely due to an old injury. Osteocartilaginous body about the right 3rd metacarpal head. Mild scattered degenerative arthritis of the remaining hands and wrists.     Physical Exam   /81 (BP Location: Right arm, Patient Position: Sitting)   Pulse 71   Resp 16   Ht 1.676 m (5' 6\")   Wt 99.3 kg (219 lb)   SpO2 96%   BMI 35.35 kg/m      Constitutional: Appears well-developed and well-nourished. Cooperative. No acute distress.   HENT:   Head: Normocephalic and atraumatic.   Eyes: Conjunctivae are normal.  Neck: Neck supple. No tracheal deviation present.  Cardiovascular: Normal rate and regular rhythm. "    Pulmonary/Chest: Effort normal and breath sounds normal.  Abdominal: Exhibits no obvious distension.   Musculoskeletal: Able to move all extremities.  No involuntary motor movements. No axial or paraspinal C/T/L spine tenderness to palpation.  +TTP in the right > left lower sacral region, mild with sensitivity/pressure of the tailbone.    Cervical flexion-extension range of motion: FROM w/o significant pain  Lumbar flexion/extension range of motion: flexion with pain in low back and own the back of legs just past the knee on the right side.  Twisting with pain in back bilaterally.  Skin: Skin is warm, dry and intact.   Psychiatric: Normal mood and affect. Speech is normal and behavior is normal.    Neurological:  Alert, NAD, and oriented to person, place, and time.   No cranial nerve deficit   Gait: symmetric, steady.  No assistive devices.    Strength (L/R)  5/5 Deltoid  5/5 Bicep  5/5 Tricep  5/5 Finger Abduction  5/5 Handgrip (right handed)    5/5- Hip Flexion (performed in supine/sitting requires more exertion on the right)  5/5 Knee Extension  5/5 Ankle Dorsiflexion  5/5 Extensor Hallucis Longus  5/5 Plantar Flexion    Reflexes (L/R)  1+/1+ Bicep  1+/1+ Brachioradialis  1+/1+ Patellar  1+/1+ Ankle    No Lhermitte's  No Spurling's  No Kurtis's   No ankle clonus    Sensation: reduced sensation and tingling in right leg and foot - nondermatomal.  Tingling in left lateral forearm and hand.      Sacroiliac Joint Exam LEFT RIGHT   Fani Finger Test + ++   PSIS tenderness - +   ThighThrust - +   DEVIN - Notes outer hip pain   Pelvic Gapping - +   Pelvic Compression - +   Gaenslen s - -   Sacral Thrust - -         Review of Systems   See HPI     No past medical history on file.    Past Surgical History:   Procedure Laterality Date    COLONOSCOPY N/A 1/23/2024    Procedure: COLONOSCOPY, FLEXIBLE, WITH LESION REMOVAL USING SNARE;  Surgeon: Mason Herrera MD;  Location: [facility]    IR LUMBAR PUNCTURE   2021    IR LUMBAR PUNCTURE  2021    IR LUMBAR PUNCTURE  2022       Social History     Socioeconomic History    Marital status:    Tobacco Use    Smoking status: Former     Current packs/day: 0.00     Types: Cigarettes     Quit date: 2002     Years since quittin.7     Passive exposure: Past    Smokeless tobacco: Never   Vaping Use    Vaping status: Never Used   Substance and Sexual Activity    Alcohol use: Not Currently    Drug use: Yes     Types: Marijuana     Comment: has marijuana card     Social Determinants of Health     Financial Resource Strain: Low Risk  (3/20/2024)    Financial Resource Strain     Within the past 12 months, have you or your family members you live with been unable to get utilities (heat, electricity) when it was really needed?: No   Food Insecurity: No Food Insecurity (2024)    Received from [facility]    Hunger Vital Sign     Worried About Running Out of Food in the Last Year: Never true     Ran Out of Food in the Last Year: Never true   Transportation Needs: No Transportation Needs (2024)    Received from [facility]    PRAPARE - Transportation     Lack of Transportation (Medical): No     Lack of Transportation (Non-Medical): No   Physical Activity: Insufficiently Active (2024)    Received from [facility]    Exercise Vital Sign     Days of Exercise per Week: 7 days     Minutes of Exercise per Session: 10 min   Stress: Stress Concern Present (3/20/2024)    [place] [employer] - Occupational Stress Questionnaire     Feeling of Stress : To some extent   Social Connections: Unknown (3/20/2024)    Social Connection and Isolation Panel [NHANES]     Frequency of Social Gatherings with Friends and Family: Twice a week   Housing Stability: Low Risk  (2024)    Received from [facility]    Housing Stability     What is your living situation today?: I have a steady place to live       family history is not on file.       Brigid  JUSTIN Anne  Department of Neurosurgery  Office: [phone]

## 2024-10-09 ENCOUNTER — OFFICE VISIT (OUTPATIENT)
Dept: SLEEP MEDICINE | Facility: CLINIC | Age: 54
End: 2024-10-09
Attending: PHYSICIAN ASSISTANT
Payer: COMMERCIAL

## 2024-10-09 VITALS
BODY MASS INDEX: 35.2 KG/M2 | HEIGHT: 66 IN | HEART RATE: 66 BPM | OXYGEN SATURATION: 96 % | DIASTOLIC BLOOD PRESSURE: 82 MMHG | SYSTOLIC BLOOD PRESSURE: 128 MMHG | WEIGHT: 219 LBS

## 2024-10-09 DIAGNOSIS — G47.33 OSA (OBSTRUCTIVE SLEEP APNEA): Primary | ICD-10-CM

## 2024-10-09 PROCEDURE — 99243 OFF/OP CNSLTJ NEW/EST LOW 30: CPT | Performed by: PHYSICIAN ASSISTANT

## 2024-10-09 ASSESSMENT — SLEEP AND FATIGUE QUESTIONNAIRES
HOW LIKELY ARE YOU TO NOD OFF OR FALL ASLEEP WHILE SITTING INACTIVE IN A PUBLIC PLACE: SLIGHT CHANCE OF DOZING
HOW LIKELY ARE YOU TO NOD OFF OR FALL ASLEEP WHILE SITTING QUIETLY AFTER LUNCH WITHOUT ALCOHOL: MODERATE CHANCE OF DOZING
HOW LIKELY ARE YOU TO NOD OFF OR FALL ASLEEP WHILE LYING DOWN TO REST IN THE AFTERNOON WHEN CIRCUMSTANCES PERMIT: HIGH CHANCE OF DOZING
HOW LIKELY ARE YOU TO NOD OFF OR FALL ASLEEP WHILE WATCHING TV: HIGH CHANCE OF DOZING
HOW LIKELY ARE YOU TO NOD OFF OR FALL ASLEEP WHILE SITTING AND TALKING TO SOMEONE: SLIGHT CHANCE OF DOZING
HOW LIKELY ARE YOU TO NOD OFF OR FALL ASLEEP WHILE SITTING AND READING: HIGH CHANCE OF DOZING
HOW LIKELY ARE YOU TO NOD OFF OR FALL ASLEEP WHEN YOU ARE A PASSENGER IN A CAR FOR AN HOUR WITHOUT A BREAK: MODERATE CHANCE OF DOZING
HOW LIKELY ARE YOU TO NOD OFF OR FALL ASLEEP IN A CAR, WHILE STOPPED FOR A FEW MINUTES IN TRAFFIC: WOULD NEVER DOZE

## 2024-10-09 NOTE — PROGRESS NOTES
Outpatient Sleep Medicine Consultation:      Name: Robson Galeano MRN# [id number]   Age: 54 year old YOB: 1970     Date of Consultation: October 9, 2024  Consultation is requested by: Jace Campbell PA-C  [address] [place] [id number] Jace Campbell  Primary care provider: Nupur Torres       Reason for Sleep Consult:     Robson Galeano is sent by Jace Campbell for a sleep consultation regarding obstructive sleep apnea .    Patient s Reason for visit  Robson Galeano main reason for visit: Check machine get repayment parts  Patient states problem(s) started: 2007  Robson Galeano's goals for this visit:             Assessment and Plan:     Summary Sleep Diagnoses & Recommendations:   Severe obstructive sleep apnea-  Excellent CPAP compliance and AHI is well controlled on CPAP 11 cm/H20. Daytime symptoms are improved.   Continue current treatment.   Comprehensive DME order placed.       Comorbid Diagnoses:  Obesity  HTN    Summary Recommendations:  Orders Placed This Encounter   Procedures    Comprehensive DME         Summary Counseling:    Obstructive Sleep Apnea Reviewed  Complications of Untreated Sleep Apnea Reviewed      Medical Decision-making:   Educational materials provided in instructions    Total time spent reviewing medical records, history and physical examination, review of previous testing and interpretation as well as documentation on this date:30 minutes    CC: Jace Campbell          History of Present Illness:     Chief Complaint   Patient presents with    Sleep Problem     Establishing care and needs new cpap supplies.        Robson Galeano is a 54 year old male with history of severe obstructive sleep apnea.     Sleep study done at [facility] on 9/27/2018 (219#)-AHI 86, RDI -,  lowest oxygen saturation was 62%, CPAP 11 cm/H20    Do you use a CPAP Machine at home:  yes  Overall, on a scale of 0-10 how would you rate your CPAP (0 poor, 10 great):  8    What  type of mask do you use:  nasal pillow  Is your mask comfortable:  yes  If not, why:      Is your mask leaking:  no  If yes, where do you feel it:    How many night per week does the mask leak (0-7):      Do you notice snoring with mask on:  no  Do you notice gasping arousals with mask on:  no  Are you having significant oral or nasal dryness:  yes, some  Is the pressure setting comfortable:  yes  If not, why:    Do you feel well rested in the morning:        ResMed   CPAP 11 cmH2O 30 day usage data:  100% of days with > 4 hours of use. 0/30 days with no use.   Average use 8 hours and 26 minutes per day.   95%ile Leak 10.2 L/min.   AHI 1.2 events per hour.         SLEEP-WAKE SCHEDULE:     Work/School Days: Patient goes to school/work: No   Usually gets into bed at 10pm  Takes patient about Not long to fall asleep  Has trouble falling asleep 2 nights per week  Wakes up in the middle of the night Always times.  Wakes up due to    He has trouble falling back asleep   times a week.   It usually takes   to get back to sleep  Patient is usually up at 630 7  Uses alarm: No    Weekends/Non-work Days/All Other Days:  Usually gets into bed at 10   Takes patient about Not long to fall asleep  Patient is usually up at 7  Uses alarm: No    Sleep Need  Patient gets  6hrs sleep on average   Patient thinks he needs about 8hrs sleep    Robson Statonigley prefers to sleep in this position(s): Side   Patient states they do the following activities in bed:      Naps  Patient takes a purposeful nap 0 times a week and naps are usually 20minutes in duration  He feels better after a nap:    He dozes off unintentionally 7 days per week  Patient has had a driving accident or near-miss due to sleepiness/drowsiness: No      SLEEP DISRUPTIONS:    Breathing/Snoring  Patient snores:Yes  Other people complain about his snoring: Yes  Patient has been told he stops breathing in his sleep:Yes  He has issues with the following:      Movement:  Patient  gets pain, discomfort, with an urge to move:  Yes  It happens when he is resting:  Yes  It happens more at night:  Yes  Patient has been told he kicks his legs at night:  No     Behaviors in Sleep:  Robson Galeano has experienced the following behaviors while sleeping: Sleep-talking  He has experienced sudden muscle weakness during the day: No      Is there anything else you would like your sleep provider to know:        CAFFEINE AND OTHER SUBSTANCES:    Patient consumes caffeinated beverages per day:  3  Last caffeine use is usually: 5pm  List of any prescribed or over the counter stimulants that patient takes:    List of any prescribed or over the counter sleep medication patient takes:    List of previous sleep medications that patient has tried:    Patient drinks alcohol to help them sleep: No  Patient drinks alcohol near bedtime: No    Family History:  Patient has a family member been diagnosed with a sleep disorder: No            SCALES:    EPWORTH SLEEPINESS SCALE         10/9/2024    11:04 AM    Reynolds Sleepiness Scale ( MARICRUZ Vargas  [id number]<br>ESS - [place]/English - Final version - 21 Nov 07 - [facility] [place].)   Sitting and reading High chance of dozing   Watching TV High chance of dozing   Sitting, inactive in a public place (e.g. a theatre or a meeting) Slight chance of dozing   As a passenger in a car for an hour without a break Moderate chance of dozing   Lying down to rest in the afternoon when circumstances permit High chance of dozing   Sitting and talking to someone Slight chance of dozing   Sitting quietly after a lunch without alcohol Moderate chance of dozing   In a car, while stopped for a few minutes in traffic Would never doze   Reynolds Score (MC) 15   Reynolds Score (Sleep) 15         INSOMNIA SEVERITY INDEX (SORAYA)          10/9/2024    10:55 AM   Insomnia Severity Index (SORAYA)   Difficulty falling asleep 1   Difficulty staying asleep 2   Problems waking up too early 1   How  "SATISFIED/DISSATISFIED are you with your CURRENT sleep pattern? 2   How NOTICEABLE to others do you think your sleep problem is in terms of impairing the quality of your life? 2   How WORRIED/DISTRESSED are you about your current sleep problem? 2   To what extent do you consider your sleep problem to INTERFERE with your daily functioning (e.g. daytime fatigue, mood, ability to function at work/daily chores, concentration, memory, mood, etc.) CURRENTLY? 2   SORAYA Total Score 12       Guidelines for Scoring/Interpretation:  Total score categories:  0-7 = No clinically significant insomnia   8-14 = Subthreshold insomnia   15-21 = Clinical insomnia (moderate severity)  22-28 = Clinical insomnia (severe)  Used via courtesy of www.SKY MobileMediath.va.gov with permission from Erich Turner PhD., [facility]      STOP BANG         10/9/2024     3:00 PM   STOP BANG Questionnaire (  2008, the American Society of Anesthesiologists, Inc. [employer]   Neck Cir (cm) Clinic: 50 cm   B/P Clinic: 128/82   BMI Clinic: 35.36         GAD7         No data to display                  CAGE-AID         No data to display                CAGE-AID reprinted with permission from the [place] Medical Journal, TRACI Edwards. and LES Newby, \"Conjoint screening questionnaires for alcohol and drug abuse\" [place] Medical Journal 94: 135-140, 1995.      PATIENT HEALTH QUESTIONNAIRE-9 (PHQ - 9)         No data to display                Developed by Jose Miguel Mendez, Mayra Dumont, Chepe Friend and colleagues, with an educational moni from [employer] No permission required to reproduce, translate, display or distribute.        Allergies:    Allergies   Allergen Reactions    Bee Venom Other (See Comments)     Hallucinates, no anaphylaxis           Medications:    Current Outpatient Medications   Medication Sig Dispense Refill    amitriptyline (ELAVIL) 10 MG tablet Take 1 tablet (10 mg) by mouth at bedtime 90 tablet " "3    B-D TB SYRINGE 27G X 1/2\" 1 ML MISC       Filter Needles (BD FILTER NEEDLE) 18G X 1-1/2\" MISC USE ONCE WEEKLY WITH TESTOSTERONE 12 each 3    folic acid (FOLVITE) 1 MG tablet Take 1 mg by mouth      gabapentin (NEURONTIN) 300 MG capsule Take 1 capsule (300 mg) by mouth 2 times daily 180 capsule 3    methotrexate sodium, PF, 50 MG/2ML SOLN injection Inject 15 mg Subcutaneous      Probiotic Product (FORTIFY DAILY PROBIOTIC PO)       Syringe/Needle, Disp, 23G X 1-1/2\" 3 ML MISC 1 each once a week 12 each 3    tamsulosin (FLOMAX) 0.4 MG capsule Take 1 capsule (0.4 mg) by mouth daily 90 capsule 3    testosterone cypionate (DEPOTESTOSTERONE) 100 MG/ML injection Inject 1 mL (100 mg) into the muscle once a week 4 mL 5    valACYclovir (VALTREX) 1000 mg tablet Take 1 tablet (1,000 mg) by mouth daily 5 tablet 5    valACYclovir (VALTREX) 500 MG tablet Take 1 tablet (500 mg) by mouth daily 90 tablet 3       Problem List:  Patient Active Problem List    Diagnosis Date Noted    Depression 01/08/2024     Priority: Medium    Essential hypertension 01/08/2024     Priority: Medium    RACHELL (obstructive sleep apnea) 01/08/2024     Priority: Medium     Sleep [facility] done at [facility] on 9/27/2018 (219#)-AHI 86, RDI -,  lowest oxygen saturation was 62%, CPAP 11 cm/H20       Polyarthritis 01/08/2024     Priority: Medium    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     Priority: Medium    Genital herpes simplex, unspecified site 10/24/2023     Priority: Medium    Chronic bilateral low back pain without sciatica 03/02/2023     Priority: Medium    Benign prostatic hyperplasia, unspecified whether lower urinary tract symptoms present 03/02/2023     Priority: Medium    Hypogonadism male 07/30/2021     Priority: Medium    Non-recurrent unilateral inguinal hernia without obstruction or gangrene 01/26/2021     Priority: Medium    Seropositive rheumatoid arthritis (H) 12/28/2019     Priority: Medium    Low testosterone in male 09/29/2019     " Priority: Medium    Ocular migraine 2012     Priority: Medium     Formatting of this note might be different from the original.  Rare-last 10-11      Morbid obesity (H) 2012     Priority: Medium     Formatting of this note might be different from the original.          Past Medical/Surgical History:  No past medical history on file.  Past Surgical History:   Procedure Laterality Date    COLONOSCOPY N/A 2024    Procedure: COLONOSCOPY, FLEXIBLE, WITH LESION REMOVAL USING SNARE;  Surgeon: Mason Herrera MD;  Location: [facility]    IR LUMBAR PUNCTURE  2021    IR LUMBAR PUNCTURE  2021    IR LUMBAR PUNCTURE  2022       Social History:  Social History     Socioeconomic History    Marital status:      Spouse name: Not on file    Number of children: Not on file    Years of education: Not on file    Highest education level: Not on file   Occupational History    Not on file   Tobacco Use    Smoking status: Former     Current packs/day: 0.00     Types: Cigarettes     Quit date: 2002     Years since quittin.7     Passive exposure: Past    Smokeless tobacco: Never   Vaping Use    Vaping status: Never Used   Substance and Sexual Activity    Alcohol use: Not Currently    Drug use: Yes     Types: Marijuana     Comment: has marijuana card    Sexual activity: Not on file   Other Topics Concern    Not on file   Social History Narrative    Not on file     Social Determinants of Health     Financial Resource Strain: Low Risk  (3/20/2024)    Financial Resource Strain     Within the past 12 months, have you or your family members you live with been unable to get utilities (heat, electricity) when it was really needed?: No   Food Insecurity: No Food Insecurity (2024)    Received from [facility]    Hunger Vital Sign     Worried About Running Out of Food in the Last Year: Never true     Ran Out of Food in the Last Year: Never true   Transportation Needs: No Transportation Needs  (4/17/2024)    [facility]    PRAPARE - Transportation     Lack of Transportation (Medical): No     Lack of Transportation (Non-Medical): No   Physical Activity: Insufficiently Active (4/17/2024)    [facility]    Exercise Vital Sign     Days of Exercise per Week: 7 days     Minutes of Exercise per Session: 10 min   Stress: Stress Concern Present (3/20/2024)    [place] [employer] - Occupational Stress Questionnaire     Feeling of Stress : To some extent   Social Connections: Unknown (3/20/2024)    Social Connection and Isolation Panel [NHANES]     Frequency of Communication with Friends and Family: Not on file     Frequency of Social Gatherings with Friends and Family: Twice a week     Attends Denominational Services: Not on file     Active Member of Clubs or Organizations: Not on file     Attends Club or Organization Meetings: Not on file     Marital Status: Not on file   Interpersonal Safety: Not on file   Housing Stability: Low Risk  (4/17/2024)    [facility]    Housing Stability     What is your living situation today?: I have a steady place to live       Family History:  No family history on file.    Review of Systems:  A complete review of systems reviewed by me is negative with the exeption of what has been mentioned in the history of present illness.  In the last TWO WEEKS have you experienced any of the following symptoms?  Fevers: No  Pain at Night: No  Difficulty Breathing through Nose: No  Sore Throat in Morning: No  Dry Mouth in the Morning: No  Shortness of Breath Lying Flat: No  Shortness of Breath With Activity: No  Awakening with Shortness of Breath: No  Increased Cough: No  Heart Racing at Night: No  Swelling in Feet or Legs: Yes  Diarrhea at Night: Yes  Urinating More than Once at Night: No  Losing Control of Urine at Night: No  Joint Pains at Night: Yes  Headaches in Morning: No  Weakness in Arms or Legs: No  Depressed Mood: No  Anxiety:  "No     Physical Examination:  Vitals: /82   Pulse 66   Ht 1.676 m (5' 5.98\")   Wt 99.3 kg (219 lb)   SpO2 96%   BMI 35.36 kg/m    BMI= Body mass index is 35.36 kg/m .    Neck Cir (cm): 50 cm             Data: All pertinent previous laboratory data reviewed     Recent Labs   Lab Test 09/07/24  1401 06/17/24  0929 01/30/24  0751 01/08/24  0921 11/21/23  0724 11/14/23  1611   NA  --   --   --  144  --  143   POTASSIUM  --   --   --  3.9  --  3.9   CHLORIDE  --   --   --  109*  --  108*   CO2  --   --   --  24  --  25   ANIONGAP  --   --   --  11  --  10   GLC  --   --   --  109*  --  83   BUN  --   --   --  13.5  --  12.9   CR 1.08 1.14   < > 0.94   < > 0.89   JEF  --   --   --  9.2  --  8.7    < > = values in this interval not displayed.       Recent Labs   Lab Test 09/07/24  1401   WBC 3.8*   RBC 4.69   HGB 14.3   HCT 43.9   MCV 94   MCH 30.5   MCHC 32.6   RDW 13.3          Recent Labs   Lab Test 09/07/24  1401 06/17/24  0929 05/22/24  0855 01/30/24  0751 01/08/24  0921   PROTTOTAL  --   --   --   --  7.0   ALBUMIN  --   --  4.2   < > 4.6   BILITOTAL  --   --   --   --  0.8   ALKPHOS  --   --   --   --  71   AST 38   < > 21   < > 18   ALT 46   < > 37   < > 39    < > = values in this interval not displayed.     Chest x-ray:   XR Chest 2 Views 04/24/2024    Narrative  EXAM:  DX CHEST AP OR PA AND LATERAL 2 VIEWS    Impression  Small subpleural nodular opacity in the left lateral costophrenic angle is indeterminate but may represent focal nodular scarring. Comparison with the patient's prior outside abdominal CT exams documented in Care Everywhere (most recently  1/8/2024) suggested as the first step in further evaluation. If new or indeterminate, CT chest would be suggested for further evaluation. Mild blunting of the posterior costophrenic angles may represent pleural thickening or trace effusions. Mildly  tortuous aorta. Healed left rib fractures. Mild degenerative change thoracic spine.      Chest " CT:   CT Chest w/o Contrast 05/17/2024    Narrative  EXAM: CT CHEST W/O CONTRAST  LOCATION: [facility]  DATE: 5/17/2024    INDICATION:  Abnormal chest x-ray;  COMPARISON: The chest radiograph describing the abnormality was not immediately available for review. The report from a chest radiograph performed 4/24/2024 from [facility] was available and reviewed.  TECHNIQUE: CT chest without IV contrast. Multiplanar reformats were obtained. Dose reduction techniques were used.  CONTRAST: None.    FINDINGS:  LUNGS AND PLEURA: Mild paraseptal emphysema is present in the apices. Focal confluent subpleural bulla present along the anterior apical pleura of the left upper lobe (series 3, image 59). Small territories of subpleural opacity are present along the  posterior costal pleura of the lower lobes consistent with positional atelectasis. No airspace opacities. No pleural space abnormality.    The report of the chest radiograph described a potential abnormality in the left lateral costophrenic angle. No opacity or lesion in this location.    MEDIASTINUM: Cardiac chambers are normal in size. No pericardial effusion. Normal caliber thoracic aorta. No aortic or great vessel atheromatous calcifications. Esophagus is decompressed. No lymphadenopathy. The thyroid gland is normal.    CORONARY ARTERY CALCIFICATION: None.    UPPER ABDOMEN: Hepatic steatosis.    MUSCULOSKELETAL: Disc space narrowing and discogenic sclerosis of T7-T8. Small marginal osteophytes at other mid and lower thoracic spine levels. No aggressive or destructive bone lesions. Several healed upper left rib fracture deformities.    Impression  IMPRESSION:    1.  No parenchymal abnormalities in the left lateral costophrenic sulcus, questioned on recent chest radiographs.  2.  Upper lung predominant emphysema. If patient is amenable and meets screening criteria, consider annual low-dose CT chest for lung cancer screening.  3.  Hepatic  "steatosis.      PFT: Most Recent [facility] Pulmonary Function Testing    FVC-Pred   Date Value Ref Range Status   07/05/2024 3.88 L      FVC-Pre   Date Value Ref Range Status   07/05/2024 4.13 L      FVC-%Pred-Pre   Date Value Ref Range Status   07/05/2024 106 %      FEV1-Pre   Date Value Ref Range Status   07/05/2024 3.06 L      FEV1-%Pred-Pre   Date Value Ref Range Status   07/05/2024 98 %      FEV1FVC-Pred   Date Value Ref Range Status   07/05/2024 80 %      FEV1FVC-Pre   Date Value Ref Range Status   07/05/2024 74 %      No results found for: \"20029\"  FEFMax-Pred   Date Value Ref Range Status   07/05/2024 8.70 L/sec      FEFMax-Pre   Date Value Ref Range Status   07/05/2024 6.83 L/sec      FEFMax-%Pred-Pre   Date Value Ref Range Status   07/05/2024 78 %      ExpTime-Pre   Date Value Ref Range Status   07/05/2024 6.54 sec      FIFMax-Pre   Date Value Ref Range Status   07/05/2024 5.24 L/sec      FEV1FEV6-Pred   Date Value Ref Range Status   07/05/2024 80 %      FEV1FEV6-Pre   Date Value Ref Range Status   07/05/2024 75 %          VICTOR M Campoverde 10/9/2024           "

## 2024-10-09 NOTE — PATIENT INSTRUCTIONS
Your Body mass index is 35.36 kg/m .  Weight management is a personal decision.  If you are interested in exploring weight loss strategies, the following discussion covers the approaches that may be successful. Body mass index (BMI) is one way to tell whether you are at a healthy weight, overweight, or obese. It measures your weight in relation to your height.  A BMI of 18.5 to 24.9 is in the healthy range. A person with a BMI of 25 to 29.9 is considered overweight, and someone with a BMI of 30 or greater is considered obese. More than two-thirds of American adults are considered overweight or obese.  Being overweight or obese increases the risk for further weight gain. Excess weight may lead to heart disease and diabetes.  Creating and following plans for healthy eating and physical activity may help you improve your health.  Weight control is part of healthy lifestyle and includes exercise, emotional health, and healthy eating habits. Careful eating habits lifelong are the mainstay of weight control. Though there are significant health benefits from weight loss, long-term weight loss with diet alone may be very difficult to achieve- studies show long-term success with dietary management in less than 10% of people. Attaining a healthy weight may be especially difficult to achieve in those with severe obesity. In some cases, medications, devices and surgical management might be considered.  What can you do?  If you are overweight or obese and are interested in methods for weight loss, you should discuss this with your provider.   Consider reducing daily calorie intake by 500 calories.   Keep a food journal.   Avoiding skipping meals, consider cutting portions instead.    Diet combined with exercise helps maintain muscle while optimizing fat loss. Strength training is particularly important for building and maintaining muscle mass. Exercise helps reduce stress, increase energy, and improves fitness. Increasing  exercise without diet control, however, may not burn enough calories to loose weight.     Start walking three days a week 10-20 minutes at a time  Work towards walking thirty minutes five days a week   Eventually, increase the speed of your walking for 1-2 minutes at time    In addition, we recommend that you review healthy lifestyles and methods for weight loss available through the National Institutes of Health patient information sites:  http://win.niddk.nih.gov/publications/index.htm    And look into health and wellness programs that may be available through your health insurance provider, employer, local community center, or [employer] club.

## 2024-10-09 NOTE — NURSING NOTE
2 year appointment reminder message was created and will be sent out 2 - 3 months prior to next appointment due date. [employer]

## 2024-10-09 NOTE — NURSING NOTE
"Chief Complaint   Patient presents with    Sleep Problem     Establishing care and needs new cpap supplies.        Initial /82   Pulse 66   Ht 1.676 m (5' 5.98\")   Wt 99.3 kg (219 lb)   SpO2 96%   BMI 35.36 kg/m   Estimated body mass index is 35.36 kg/m  as calculated from the following:    Height as of this encounter: 1.676 m (5' 5.98\").    Weight as of this encounter: 99.3 kg (219 lb).    Medication Reconciliation: complete    Neck circumference: 19.68 inches / 50 centimeters.    DME: [facility] Brooks Lovelace CMA on 10/9/2024 at 3:16 PM       "

## 2024-10-12 SDOH — HEALTH STABILITY: PHYSICAL HEALTH: ON AVERAGE, HOW MANY DAYS PER WEEK DO YOU ENGAGE IN MODERATE TO STRENUOUS EXERCISE (LIKE A BRISK WALK)?: 3 DAYS

## 2024-10-12 SDOH — HEALTH STABILITY: PHYSICAL HEALTH: ON AVERAGE, HOW MANY MINUTES DO YOU ENGAGE IN EXERCISE AT THIS LEVEL?: 10 MIN

## 2024-10-12 ASSESSMENT — SOCIAL DETERMINANTS OF HEALTH (SDOH): HOW OFTEN DO YOU GET TOGETHER WITH FRIENDS OR RELATIVES?: TWICE A WEEK

## 2024-10-17 ENCOUNTER — VIRTUAL VISIT (OUTPATIENT)
Dept: FAMILY MEDICINE | Facility: CLINIC | Age: 54
End: 2024-10-17
Payer: COMMERCIAL

## 2024-10-17 DIAGNOSIS — G89.29 CHRONIC BILATERAL LOW BACK PAIN WITHOUT SCIATICA: ICD-10-CM

## 2024-10-17 DIAGNOSIS — M54.50 CHRONIC BILATERAL LOW BACK PAIN WITHOUT SCIATICA: ICD-10-CM

## 2024-10-17 DIAGNOSIS — M47.26 OSTEOARTHRITIS OF SPINE WITH RADICULOPATHY, LUMBAR REGION: Primary | ICD-10-CM

## 2024-10-17 DIAGNOSIS — M47.816 LUMBAR FACET ARTHROPATHY: ICD-10-CM

## 2024-10-17 PROCEDURE — 99442 PR PHYSICIAN TELEPHONE EVALUATION 11-20 MIN: CPT | Mod: 93 | Performed by: NURSE PRACTITIONER

## 2024-10-17 RX ORDER — GABAPENTIN 300 MG/1
600 CAPSULE ORAL 2 TIMES DAILY
Qty: 360 CAPSULE | Refills: 1 | Status: SHIPPED | OUTPATIENT
Start: 2024-10-17

## 2024-10-17 NOTE — PROGRESS NOTES
Robson is a 54 year old who is being evaluated via a billable telephone visit.    What phone number would you like to be contacted at? [phone]   How would you like to obtain your AVS? Pasha  Originating Location (pt. 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  Still having some nerve pain, taking gabapentin twice a day.  Discussed increasing gabapentin dose to 600 mg twice a day and new referral placed to see spine for discussion on repeat ablation or other options.  - gabapentin (NEURONTIN) 300 MG capsule; Take 2 capsules (600 mg) by mouth 2 times daily.  - Spine  Referral; Future      See Patient Instructions    Subjective   Robson is a 54 year old, presenting for the following health issues:  Referral      10/17/2024     7:49 AM   Additional Questions   Roomed by Shruthi RANGEL CMA   Accompanied by Self     HPI     Chronic/Recurring Back Pain Follow Up    Where is your back pain located? (Select all that apply) low back bilateral-right side usually worse   How would you describe your back pain?  sharp and shooting  Where does your back pain spread? the right and left buttock and the right and left  thigh  Since your last clinic visit for back pain, how has your pain changed? gradually worsening  Does your back pain interfere with your job? YES  Since your last visit, have you tried any new treatment? No    *Patient would like a referral for another ablation   Needs a refill of his gabapentin     Only takes twice a day, unsure if it is as effective as it could be  Does make him feel groggy rate after taking  Had last ablation  in 2021, was some help  Recently saw neurosurgery and was very helpful with information and talvor cyst          Review of Systems  Constitutional, HEENT, cardiovascular, pulmonary, gi and gu systems are negative, except as otherwise noted.      Objective    Vitals - Patient Reported  Systolic (Patient Reported): 128  Diastolic (Patient Reported): 81        Physical Exam   General: Alert and no distress //Respiratory: No audible wheeze, cough, or shortness of breath // Psychiatric:  Appropriate affect, tone, and pace of words      Diagnostic Test Results:  Labs reviewed in Epic  none        Phone call duration: 15 minutes  Signed Electronically by: Adelaida Allen DNP,, ANDREW CNP      Chart documentation with Dragon Voice recognition Software. Although reviewed after completion, some words and grammatical errors may remain.

## 2024-10-18 ENCOUNTER — PATIENT OUTREACH (OUTPATIENT)
Dept: CARE COORDINATION | Facility: CLINIC | Age: 54
End: 2024-10-18
Payer: COMMERCIAL

## 2024-10-21 ENCOUNTER — TELEPHONE (OUTPATIENT)
Dept: NEUROSURGERY | Facility: CLINIC | Age: 54
End: 2024-10-21
Payer: COMMERCIAL

## 2024-10-21 ENCOUNTER — PATIENT OUTREACH (OUTPATIENT)
Dept: CARE COORDINATION | Facility: CLINIC | Age: 54
End: 2024-10-21
Payer: COMMERCIAL

## 2024-10-21 NOTE — TELEPHONE ENCOUNTER
Left Voicemail (1st Attempt) for the patient to call back and schedule the following:    Appointment type: MRI  Provider: Brigid Anne  Return date: 1 wk prior to appointment w/ Brigid Anne  Specialty phone number: [phone]  Additional appointment(s) needed: MRI prior  Additonal Notes: N/A

## 2024-10-25 ENCOUNTER — TELEPHONE (OUTPATIENT)
Dept: NEUROSURGERY | Facility: CLINIC | Age: 54
End: 2024-10-25
Payer: COMMERCIAL

## 2024-10-25 NOTE — TELEPHONE ENCOUNTER
Called to remind them to schedule the MRI at [facility] prior to appointment w/ Brigid Anne via task. -KB

## 2024-10-29 DIAGNOSIS — A60.00 GENITAL HERPES SIMPLEX, UNSPECIFIED SITE: ICD-10-CM

## 2024-10-29 RX ORDER — VALACYCLOVIR HYDROCHLORIDE 500 MG/1
500 TABLET, FILM COATED ORAL DAILY
Qty: 90 TABLET | Refills: 1 | Status: SHIPPED | OUTPATIENT
Start: 2024-10-29

## 2025-01-16 ENCOUNTER — APPOINTMENT (OUTPATIENT)
Dept: GENERAL RADIOLOGY | Facility: CLINIC | Age: 55
End: 2025-01-16
Attending: EMERGENCY MEDICINE
Payer: COMMERCIAL

## 2025-01-16 ENCOUNTER — HOSPITAL ENCOUNTER (EMERGENCY)
Facility: CLINIC | Age: 55
Discharge: HOME | End: 2025-01-16
Attending: EMERGENCY MEDICINE | Admitting: EMERGENCY MEDICINE
Payer: COMMERCIAL

## 2025-01-16 ENCOUNTER — APPOINTMENT (OUTPATIENT)
Dept: CT IMAGING | Facility: CLINIC | Age: 55
End: 2025-01-16
Attending: EMERGENCY MEDICINE
Payer: COMMERCIAL

## 2025-01-16 VITALS
WEIGHT: 219 LBS | RESPIRATION RATE: 18 BRPM | TEMPERATURE: 99 F | HEART RATE: 70 BPM | OXYGEN SATURATION: 97 % | BODY MASS INDEX: 34.37 KG/M2 | HEIGHT: 67 IN | SYSTOLIC BLOOD PRESSURE: 134 MMHG | DIASTOLIC BLOOD PRESSURE: 86 MMHG

## 2025-01-16 DIAGNOSIS — M25.571 ACUTE RIGHT ANKLE PAIN: ICD-10-CM
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
## 2025-01-16 PROCEDURE — 73630 X-RAY EXAM OF FOOT: CPT | Mod: 50

## 2025-01-16 PROCEDURE — 73590 X-RAY EXAM OF LOWER LEG: CPT | Mod: 50

## 2025-01-16 PROCEDURE — 73522 X-RAY EXAM HIPS BI 3-4 VIEWS: CPT

## 2025-01-16 PROCEDURE — 99284 EMERGENCY DEPT VISIT MOD MDM: CPT | Mod: 25 | Performed by: EMERGENCY MEDICINE

## 2025-01-16 PROCEDURE — 99283 EMERGENCY DEPT VISIT LOW MDM: CPT | Performed by: EMERGENCY MEDICINE

## 2025-01-16 PROCEDURE — 72131 CT LUMBAR SPINE W/O DYE: CPT

## 2025-01-16 PROCEDURE — 250N000013 HC RX MED GY IP 250 OP 250 PS 637: Performed by: EMERGENCY MEDICINE

## 2025-01-16 PROCEDURE — 73562 X-RAY EXAM OF KNEE 3: CPT | Mod: 50

## 2025-01-16 RX ORDER — ACETAMINOPHEN 500 MG
1000 TABLET ORAL ONCE
Status: COMPLETED | OUTPATIENT
Start: 2025-01-16 | End: 2025-01-16

## 2025-01-16 RX ADMIN — ACETAMINOPHEN 1000 MG: 500 TABLET ORAL at 19:39

## 2025-01-16 ASSESSMENT — COLUMBIA-SUICIDE SEVERITY RATING SCALE - C-SSRS
1. IN THE PAST MONTH, HAVE YOU WISHED YOU WERE DEAD OR WISHED YOU COULD GO TO SLEEP AND NOT WAKE UP?: NO
2. HAVE YOU ACTUALLY HAD ANY THOUGHTS OF KILLING YOURSELF IN THE PAST MONTH?: NO
6. HAVE YOU EVER DONE ANYTHING, STARTED TO DO ANYTHING, OR PREPARED TO DO ANYTHING TO END YOUR LIFE?: NO

## 2025-01-16 ASSESSMENT — ACTIVITIES OF DAILY LIVING (ADL)
ADLS_ACUITY_SCORE: 41

## 2025-01-17 NOTE — ED PROVIDER NOTES
"[facility]  Emergency Department Visit Note    PATIENT:  Robson Galeano     55 year old     male      [id number]    Chief complaint:  Chief Complaint   Patient presents with    Fall     Fell about 8 ft off ladder onto feet-felt immediate pain to bilateral feet, knees, hips.         History of present illness:  Patient is a 55 year old male with a medical history significant for rheumatoid arthritis currently on methotrexate, take, amitriptyline, folic acid presenting for evaluation of right-sided ankle pain after he fell.  Patient was on a ladder roughly 8 feet above the ground when the ladder slipped from underneath him and he landed on the ground on his feet.  No loss of consciousness.  He did fall over on his side after that and has severe pain in his right ankle otherwise his knees are little sore and his hips are little sore.  Did not hit his head.  He has no back or neck pain at this time.  He reports that he remembers the entire accident and he landed on his feet because he kept a  on the ladder all the way down.  It was on a concrete surface.  No seizure activity after the injury.  He denies any open wounds.  Was able to get up and take couple of steps after he fell but then after that he was having severe pain specifically with walking on his right foot.  He is able to walk on his toes on his right foot but has a hard time when he is putting weight on his right foot specifically on the heel.    Review of Systems:  As in HPI above    /74   Pulse 74   Temp 99  F (37.2  C) (Oral)   Resp 18   Ht 1.702 m (5' 7\")   Wt 99.3 kg (219 lb)   SpO2 97%   BMI 34.30 kg/m      Physical Exam  Constitutional: laying in hospital bed, alert, oriented, in no apparent distress, conversant, and answering questions appropriately  HEENT: normocephalic, atraumatic, pupils 3mm, equal, round, and reactive to light, sclerae anicteric, and extraocular motions intact  Neck: able to fully range and no " midline tenderness  Cardiovascular: regular rate and rhythm  Pulmonary: breathing comfortably on room air and lungs clear to auscultation bilaterally  Abdominal: soft, non-tender, non-distended  Genitourinary: deferred  Extremities/MSK: no peripheral edema, no cyanosis , right lower extremity with tenderness over lateral malleolus just inferior to ankle joint, extremities otherwise ranged and palpated without deformity, and no calf tenderness to palpation mild lumbar tenderness with palpation otherwise no spinal tenderness with palpation  Skin: warm, dry and non-diaphoretic  Neurologic: moves all four extremities spontaneously, GCS 15, 5/5  strength bilaterally, 5/5 strength in dorsiflexion and plantarflexion bilaterally, and sensation intact to light touch in bilateral upper and lower extremities unable to walk  Psychiatric: calm, appropriate      MDM:  Patient is a 55 year old male with above history presenting for evaluation of right ankle pain after he fell off of a ladder today..    Vitals reassuring and within normal limits. Exam right ankle tenderness to palpation otherwise reassuring exam.    Patient fell roughly 8 feet and did not hit his head I suspect that majority of the weight went into his heels.  I am concerned about a calcaneus fracture, ankle or knee or hip fracture.  Also considering he landed on his feet I am concerned about a lumbar fracture.  He does have some midline tenderness over his lumbar region however he states this is chronic.  I am concerned about a distracting injury in his foot.  Pinpoint tenderness just below the lateral malleolus.  Ultimately I plan move forward with x-rays of his entire lower extremity bilaterally as well as a CT scan of his lower back.  Patient is in agreement with this plan.  Does not want any pain medications.  Tylenol ordered.  Considering no loss of consciousness, did not hit his head, ranging from ordering a head CT.    Remainder of ED course  below.    ED COURSE:  ED Course as of 01/16/25 2141   Thu Jan 16, 2025 2056 CT Lumbar Spine w/o Contrast  IMPRESSION:  1.  No evidence of acute fracture or subluxation of the lumbar spine by CT imaging.  2.  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     2056 XR Knee Bilateral 3 Views  IMPRESSION:   RIGHT KNEE: Normal joint spaces and alignment. No fracture or joint effusion.     LEFT KNEE: Normal joint spaces and alignment. No fracture or joint effusion.     2056 XR Pelvis and Hip Bilateral 2 Views  IMPRESSION: Normal joint spaces and alignment. No fracture.   2057 XR Foot Bilateral G/E 3 Views  IMPRESSION: Normal joint spaces and alignment. No erosive change. No fracture.   2057 XR Tibia and Fibula Bilateral 2 Views  IMPRESSION: The left and right tibia and fibula are normal. No fracture. Normal alignment.   2138 Discussed the findings of workup with patient.  He still has a area of pinpoint tenderness over his right lateral malleolus just inferior to the ankle joint.  I suspect that there is maybe some sort of fracture here however there is no dislocation therefore planning to discharge the patient with a cam boot, nonweightbearing restrictions and crutches.  Also will provide the patient with an orthopedic referral.  Patient is aware of the plan.  All questions answered and discharged in improved edition       Encounter Diagnoses:  Final diagnoses:   Fall, initial encounter   Acute right ankle pain       Final disposition: discharge    Miranda Bella DO  EM Physician  [facility] ED       Miranda Bella DO  01/16/25 2145

## 2025-01-17 NOTE — ED TRIAGE NOTES
Fell about 8 ft off ladder onto feet-felt immediate pain to bilateral feet, knees, hips. States ladder slipped out from under him and landed on concrete/hard surface. Pt was able to stand and take a few steps from wheelchair to stretcher.      Triage Assessment (Adult)       Row Name 01/16/25 1358          Triage Assessment    Airway WDL WDL        Respiratory WDL    Respiratory WDL WDL        Cardiac WDL    Cardiac WDL WDL        Peripheral/Neurovascular WDL    Peripheral Neurovascular WDL WDL        Cognitive/Neuro/Behavioral WDL    Cognitive/Neuro/Behavioral WDL WDL

## 2025-01-17 NOTE — DISCHARGE INSTRUCTIONS
You were seen in the ER today with a chief complaint of ankle pain after a fall.  As we discussed all of your workup was reassuring however you still may have an underlying fracture in your right ankle.  Please wear the boot then I am prescribing you with do not put weight on your ankle and follow-up with an orthopedic doctor within a week to 10 days.  Use Tylenol 4 times daily every day to reduce inflammation in your ankle.  You can elevate it and put ice on it whenever helps to feel more comfortable.  Please come back if there is a new area of redness, deformity, severe sudden pain in the ankle otherwise you can follow-up with orthopedics like we discussed.    Please wear the boot and do not bear weight as this can sometimes change the nature of your ankle if it is unstable.    As always come back with changing or worsening symptoms that you find concerning

## 2025-01-20 ENCOUNTER — OFFICE VISIT (OUTPATIENT)
Dept: ORTHOPEDICS | Facility: CLINIC | Age: 55
End: 2025-01-20
Attending: EMERGENCY MEDICINE
Payer: COMMERCIAL

## 2025-01-20 DIAGNOSIS — M25.571 ACUTE RIGHT ANKLE PAIN: ICD-10-CM

## 2025-01-20 DIAGNOSIS — S90.32XA CONTUSION OF FOOT OR HEEL, LEFT, INITIAL ENCOUNTER: Primary | ICD-10-CM

## 2025-01-20 DIAGNOSIS — S90.31XA CONTUSION OF FOOT OR HEEL, RIGHT, INITIAL ENCOUNTER: ICD-10-CM

## 2025-01-20 PROCEDURE — 99204 OFFICE O/P NEW MOD 45 MIN: CPT | Performed by: FAMILY MEDICINE

## 2025-01-20 PROCEDURE — G2211 COMPLEX E/M VISIT ADD ON: HCPCS | Performed by: FAMILY MEDICINE

## 2025-01-20 NOTE — PATIENT INSTRUCTIONS
1. Contusion of foot or heel, left, initial encounter    2. Acute right ankle pain    3. Contusion of foot or heel, right, initial encounter      -Patient has bilateral heel pain after falling down a ladder and landing on his feet due to calcaneal contusion  -X-rays performed of bilateral feet show no acute fracture or dislocation bilaterally.  -Patient has tenderness to palpation of bilateral calcaneus with a positive calcaneal squeeze  -Patient reports right heel is more painful than the left.  -Patient will continue with the fracture boot as needed.  Patient may purchase over-the-counter heel cups for additional padding  -Patient will weight-bear as tolerated but will try to sit and rest is much as possible while at work  -Patient will continue to follow-up with me for further treatment recommendations.  To consider an MRI at that time for further evaluation.  Patient is also complaining of bilateral knee pain.  To consider workup and potential cortisone injection if indicated  -Call direct clinic number [[phone]] at any time with questions or concerns.    Albert Yeo MD [facility]  [facility] [place]

## 2025-01-20 NOTE — LETTER
1/20/2025      Robson Galeano  [address] [address] [address]      Dear Colleague,    Thank you for referring your patient, Robson Galeano, to the [facility] [place]. Please see a copy of my visit note below.    ASSESSMENT & PLAN  Patient Instructions     1. Contusion of foot or heel, left, initial encounter    2. Acute right ankle pain    3. Contusion of foot or heel, right, initial encounter      -Patient has bilateral heel pain after falling down a ladder and landing on his feet due to calcaneal contusion  -X-rays performed of bilateral feet show no acute fracture or dislocation bilaterally.  -Patient has tenderness to palpation of bilateral calcaneus with a positive calcaneal squeeze  -Patient reports right heel is more painful than the left.  -Patient will continue with the fracture boot as needed.  Patient may purchase over-the-counter heel cups for additional padding  -Patient will weight-bear as tolerated but will try to sit and rest is much as possible while at work  -Patient will continue to follow-up with me for further treatment recommendations.  To consider an MRI at that time for further evaluation.  Patient is also complaining of bilateral knee pain.  To consider workup and potential cortisone injection if indicated  -Call direct clinic number [[phone]] at any time with questions or concerns.    Albert Yeo MD [facility]  [facility]          -----    SUBJECTIVE  Robson Galeano is a/an 55 year old male who is seen in consultation at the request of  Miranda Bella D.O. for evaluation of bilateral foot pain. The patient is seen with their wife.    Onset: 4 day(s) ago. Patient describes injury as falling down a ladder onto their feet.   Location of Pain: bilateral plantar aspect of the midfoot and calcaneous.   Rating of Pain at worst: 10/10  Rating of Pain Currently: 2/10  Worsened by: Weight bearing    Better with: Rest  Treatments tried: rest/activity avoidance, ice, Tylenol, and casting/splinting/bracing  Associated symptoms: swelling and bruising  Orthopedic history: NO  Relevant surgical history: NO  Social history: social history: Self Employed    Past Medical History:   Diagnosis Date     Arthritis      Social History     Socioeconomic History     Marital status:    Tobacco Use     Smoking status: Former     Current packs/day: 0.00     Types: Cigarettes     Quit date: 2002     Years since quittin.0     Passive exposure: Past     Smokeless tobacco: Never   Vaping Use     Vaping status: Never Used   Substance and Sexual Activity     Alcohol use: Yes     Drug use: Yes     Types: Marijuana     Comment: has marijuana card     Sexual activity: Yes     Partners: Female     Birth control/protection: None   Other Topics Concern     Parent/sibling w/ CABG, MI or angioplasty before 65F 55M? No     Social Drivers of Health     Financial Resource Strain: Low Risk  (10/12/2024)    Financial Resource Strain      Within the past 12 months, have you or your family members you live with been unable to get utilities (heat, electricity) when it was really needed?: No   Food Insecurity: Low Risk  (10/12/2024)    Food Insecurity      Within the past 12 months, did you worry that your food would run out before you got money to buy more?: No      Within the past 12 months, did the food you bought just not last and you didn t have money to get more?: No   Transportation Needs: Low Risk  (10/12/2024)    Transportation Needs      Within the past 12 months, has lack of transportation kept you from medical appointments, getting your medicines, non-medical meetings or appointments, work, or from getting things that you need?: No   Physical Activity: Insufficiently Active (10/12/2024)    Exercise Vital Sign      Days of Exercise per Week: 3 days      Minutes of Exercise per Session: 10 min   Stress: No Stress Concern  Present (10/12/2024)    [facility] of Occupational Health - Occupational Stress Questionnaire      Feeling of Stress : Only a little   Social Connections: Unknown (10/12/2024)    Social Connection and Isolation Panel [NHANES]      Frequency of Social Gatherings with Friends and Family: Twice a week   Housing Stability: Low Risk  (10/12/2024)    Housing Stability      Do you have housing? : Yes      Are you worried about losing your housing?: No          Patient's past medical, surgical, social, and family histories were reviewed today and no changes are noted.    REVIEW OF SYSTEMS:  10 point ROS is negative other than symptoms noted above in HPI, Past Medical History or as stated below  Constitutional: NEGATIVE for fever, chills, change in weight  Skin: NEGATIVE for worrisome rashes, moles or lesions  GI/: NEGATIVE for bowel or bladder changes  Neuro: NEGATIVE for weakness, dizziness or paresthesias    OBJECTIVE:  There were no vitals taken for this visit.   General: healthy, alert and in no distress  HEENT: no scleral icterus or conjunctival erythema  Skin: no suspicious lesions or rash. No jaundice.  CV:  no pedal edema  Resp: normal respiratory effort without conversational dyspnea   Psych: normal mood and affect  Gait: normal steady gait with appropriate coordination and balance  Neuro: Normal light sensory exam of lower extremity  MSK:  BILATERAL ANKLE  Inspection:    No swelling or ecchymosis is observed  Palpation:    Tender about the calcaneus. Remainder of bony and ligamentous landmarks are nontender.  Range of Motion:     Plantarflexion within normal limits / dorsiflexion within normal limits / inversion within normal limits / eversion within normal limits  Strength:    within normal limits  Special Tests:    negative anterior drawer, negative talar tilt, negative valgus stress, negative forced external rotation/eversion, negative Shaw sign, positive squeeze test.         Independent  visualization of the below image:  No results found for this or any previous visit (from the past 24 hours).      Personal review of bilateral foot x-ray performed on 1/16/2025 shows no acute fracture, dislocation or significant degenerative osseous abnormalities        Albert Yeo MD [facility]      Again, thank you for allowing me to participate in the care of your patient.        Sincerely,        Albert Yeo, MD    Electronically signed

## 2025-01-20 NOTE — PROGRESS NOTES
ASSESSMENT & PLAN  Patient Instructions     1. Contusion of foot or heel, left, initial encounter    2. Acute right ankle pain    3. Contusion of foot or heel, right, initial encounter      -Patient has bilateral heel pain after falling down a ladder and landing on his feet due to calcaneal contusion  -X-rays performed of bilateral feet show no acute fracture or dislocation bilaterally.  -Patient has tenderness to palpation of bilateral calcaneus with a positive calcaneal squeeze  -Patient reports right heel is more painful than the left.  -Patient will continue with the fracture boot as needed.  Patient may purchase over-the-counter heel cups for additional padding  -Patient will weight-bear as tolerated but will try to sit and rest is much as possible while at work  -Patient will continue to follow-up with me for further treatment recommendations.  To consider an MRI at that time for further evaluation.  Patient is also complaining of bilateral knee pain.  To consider workup and potential cortisone injection if indicated  -Call direct clinic number [[phone]] at any time with questions or concerns.    Albert Yeo MD [facility]  [facility]          -----    SUBJECTIVE  Robson Galeano is a/an 55 year old male who is seen in consultation at the request of  Miranda Bella D.O. for evaluation of bilateral foot pain. The patient is seen with their wife.    Onset: 4 day(s) ago. Patient describes injury as falling down a ladder onto their feet.   Location of Pain: bilateral plantar aspect of the midfoot and calcaneous.   Rating of Pain at worst: 10/10  Rating of Pain Currently: 2/10  Worsened by: Weight bearing   Better with: Rest  Treatments tried: rest/activity avoidance, ice, Tylenol, and casting/splinting/bracing  Associated symptoms: swelling and bruising  Orthopedic history: NO  Relevant surgical history: NO  Social history: social history: Self  Halie is a 35 year old who is being evaluated via a billable telephone visit.      What phone number would you like to be contacted at? [phone]  How would you like to obtain your AVS? Emma    Distant Location (provider location):  On-site    Assessment & Plan   Problem List Items Addressed This Visit    None  Visit Diagnoses     Immunity status testing    -  Primary    Relevant Orders    Varicella Zoster Virus Antibody IgG               ISABELA Deal CNP  [facility] [place]    Camden Ambrose is a 35 year old, presenting for the following health issues:  No chief complaint on file.      History of Present Illness       Reason for visit:  Need a varicella titer for school    She eats 4 or more servings of fruits and vegetables daily.She consumes 0 sweetened beverage(s) daily.She exercises with enough effort to increase her heart rate 30 to 60 minutes per day.  She exercises with enough effort to increase her heart rate 5 days per week.   She is taking medications regularly.    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    Physical Exam   healthy, alert and no distress  PSYCH: Alert and oriented times 3; coherent speech, normal   rate and volume, able to articulate logical thoughts, able   to abstract reason, no tangential thoughts, no hallucinations   or delusions  Her affect is normal  RESP: No cough, no audible wheezing, able to talk in full sentences  Remainder of exam unable to be completed due to telephone  Employed    Past Medical History:   Diagnosis Date    Arthritis      Social History     Socioeconomic History    Marital status:    Tobacco Use    Smoking status: Former     Current packs/day: 0.00     Types: Cigarettes     Quit date: 2002     Years since quittin.0     Passive exposure: Past    Smokeless tobacco: Never   Vaping Use    Vaping status: Never Used   Substance and Sexual Activity    Alcohol use: Yes    Drug use: Yes     Types: Marijuana     Comment: has marijuana card    Sexual activity: Yes     Partners: Female     Birth control/protection: None   Other Topics Concern    Parent/sibling w/ CABG, MI or angioplasty before 65F 55M? No     Social Drivers of Health     Financial Resource Strain: Low Risk  (10/12/2024)    Financial Resource Strain     Within the past 12 months, have you or your family members you live with been unable to get utilities (heat, electricity) when it was really needed?: No   Food Insecurity: Low Risk  (10/12/2024)    Food Insecurity     Within the past 12 months, did you worry that your food would run out before you got money to buy more?: No     Within the past 12 months, did the food you bought just not last and you didn t have money to get more?: No   Transportation Needs: Low Risk  (10/12/2024)    Transportation Needs     Within the past 12 months, has lack of transportation kept you from medical appointments, getting your medicines, non-medical meetings or appointments, work, or from getting things that you need?: No   Physical Activity: Insufficiently Active (10/12/2024)    Exercise Vital Sign     Days of Exercise per Week: 3 days     Minutes of Exercise per Session: 10 min   Stress: No Stress Concern Present (10/12/2024)    [place] [employer] - Occupational Stress Questionnaire     Feeling of Stress : Only a little   Social Connections: Unknown (10/12/2024)    Social Connection and Isolation Panel [NHANES]     Frequency of Social  visits                Phone call duration: 4 minutes     Gatherings with Friends and Family: Twice a week   Housing Stability: Low Risk  (10/12/2024)    Housing Stability     Do you have housing? : Yes     Are you worried about losing your housing?: No          Patient's past medical, surgical, social, and family histories were reviewed today and no changes are noted.    REVIEW OF SYSTEMS:  10 point ROS is negative other than symptoms noted above in HPI, Past Medical History or as stated below  Constitutional: NEGATIVE for fever, chills, change in weight  Skin: NEGATIVE for worrisome rashes, moles or lesions  GI/: NEGATIVE for bowel or bladder changes  Neuro: NEGATIVE for weakness, dizziness or paresthesias    OBJECTIVE:  There were no vitals taken for this visit.   General: healthy, alert and in no distress  HEENT: no scleral icterus or conjunctival erythema  Skin: no suspicious lesions or rash. No jaundice.  CV:  no pedal edema  Resp: normal respiratory effort without conversational dyspnea   Psych: normal mood and affect  Gait: normal steady gait with appropriate coordination and balance  Neuro: Normal light sensory exam of lower extremity  MSK:  BILATERAL ANKLE  Inspection:    No swelling or ecchymosis is observed  Palpation:    Tender about the calcaneus. Remainder of bony and ligamentous landmarks are nontender.  Range of Motion:     Plantarflexion within normal limits / dorsiflexion within normal limits / inversion within normal limits / eversion within normal limits  Strength:    within normal limits  Special Tests:    negative anterior drawer, negative talar tilt, negative valgus stress, negative forced external rotation/eversion, negative Shaw sign, positive squeeze test.         Independent visualization of the below image:  No results found for this or any previous visit (from the past 24 hours).      Personal review of bilateral foot x-ray performed on 1/16/2025 shows no acute fracture, dislocation or significant degenerative osseous  abnormalities        Albert Yeo MD [facility]

## 2025-03-24 DIAGNOSIS — M54.50 CHRONIC BILATERAL LOW BACK PAIN WITHOUT SCIATICA: ICD-10-CM

## 2025-03-24 DIAGNOSIS — G89.29 CHRONIC BILATERAL LOW BACK PAIN WITHOUT SCIATICA: ICD-10-CM

## 2025-03-24 DIAGNOSIS — G47.01 INSOMNIA DUE TO MEDICAL CONDITION: ICD-10-CM

## 2025-03-25 RX ORDER — AMITRIPTYLINE HYDROCHLORIDE 10 MG/1
10 TABLET ORAL AT BEDTIME
Qty: 90 TABLET | Refills: 1 | Status: SHIPPED | OUTPATIENT
Start: 2025-03-25

## 2025-03-31 ENCOUNTER — HOSPITAL ENCOUNTER (EMERGENCY)
Facility: CLINIC | Age: 55
Discharge: HOME | End: 2025-03-31
Attending: FAMILY MEDICINE | Admitting: FAMILY MEDICINE
Payer: COMMERCIAL

## 2025-03-31 ENCOUNTER — APPOINTMENT (OUTPATIENT)
Dept: CT IMAGING | Facility: CLINIC | Age: 55
End: 2025-03-31
Attending: FAMILY MEDICINE
Payer: COMMERCIAL

## 2025-03-31 VITALS
HEIGHT: 67 IN | SYSTOLIC BLOOD PRESSURE: 149 MMHG | BODY MASS INDEX: 34.53 KG/M2 | HEART RATE: 73 BPM | WEIGHT: 220 LBS | DIASTOLIC BLOOD PRESSURE: 94 MMHG | TEMPERATURE: 98.4 F | RESPIRATION RATE: 16 BRPM | OXYGEN SATURATION: 96 %

## 2025-03-31 DIAGNOSIS — K57.92 DIVERTICULITIS: ICD-10-CM

## 2025-03-31 DIAGNOSIS — R93.5 ABNORMAL CT OF THE ABDOMEN: ICD-10-CM

## 2025-03-31 DIAGNOSIS — K76.0 HEPATIC STEATOSIS: ICD-10-CM

## 2025-03-31 LAB
ANION GAP SERPL CALCULATED.3IONS-SCNC: 11 MMOL/L (ref 7–15)
BASOPHILS # BLD AUTO: 0 10E3/UL (ref 0–0.2)
BASOPHILS NFR BLD AUTO: 1 %
BUN SERPL-MCNC: 14.2 MG/DL (ref 6–20)
CALCIUM SERPL-MCNC: 9.2 MG/DL (ref 8.8–10.4)
CHLORIDE SERPL-SCNC: 106 MMOL/L (ref 98–107)
CREAT SERPL-MCNC: 0.98 MG/DL (ref 0.67–1.17)
CRP SERPL-MCNC: <3 MG/L
EGFRCR SERPLBLD CKD-EPI 2021: 90 ML/MIN/1.73M2
EOSINOPHIL # BLD AUTO: 0.2 10E3/UL (ref 0–0.7)
EOSINOPHIL NFR BLD AUTO: 3 %
ERYTHROCYTE [DISTWIDTH] IN BLOOD BY AUTOMATED COUNT: 13.3 % (ref 10–15)
GLUCOSE SERPL-MCNC: 168 MG/DL (ref 70–99)
HCO3 SERPL-SCNC: 24 MMOL/L (ref 22–29)
HCT VFR BLD AUTO: 38.9 % (ref 40–53)
HGB BLD-MCNC: 13.6 G/DL (ref 13.3–17.7)
IMM GRANULOCYTES # BLD: 0 10E3/UL
IMM GRANULOCYTES NFR BLD: 0 %
LYMPHOCYTES # BLD AUTO: 1.2 10E3/UL (ref 0.8–5.3)
LYMPHOCYTES NFR BLD AUTO: 19 %
MCH RBC QN AUTO: 31.6 PG (ref 26.5–33)
MCHC RBC AUTO-ENTMCNC: 35 G/DL (ref 31.5–36.5)
MCV RBC AUTO: 91 FL (ref 78–100)
MONOCYTES # BLD AUTO: 0.4 10E3/UL (ref 0–1.3)
MONOCYTES NFR BLD AUTO: 7 %
NEUTROPHILS # BLD AUTO: 4.5 10E3/UL (ref 1.6–8.3)
NEUTROPHILS NFR BLD AUTO: 70 %
NRBC # BLD AUTO: 0 10E3/UL
NRBC BLD AUTO-RTO: 0 /100
PLATELET # BLD AUTO: 168 10E3/UL (ref 150–450)
POTASSIUM SERPL-SCNC: 3.7 MMOL/L (ref 3.4–5.3)
RBC # BLD AUTO: 4.3 10E6/UL (ref 4.4–5.9)
SODIUM SERPL-SCNC: 141 MMOL/L (ref 135–145)
WBC # BLD AUTO: 6.3 10E3/UL (ref 4–11)

## 2025-03-31 PROCEDURE — 85004 AUTOMATED DIFF WBC COUNT: CPT | Performed by: FAMILY MEDICINE

## 2025-03-31 PROCEDURE — 74177 CT ABD & PELVIS W/CONTRAST: CPT

## 2025-03-31 PROCEDURE — 99285 EMERGENCY DEPT VISIT HI MDM: CPT | Mod: 25 | Performed by: FAMILY MEDICINE

## 2025-03-31 PROCEDURE — 80048 BASIC METABOLIC PNL TOTAL CA: CPT | Performed by: FAMILY MEDICINE

## 2025-03-31 PROCEDURE — 86140 C-REACTIVE PROTEIN: CPT | Performed by: FAMILY MEDICINE

## 2025-03-31 PROCEDURE — 36415 COLL VENOUS BLD VENIPUNCTURE: CPT | Performed by: FAMILY MEDICINE

## 2025-03-31 PROCEDURE — 99284 EMERGENCY DEPT VISIT MOD MDM: CPT | Performed by: FAMILY MEDICINE

## 2025-03-31 PROCEDURE — 82435 ASSAY OF BLOOD CHLORIDE: CPT | Performed by: FAMILY MEDICINE

## 2025-03-31 PROCEDURE — 85014 HEMATOCRIT: CPT | Performed by: FAMILY MEDICINE

## 2025-03-31 PROCEDURE — 250N000009 HC RX 250: Performed by: FAMILY MEDICINE

## 2025-03-31 PROCEDURE — 250N000011 HC RX IP 250 OP 636: Performed by: FAMILY MEDICINE

## 2025-03-31 RX ORDER — IOPAMIDOL 755 MG/ML
107 INJECTION, SOLUTION INTRAVASCULAR ONCE
Status: COMPLETED | OUTPATIENT
Start: 2025-03-31 | End: 2025-03-31

## 2025-03-31 RX ADMIN — SODIUM CHLORIDE 67 ML: 9 INJECTION, SOLUTION INTRAVENOUS at 13:06

## 2025-03-31 RX ADMIN — IOPAMIDOL 107 ML: 755 INJECTION, SOLUTION INTRAVENOUS at 13:06

## 2025-03-31 ASSESSMENT — ACTIVITIES OF DAILY LIVING (ADL)
ADLS_ACUITY_SCORE: 41

## 2025-03-31 ASSESSMENT — COLUMBIA-SUICIDE SEVERITY RATING SCALE - C-SSRS
2. HAVE YOU ACTUALLY HAD ANY THOUGHTS OF KILLING YOURSELF IN THE PAST MONTH?: NO
1. IN THE PAST MONTH, HAVE YOU WISHED YOU WERE DEAD OR WISHED YOU COULD GO TO SLEEP AND NOT WAKE UP?: NO
6. HAVE YOU EVER DONE ANYTHING, STARTED TO DO ANYTHING, OR PREPARED TO DO ANYTHING TO END YOUR LIFE?: NO

## 2025-03-31 NOTE — ED TRIAGE NOTES
Pt presents with LLQ abdominal pain that started yesterday. Hx of diverticulitis and a this feels similar. Denied N/V/D.      Triage Assessment (Adult)       Row Name 03/31/25 1158          Triage Assessment    Airway WDL WDL        Respiratory WDL    Respiratory WDL WDL        Skin Circulation/Temperature WDL    Skin Circulation/Temperature WDL WDL        Cardiac WDL    Cardiac WDL WDL        Peripheral/Neurovascular WDL    Peripheral Neurovascular WDL WDL        Cognitive/Neuro/Behavioral WDL    Cognitive/Neuro/Behavioral WDL WDL

## 2025-03-31 NOTE — DISCHARGE INSTRUCTIONS
RETURN TO THE EMERGENCY ROOM FOR THE FOLLOWING:    Severely worsened pain, fever greater than 101, repeated vomiting, or at anytime for any concern.    FOLLOW UP:    In relation to your diverticulitis, with your primary clinic only as needed or back to the ED if not improved over the next 5 days.    Follow-up with your primary clinic for further discussion regarding your CT findings, specifically the stomach abnormality and hepatic steatosis.    TREATMENT RECOMMENDATIONS:    Ibuprofen 600 mg every six hours for pain (7 days duration).  Tylenol 1000 mg every six hours for pain (7 days duration).  Therefore, you can alternate these every three hours and do it safely.  ONLY take these medications if it is safe to do so given your medical history.    Augmentin twice daily for 7 days.    NURSE ADVICE LINE:  [phone] or [phone]

## 2025-03-31 NOTE — ED PROVIDER NOTES
HPI   Patient is a 55-year-old male presenting with left lower quadrant abdominal pain.  Per my chart review, the patient has a known history of diverticulitis, the last being about 18 months ago.  He has had multiple episodes of diverticulitis but no surgery.  He is immunosuppressed with methotrexate and hydroxychloroquine for rheumatoid arthritis.  He has a history of inguinal hernia and obesity.  He has hypertension.  No recent medication changes reported.    The patient has had left lower quadrant pain since yesterday.  Pain is constant and worsening.  No fever.  No vomiting.  No diarrhea or constipation.  No dysuria, urgency, or frequency of urination.  This feels similar to prior episodes of diverticulitis.      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     Priority: Medium    Genital herpes simplex, unspecified site 10/24/2023     Priority: Medium    Chronic bilateral low back pain without sciatica 03/02/2023     Priority: Medium    Benign prostatic hyperplasia, unspecified whether lower urinary tract symptoms present 03/02/2023     Priority: Medium    Hypogonadism male 07/30/2021     Priority: Medium    Non-recurrent unilateral inguinal hernia without obstruction or gangrene 01/26/2021     Priority: Medium    Seropositive rheumatoid arthritis (H) 12/28/2019     Priority: Medium    Low testosterone in male 09/29/2019     Priority: Medium    Ocular migraine 08/31/2012     Priority:  "Medium     Formatting of this note might be different from the original.  Rare-last 10-11      Morbid obesity (H) 2012     Priority: Medium     Formatting of this note might be different from the original.        Past Medical History:    Past Medical History:   Diagnosis Date    Arthritis      Past Surgical History:    Past Surgical History:   Procedure Laterality Date    COLONOSCOPY N/A 2024    Procedure: COLONOSCOPY, FLEXIBLE, WITH LESION REMOVAL USING SNARE;  Surgeon: Mason Herrera MD;  Location: [facility]    IR LUMBAR PUNCTURE  2021    IR LUMBAR PUNCTURE  2021    IR LUMBAR PUNCTURE  2022     Family History:    Family History   Problem Relation Age of Onset    Breast Cancer Mother     Colon Cancer Mother     Diabetes Father     Cerebrovascular Disease Father     Colon Cancer Maternal Grandfather      Social History:  Marital Status:   [2]  Social History     Tobacco Use    Smoking status: Former     Current packs/day: 0.00     Types: Cigarettes     Quit date: 2002     Years since quittin.2     Passive exposure: Past    Smokeless tobacco: Never   Vaping Use    Vaping status: Never Used   Substance Use Topics    Alcohol use: Yes    Drug use: Yes     Types: Marijuana     Comment: has marijuana card      Medications:    amitriptyline (ELAVIL) 10 MG tablet  amoxicillin-clavulanate (AUGMENTIN) 875-125 MG tablet  B-D TB SYRINGE 27G X 1/2\" 1 ML MISC  Filter Needles (BD FILTER NEEDLE) 18G X 1-1/2\" MISC  folic acid (FOLVITE) 1 MG tablet  gabapentin (NEURONTIN) 300 MG capsule  methotrexate sodium, PF, 50 MG/2ML SOLN injection  Probiotic Product (FORTIFY DAILY PROBIOTIC PO)  Syringe/Needle, Disp, 23G X 1-1/2\" 3 ML MISC  tamsulosin (FLOMAX) 0.4 MG capsule  testosterone cypionate (DEPOTESTOSTERONE) 100 MG/ML injection  valACYclovir (VALTREX) 1000 mg tablet  valACYclovir (VALTREX) 500 MG tablet      Review of Systems   All other systems reviewed and are negative.      PE   BP: " "(!) 149/94  Pulse: 73  Temp: 98.4  F (36.9  C)  Resp: 16  Height: 170.2 cm (5' 7\")  Weight: 99.8 kg (220 lb)  SpO2: 96 %  Physical Exam  Vitals and nursing note reviewed.   Constitutional:       General: He is not in acute distress.  HENT:      Head: Atraumatic.      Right Ear: External ear normal.      Left Ear: External ear normal.      Nose: Nose normal.      Mouth/Throat:      Mouth: Mucous membranes are moist.      Pharynx: Oropharynx is clear.   Eyes:      General: No scleral icterus.     Extraocular Movements: Extraocular movements intact.      Conjunctiva/sclera: Conjunctivae normal.      Pupils: Pupils are equal, round, and reactive to light.   Cardiovascular:      Rate and Rhythm: Normal rate.   Pulmonary:      Effort: Pulmonary effort is normal. No respiratory distress.   Abdominal:      Comments: Tender left side.   Musculoskeletal:         General: Normal range of motion.      Cervical back: Normal range of motion.   Skin:     General: Skin is warm and dry.   Neurological:      Mental Status: He is alert and oriented to person, place, and time.   Psychiatric:         Behavior: Behavior normal.         ED COURSE and MDM   1215.  Patient with left lower quadrant pain and tenderness, CT scan pending.    1441.  Patient has diverticulitis on CT imaging, uncomplicated.  I discussed with the patient only other findings as dictated by the radiologist.  Hepatic steatosis follow-up recommended.  Stomach abnormality follow-up recommended, possibly outpatient upper endoscopy.  Low concern for abdominal wall pathology requiring emergent hospitalization or surgical consult - lipoma versus sebaceous cyst.  Augmentin will be given.  Return for worsening.    Electronic medical chart reviewed, including medical problems, medications, medical allergies, social history.  Recent hospitalizations and surgical procedures reviewed.  Recent clinic visits and consultations reviewed.  Recent labs and test results reviewed.  " Nursing notes reviewed.    The patient, their parent if applicable, and/or their medical decision maker(s) and I have reviewed all of the available historical information, applicable PMH, physical exam findings, and objective diagnostic data gathered during this ED visit.  We then discussed all work-up options and then together agreed upon the course taken during this visit.  The ultimate disposition and plan was a cooperative decision made between myself and the patient, their parent if applicable, and/or their legal decision maker(s).  The risks and benefits of all decisions made during this visit were discussed to the best of my abilities given the circumstances, and all parties are understanding of the pertinent ramifications of these decisions.      LABS  Labs Ordered and Resulted from Time of ED Arrival to Time of ED Departure   BASIC METABOLIC PANEL - Abnormal       Result Value    Sodium 141      Potassium 3.7      Chloride 106      Carbon Dioxide (CO2) 24      Anion Gap 11      Urea Nitrogen 14.2      Creatinine 0.98      GFR Estimate 90      Calcium 9.2      Glucose 168 (*)    CBC WITH PLATELETS AND DIFFERENTIAL - Abnormal    WBC Count 6.3      RBC Count 4.30 (*)     Hemoglobin 13.6      Hematocrit 38.9 (*)     MCV 91      MCH 31.6      MCHC 35.0      RDW 13.3      Platelet Count 168      % Neutrophils 70      % Lymphocytes 19      % Monocytes 7      % Eosinophils 3      % Basophils 1      % Immature Granulocytes 0      NRBCs per 100 WBC 0      Absolute Neutrophils 4.5      Absolute Lymphocytes 1.2      Absolute Monocytes 0.4      Absolute Eosinophils 0.2      Absolute Basophils 0.0      Absolute Immature Granulocytes 0.0      Absolute NRBCs 0.0     CRP INFLAMMATION - Normal    CRP Inflammation <3.00         IMAGING  Images reviewed by me.  Radiology report also reviewed.  CT Abdomen Pelvis w Contrast   Final Result   IMPRESSION:    1.  Acute uncomplicated diverticulitis of the descending colon.   2.   Diffuse hepatic steatosis. Mild hepatosplenomegaly.   3.  Hyperdense structure within the proximal stomach at the gastric fundus. This is favored to represent ingested material but could also represent an enhancing polyp on this single phase examination. This could be further evaluated with nonemergent    upper endoscopy.   4.  Mildly increased size of a subcutaneous nodule of the left anterior abdominal wall. This likely represents a sebaceous cyst but should be correlated with direct visual inspection.             Procedures    Medications   iopamidol (ISOVUE-370) solution 107 mL (107 mLs Intravenous $Given 3/31/25 1306)   sodium chloride 0.9 % bag for CT scan flush (67 mLs Intravenous $Given 3/31/25 1306)         IMPRESSION       ICD-10-CM    1. Diverticulitis  K57.92       2. Abnormal CT of the abdomen  R93.5     Stomach abnormality as seen on CT.      3. Hepatic steatosis  K76.0                Medication List        Started      amoxicillin-clavulanate 875-125 MG tablet  Commonly known as: AUGMENTIN  1 tablet, Oral, 2 TIMES DAILY                                  Wes Pak MD  03/31/25 2403

## 2025-04-01 ENCOUNTER — HOSPITAL ENCOUNTER (OUTPATIENT)
Dept: MRI IMAGING | Facility: CLINIC | Age: 55
Discharge: HOME | End: 2025-04-01
Attending: PHYSICIAN ASSISTANT | Admitting: PHYSICIAN ASSISTANT
Payer: COMMERCIAL

## 2025-04-01 DIAGNOSIS — G96.191 TARLOV CYST: ICD-10-CM

## 2025-04-01 PROCEDURE — A9585 GADOBUTROL INJECTION: HCPCS | Performed by: PHYSICIAN ASSISTANT

## 2025-04-01 PROCEDURE — 255N000002 HC RX 255 OP 636: Performed by: PHYSICIAN ASSISTANT

## 2025-04-01 PROCEDURE — 72197 MRI PELVIS W/O & W/DYE: CPT | Mod: 26 | Performed by: RADIOLOGY

## 2025-04-01 PROCEDURE — 72197 MRI PELVIS W/O & W/DYE: CPT

## 2025-04-01 RX ORDER — GADOBUTROL 604.72 MG/ML
10 INJECTION INTRAVENOUS ONCE
Status: COMPLETED | OUTPATIENT
Start: 2025-04-01 | End: 2025-04-01

## 2025-04-01 RX ADMIN — GADOBUTROL 10 ML: 604.72 INJECTION INTRAVENOUS at 07:14

## 2025-04-08 ENCOUNTER — MYC MEDICAL ADVICE (OUTPATIENT)
Dept: FAMILY MEDICINE | Facility: CLINIC | Age: 55
End: 2025-04-08
Payer: COMMERCIAL

## 2025-04-14 NOTE — PROGRESS NOTES
[facility] [address] [address]  Phone: [phone]  Fax: [phone]      Patient:  Robson Galeano, Date of birth 1970, 54 year old, male      ASSESSMENT & PLAN:  Patient is f/u today for abnormal MRI showing a likely Tarlov cyst on the left at the S3 level.  This is an incidental finding and he does not appear to by symptomatic from this finding.  He is f/u today with sacral/coccyx MRI w/wo 4/1/25 (stable from imaging 5/9/24).  He has has some worsening back pain, leg/foot numbness (R>L).       -Folate and B12 levels to r/o deficiencies that may be contributing to paresthesias in extremities. This can be done at his convenience and added onto blood work that he gets on a regular basis.   -referral placed to pain management to consider repeating lumbar MBB/RFA, injections that were helpful for him ~2 years ago.  Could also consider Intracept procedure L4-S1 if the former are no longer helpful.  This would not help with radicular pain/paresthesias however.  Ultimately, he would need to have a 2-level TLIF, but patient does not want to do surgery at this time.  Repeat EMG also discussed, but patient declines at this time.   -No further f/u for Tarlov cysts needed.    -f/u with neurosurgery if he wants to consider surgery in the future.    -we discussed stretches to perform - laying floor doing figure-4 stretch, child pose.  Also leaning against wall and stretching calf muscles.  Kneeling on floor and performing hip flexors.  The key is avoiding excessive F/E of back which will irritate the area of lumbar DDD.     I spent 39 minutes spent in patient care, independent review and interpretation of medical records/imaging, reviewing old records.    History of Present Illness:    10/8/24 Visit: - referred by Dr. Bradley PM&R for  Tarlov cyst/ Lumbar radiculopathy   Per Chart and Referring provider's note 6/6/24 - patient experiencing  constant urge to defecate, occasional urinary incontinence (worse with flexion), BLE pain (mostly posterior leg to knee level, but sometimes to feet), feet n/t and occasional weakness in legs.  Lumbar XR w/ T11 wedging and L5 pares defects, L1-3 1 mm retrolisthesis w/o instability.  MRI lumbar w/ sev R L4/5 NFS; mod L3-5 left NFS; sacral MRI w/ fusiform cystic lesion on left L3 NR presumably a perineural cyst; S3 unilateral cyst.  EMG was recommended to r/o neuropathy and possible L4/5 arin TFESI as well as repeating the sacral MRI in 6 months to monitor cystic structure.  Last PT at  in 2021 aggravated sxs.  No significant benefit w/ tylenol, ibu, oral steroids, amitriptyline, gabapentin. L3-5 arin RFA in 2022 and 2023 that was very helpful with lbp.  His exam was neuro intact.    PMH RA/psoriasis (follows w/ rheumatology at [facility]; on immunosuppressant therapy), RACHELL (CPAP), mild emphysema (former smoker).  Patient notes pain with his sciatica nerve, mostly on the right side (pain down the back of his leg).  He has had chronic back pain and has undergone 3-4 RFA which have been helpful for his back pain.  He had workup that found a Tarlov Cyst.  He notes constipation/diarrhea issues for about 1 year.  He switched over to injectable Methotrexate about 6 months ago and has improved with his abdominal issues (prior with oral had 3 bouts of diverticulitis).  He notes improved function of bladder with Flomax.  He sometimes gets the sensation that he needs to have a BM, but then doesn't really need to go.  It is not abnormal for him to have BM 4-5 times per day.  Going to brush his teeth and leaning forward causes pressure on his bladder area and increases sensation to have to urinate.  He notes a squeezing sensation in his sacral area that is slightly more eccentric to the right side for the past 8-9 months which he gets about 2 times per week.  He indicates an occasional twinge in his left leg and a stabbing  sensation in his low back for now reason.  He notices more problems in the morning and with getting up in his low back.  He notes occasional n/t in his feet in the last 6-8 months.  He notes associated curling of his feet and altered walking pattern when this occurs.  He notes this occurs more with prolonged sitting and bouncing around in the bobcat or lawnmower.      4/15/25 visit - f/u after repeat sacral MRI w/wo for monitoring cystic lesions (likely Tarlov Cysts).  Previous labs for Vit B12 and Folate for n/t in feet not yet completed.    He feels the back pain is more persistent.  3/10 intensity sitting.  He has more pain in the right leg/hip more recently.  He had fall from a ladder this winter.  He ended up wearing a boot for the right foot for a foot fracture.  He has more numbness in his feet.  Aching pain goes down to about knee level in posterior thighs.  Bending forward is worse than doing overhead activities.  He makes his wife  things from the floor when doing projects.  He walks daily, admits to needing to do more stretching.  Has not benefited from PT in the past.      Conservative Treatment:  Medical marijuana  Gabapentin 300 mg bid - tried tid but unable to tolerated b/c of sedative/cognitive effect  Amitriptyline    Injections: has done at [facility] 2 years ago.      PT - he attended 2 years ago and was told last time that they have nothing more to offer him.             Tobacco use:  Former (cessation 2022)    Alcohol consumption - Drinks 6-pack on some weekends; social drinker.  Does not drink every night.     IMAGING   Imaging independently reviewed.     CT CAP 3/31/25 - L4-S1 endplate changes, severe DDD  MUSCULOSKELETAL: Unchanged cyst along the left anterior aspect of the inferior sacrum, previously characterized by MRI. Mildly increased size of a subcutaneous nodule of the left anterior abdominal wall which now measures 1.6 cm compared to 1.1 cm   (series 5, image 124).  Small fat-containing left inguinal hernia. Degenerative changes of the spine and hips. Bilateral L5 pars defects without significant listhesis.  IMPRESSION:   1.  Acute uncomplicated diverticulitis of the descending colon.  2.  Diffuse hepatic steatosis. Mild hepatosplenomegaly.  3.  Hyperdense structure within the proximal stomach at the gastric fundus. This is favored to represent ingested material but could also represent an enhancing polyp on this single phase examination. This could be further evaluated with nonemergent upper endoscopy.  4.  Mildly increased size of a subcutaneous nodule of the left anterior abdominal wall. This likely represents a sebaceous cyst but should be correlated with direct visual inspection.    MRI sacrum/coccyx w/wo 4/1/25 -   Findings:  Rectosigmoid endoscopy clips demonstrate susceptibility artifact,  which degrades evaluation of the adjacent soft tissues.  Osseous structures  Osseous structures: No fracture, stress reaction, avascular necrosis,  or focal osseous lesion is seen.  Joint and periarticular soft tissue  Internal derangement of joints are not well assessed owing to chosen  field of view.  Muscles and tendons  Muscles: Visualized muscles are unremarkable without evidence of  muscle strain, atrophy or mass.   Tendons: The visualized tendons are intact.  Nerves:  The previously seen fusiform T1 hypointense, T2 hyperintense cystic  lesion, which is closely related to the S3 sacral nerve, is again  seen. The lesion size appears stable measuring on today's examination  2.3 x 1.7 x 2.3 cm in transverse, AP and CC dimensions, respectively.  The cystic structure is associated with the left S3 nerve distal to  the sacral neural foramina, most consistent with a Tarlov cyst.  Other Findings:  None.  Impression:  1. The examination is degraded by metallic artifact in the area of  concern introduced by the endoscopy clip.  2. Stable size and appearance of 2.3 cm T2 hyperintense  cystic  structure associated with the left S3 nerve distal to the sacral  neural foramen, most likely representing a Tarlov cyst. No contrast  was administered on today's examination.    EMG/NCS BLE 6/17/24 - Interpretation: Normal study   1. There is no electrodiagnostic evidence of lumbosacral radiculopathy, lumbosacral plexopathy, or focal neuropathy in the bilateral lower extremities.     XR Lumbar Bending Only 2/3 Views  Result Date: 6/4/2024  EXAM: XR LUMBAR FLEX/EXT 2/3 VIEWS LOCATION: [facility] DATE: 6/3/2024 INDICATION: Chronic bilateral low back pain without sciatica. COMPARISON: 4/23/2024 MR lumbar spine.   IMPRESSION: Flexion and extension views of the lower thoracic spine and lumbar spine. Five lumbar segments. Mild chronic anterior wedging T11. Interspace narrowing and adjacent osteophytes mid lumbar levels. Lower lumbar spine facet arthropathy. Satisfactory sacral alignment. L5 pars interarticularis defects minimally displaced with adjacent hypertrophic changes. 1 mm retrolisthesis L1-L2 and L2-L3 unchanged in flexion and extension with no high-grade instability noted. No significant subluxation/spondylolisthesis present at L5-S1.    CT Chest w/o Contrast  Result Date: 5/17/2024  EXAM: CT CHEST W/O CONTRAST LOCATION: [facility] DATE: 5/17/2024 INDICATION:  Abnormal chest x-ray; COMPARISON: The chest radiograph describing the abnormality was not immediately available for review. The report from a chest radiograph performed 4/24/2024 from [facility] was available and reviewed. TECHNIQUE: CT chest without IV contrast. Multiplanar reformats were obtained. Dose reduction techniques were used. CONTRAST: None. FINDINGS: LUNGS AND PLEURA: Mild paraseptal emphysema is present in the apices. Focal confluent subpleural bulla present along the anterior apical pleura of the left upper lobe (series 3, image 59). Small territories of subpleural opacity are  present along the posterior costal pleura of the lower lobes consistent with positional atelectasis. No airspace opacities. No pleural space abnormality. The report of the chest radiograph described a potential abnormality in the left lateral costophrenic angle. No opacity or lesion in this location. MEDIASTINUM: Cardiac chambers are normal in size. No pericardial effusion. Normal caliber thoracic aorta. No aortic or great vessel atheromatous calcifications. Esophagus is decompressed. No lymphadenopathy. The thyroid gland is normal. CORONARY ARTERY CALCIFICATION: None. UPPER ABDOMEN: Hepatic steatosis. MUSCULOSKELETAL: Disc space narrowing and discogenic sclerosis of T7-T8. Small marginal osteophytes at other mid and lower thoracic spine levels. No aggressive or destructive bone lesions. Several healed upper left rib fracture deformities.   IMPRESSION: 1.  No parenchymal abnormalities in the left lateral costophrenic sulcus, questioned on recent chest radiographs. 2.  Upper lung predominant emphysema. If patient is amenable and meets screening criteria, consider annual low-dose CT chest for lung cancer screening. 3.  Hepatic steatosis.     MR Sacrum and Coccyx w/o & w Contrast  Result Date: 5/9/2024  MR sacrum  without and with contrast 5/9/2024 8:26 AM Techniques: Multiplanar multisequence imaging of the sacrum was obtained before and after administration of intravenous contrast using tumor/infection protocol. Contrast: 10 mL Gadavist History: Neoplasm of sacral spinal nerve; Chronic bilateral low back pain without sciatica; Chronic bilateral low back pain without sciatica Comparison: CT abdomen pelvis 1/8/2024. Lumbar spine MR 4/23/2024 Findings: Rectosigmoid endoscopy clips with associated susceptibility artifact which degrades evaluation of the adjacent soft tissues. Osseous structures Osseous structures: No fracture, stress reaction, avascular necrosis, or focal osseous lesion is seen. Chronic pars defects at  L5. Joint and periarticular soft tissue Internal derangement of joints are not well assessed owing to chosen field of view. Partially visualized subchondral cystic change in the bilateral acetabula with paralabral cyst formation about the left acetabulum, indicative of overlying labral tear. Mild degenerative changes of the sacroiliac joints. Degenerative disc disease at L4-5. Muscles and tendons Muscles: Visualized muscles are unremarkable without evidence of muscle strain, atrophy or mass. Tendons: The visualized tendons are intact. Nerves: 2.4 x 1.7 x 2.4 cm fusiform T1 hypointense, T2 hyperintense cystic lesion closely related to the left S3 sacral nerve. No enhancement on postcontrast sequences. Other Findings: None.   Impression: Examination degraded by metallic artifact related to endoscopy clip. 2.4 cm nonenhancing T2 hyperintense cystic structure intimately associated with the left S3 nerve distal to the sacral neural foramina, most likely representing a Tarlov cyst. I have personally reviewed the examination and initial interpretation and I agree with the findings. LUCAS DEJESUS MD (Joe)   SYSTEM ID:  [id number]    MR Lumbar Spine w/o & w Contrast  Result Date: 4/24/2024  EXAM: MR LUMBAR SPINE W/O and W CONTRAST LOCATION: [facility] DATE: 4/23/2024 INDICATION:  Neoplasm of sacral spinal nerve, Chronic bilateral low back pain without sciatica, Chronic bilateral low back pain without sciatica COMPARISON: 05/28/2021. CONTRAST: 10ml gadavist TECHNIQUE: Routine Lumbar Spine MRI without and with IV contrast. FINDINGS: Degenerative endplate changes with Modic type I degenerative endplate changes at L3-L4 enhancement findings on postcontrast imaging in this area as well. Conus terminates normally. Straightening of the normal lumbar lordosis. L1-L2: No canal stenosis. No significant foraminal narrowing. Facet disease. L2-L3: Broad-based disc bulge eccentric to the left. Moderate  left foraminal narrowing. L3-L4: Broad-based disc bulge. Mild canal stenosis. Facet disease. Mild right and moderate left foraminal narrowing. L4-L5: Broad-based disc bulge. Mild canal stenosis. Facet disease. Severe right and moderate left foraminal narrowing. L5-S1: Broad-based disc bulge without canal stenosis. Facet disease. No significant foraminal narrowing.   IMPRESSION: 1.  Modic type I degenerative endplate changes at L3-L4. The left sacral lesion identified on prior CT abdomen pelvis from 01/08/2024 or is not visualized on this examination but this acquisition does not include the entirety of the sacral distribution. Recommend dedicated sacrum/sacroiliac MR without and with contrast.    DX Hand Bilateral 3+ Views  Result Date: 4/23/2024  EXAM:  DX HAND BILATERAL 3+ VIEWS  Advanced degenerative arthritis of the right 3rd MCP joint and bilateral STT joints. Moderate degenerative arthritis of the left 3rd MCP joint with surrounding subchondral cystic changes. Small chronic ununited bone fragment adjacent to the radial corner of left long finger proximal phalanx base, likely due to an old injury. Osteocartilaginous body about the right 3rd metacarpal head. Mild scattered degenerative arthritis of the remaining hands and wrists.     Physical Exam   /81 (BP Location: Right arm, Patient Position: Sitting)   Pulse 63   Resp 16   SpO2 97%     Musculoskeletal: Able to move all extremities.  No involuntary motor movements. No axial or paraspinal C/T/L spine tenderness to palpation.  Negative SLR.  +right Logan for low back/buttock pain.  Negative SI joint provocation.    Cervical flexion-extension range of motion: FROM w/o significant pain  Lumbar flexion/extension range of motion: low back pain w/ flexion/extension     Neurological:  Alert, NAD, and oriented to person, place, and time.   No cranial nerve deficit   Gait: symmetric, steady.  No assistive devices.    Strength (L/R)  5/5 Hip Flexion   5/5 Knee  Extension  5/5 Ankle Dorsiflexion  5/5 Extensor Hallucis Longus  5/5 Plantar Flexion    Reflexes (L/R)  1+/1+ Bicep  1+/1+ Brachioradialis  1+/1+ Patellar  1+/1+ Ankle    No Kurtis's   No ankle clonus    Sensation: reduced sensation and tingling in right posterolateral thigh, near completed circumference of distal leg and foot.        Review of Systems   See HPI     Past Medical History:   Diagnosis Date    Arthritis        Past Surgical History:   Procedure Laterality Date    COLONOSCOPY N/A 2024    Procedure: COLONOSCOPY, FLEXIBLE, WITH LESION REMOVAL USING SNARE;  Surgeon: Mason Herrera MD;  Location: [facility]    IR LUMBAR PUNCTURE  2021    IR LUMBAR PUNCTURE  2021    IR LUMBAR PUNCTURE  2022       Social History     Socioeconomic History    Marital status:    Tobacco Use    Smoking status: Former     Current packs/day: 0.00     Types: Cigarettes     Quit date: 2002     Years since quittin.7     Passive exposure: Past    Smokeless tobacco: Never   Vaping Use    Vaping status: Never Used   Substance and Sexual Activity    Alcohol use: Not Currently    Drug use: Yes     Types: Marijuana     Comment: has marijuana card     Social Determinants of Health     Financial Resource Strain: Low Risk  (3/20/2024)    Financial Resource Strain     Within the past 12 months, have you or your family members you live with been unable to get utilities (heat, electricity) when it was really needed?: No   Food Insecurity: No Food Insecurity (2024)    Received from [facility]    Hunger Vital Sign     Worried About Running Out of Food in the Last Year: Never true     Ran Out of Food in the Last Year: Never true   Transportation Needs: No Transportation Needs (2024)    Received from [facility]    PRAPARE - Transportation     Lack of Transportation (Medical): No     Lack of Transportation (Non-Medical): No   Physical Activity: Insufficiently Active (2024)    Received from  [facility]    Exercise Vital Sign     Days of Exercise per Week: 7 days     Minutes of Exercise per Session: 10 min   Stress: Stress Concern Present (3/20/2024)    [employer] [place] [employer] - Occupational Stress Questionnaire     Feeling of Stress : To some extent   Social Connections: Unknown (3/20/2024)    Social Connection and Isolation Panel [NHANES]     Frequency of Social Gatherings with Friends and Family: Twice a week   Housing Stability: Low Risk  (4/17/2024)    Received from [facility]    Housing Stability     What is your living situation today?: I have a steady place to live       family history includes Breast Cancer in his mother; Cerebrovascular Disease in his father; Colon Cancer in his maternal grandfather and mother; Diabetes in his father.       Brigid Anne PA-C  Department of Neurosurgery  Office: [phone]

## 2025-04-15 ENCOUNTER — OFFICE VISIT (OUTPATIENT)
Dept: NEUROSURGERY | Facility: CLINIC | Age: 55
End: 2025-04-15
Payer: COMMERCIAL

## 2025-04-15 VITALS
OXYGEN SATURATION: 97 % | RESPIRATION RATE: 16 BRPM | DIASTOLIC BLOOD PRESSURE: 81 MMHG | SYSTOLIC BLOOD PRESSURE: 135 MMHG | HEART RATE: 63 BPM

## 2025-04-15 DIAGNOSIS — R20.2 TINGLING IN EXTREMITIES: ICD-10-CM

## 2025-04-15 DIAGNOSIS — M54.50 CHRONIC BILATERAL LOW BACK PAIN WITHOUT SCIATICA: Primary | ICD-10-CM

## 2025-04-15 DIAGNOSIS — M51.362 DEGENERATION OF INTERVERTEBRAL DISC OF LUMBAR REGION WITH DISCOGENIC BACK PAIN AND LOWER EXTREMITY PAIN: ICD-10-CM

## 2025-04-15 DIAGNOSIS — G89.29 CHRONIC BILATERAL LOW BACK PAIN WITHOUT SCIATICA: Primary | ICD-10-CM

## 2025-04-15 PROCEDURE — 3079F DIAST BP 80-89 MM HG: CPT | Performed by: PHYSICIAN ASSISTANT

## 2025-04-15 PROCEDURE — 1125F AMNT PAIN NOTED PAIN PRSNT: CPT | Performed by: PHYSICIAN ASSISTANT

## 2025-04-15 PROCEDURE — 99214 OFFICE O/P EST MOD 30 MIN: CPT | Performed by: PHYSICIAN ASSISTANT

## 2025-04-15 PROCEDURE — 3075F SYST BP GE 130 - 139MM HG: CPT | Performed by: PHYSICIAN ASSISTANT

## 2025-04-15 ASSESSMENT — PAIN SCALES - GENERAL: PAINLEVEL_OUTOF10: MILD PAIN (3)

## 2025-04-15 NOTE — PATIENT INSTRUCTIONS
Referral back to pain management placed for MBB/RFA low back.  They will call you to schedule.      Get labs for folate and vitamin B12 when able.      No further follow up needed for Tarlov cysts in sacral area.      Follow up with neurosurgery if want to pursue surgical options.

## 2025-04-15 NOTE — LETTER
4/15/2025       RE: Robson Galeano  [address] [facility] [address]     Dear Colleague,    Thank you for referring your patient, Robson Galeano, to the [facility] [place] at [facility]. Please see a copy of my visit note below.      [facility] [place]  [address]  3RD FLOOR  [facility] [address]  Phone: [phone]  Fax: [phone]      Patient:  Robson Galeano, Date of birth 1970, 54 year old, male      ASSESSMENT & PLAN:  Patient is f/u today for abnormal MRI showing a likely Tarlov cyst on the left at the S3 level.  This is an incidental finding and he does not appear to by symptomatic from this finding.  He is f/u today with sacral/coccyx MRI w/wo 4/1/25 (stable from imaging 5/9/24).  He has has some worsening back pain, leg/foot numbness (R>L).       -Folate and B12 levels to r/o deficiencies that may be contributing to paresthesias in extremities. This can be done at his convenience and added onto blood work that he gets on a regular basis.   -referral placed to pain management to consider repeating lumbar MBB/RFA, injections that were helpful for him ~2 years ago.  Could also consider Intracept procedure L4-S1 if the former are no longer helpful.  This would not help with radicular pain/paresthesias however.  Ultimately, he would need to have a 2-level TLIF, but patient does not want to do surgery at this time.  Repeat EMG also discussed, but patient declines at this time.   -No further f/u for Tarlov cysts needed.    -f/u with neurosurgery if he wants to consider surgery in the future.    -we discussed stretches to perform - laying floor doing figure-4 stretch, child pose.  Also leaning against wall and stretching calf muscles.  Kneeling on floor and performing hip flexors.  The key is avoiding excessive F/E of back which will irritate the area of lumbar DDD.     I spent  39 minutes spent in patient care, independent review and interpretation of medical records/imaging, reviewing old records.    History of Present Illness:    10/8/24 Visit: - referred by Dr. Bradley PM&R for  Tarlov cyst/ Lumbar radiculopathy   Per Chart and Referring provider's note 6/6/24 - patient experiencing constant urge to defecate, occasional urinary incontinence (worse with flexion), BLE pain (mostly posterior leg to knee level, but sometimes to feet), feet n/t and occasional weakness in legs.  Lumbar XR w/ T11 wedging and L5 pares defects, L1-3 1 mm retrolisthesis w/o instability.  MRI lumbar w/ sev R L4/5 NFS; mod L3-5 left NFS; sacral MRI w/ fusiform cystic lesion on left L3 NR presumably a perineural cyst; S3 unilateral cyst.  EMG was recommended to r/o neuropathy and possible L4/5 arin TFESI as well as repeating the sacral MRI in 6 months to monitor cystic structure.  Last PT at  in 2021 aggravated sxs.  No significant benefit w/ tylenol, ibu, oral steroids, amitriptyline, gabapentin. L3-5 arin RFA in 2022 and 2023 that was very helpful with lbp.  His exam was neuro intact.    PMH RA/psoriasis (follows w/ rheumatology at [facility]; on immunosuppressant therapy), RACHELL (CPAP), mild emphysema (former smoker).  Patient notes pain with his sciatica nerve, mostly on the right side (pain down the back of his leg).  He has had chronic back pain and has undergone 3-4 RFA which have been helpful for his back pain.  He had workup that found a Tarlov Cyst.  He notes constipation/diarrhea issues for about 1 year.  He switched over to injectable Methotrexate about 6 months ago and has improved with his abdominal issues (prior with oral had 3 bouts of diverticulitis).  He notes improved function of bladder with Flomax.  He sometimes gets the sensation that he needs to have a BM, but then doesn't really need to go.  It is not abnormal for him to have BM 4-5 times per day.  Going to brush his teeth and leaning forward  causes pressure on his bladder area and increases sensation to have to urinate.  He notes a squeezing sensation in his sacral area that is slightly more eccentric to the right side for the past 8-9 months which he gets about 2 times per week.  He indicates an occasional twinge in his left leg and a stabbing sensation in his low back for now reason.  He notices more problems in the morning and with getting up in his low back.  He notes occasional n/t in his feet in the last 6-8 months.  He notes associated curling of his feet and altered walking pattern when this occurs.  He notes this occurs more with prolonged sitting and bouncing around in the bobcat or lawnmower.      4/15/25 visit - f/u after repeat sacral MRI w/wo for monitoring cystic lesions (likely Tarlov Cysts).  Previous labs for Vit B12 and Folate for n/t in feet not yet completed.    He feels the back pain is more persistent.  3/10 intensity sitting.  He has more pain in the right leg/hip more recently.  He had fall from a ladder this winter.  He ended up wearing a boot for the right foot for a foot fracture.  He has more numbness in his feet.  Aching pain goes down to about knee level in posterior thighs.  Bending forward is worse than doing overhead activities.  He makes his wife  things from the floor when doing projects.  He walks daily, admits to needing to do more stretching.  Has not benefited from PT in the past.      Conservative Treatment:  Medical marijuana  Gabapentin 300 mg bid - tried tid but unable to tolerated b/c of sedative/cognitive effect  Amitriptyline    Injections: has done at [facility] 2 years ago.      PT - he attended 2 years ago and was told last time that they have nothing more to offer him.             Tobacco use:  Former (cessation 2022)    Alcohol consumption - Drinks 6-pack on some weekends; social drinker.  Does not drink every night.     IMAGING   Imaging independently reviewed.     CT CAP 3/31/25 -  L4-S1 endplate changes, severe DDD  MUSCULOSKELETAL: Unchanged cyst along the left anterior aspect of the inferior sacrum, previously characterized by MRI. Mildly increased size of a subcutaneous nodule of the left anterior abdominal wall which now measures 1.6 cm compared to 1.1 cm   (series 5, image 124). Small fat-containing left inguinal hernia. Degenerative changes of the spine and hips. Bilateral L5 pars defects without significant listhesis.  IMPRESSION:   1.  Acute uncomplicated diverticulitis of the descending colon.  2.  Diffuse hepatic steatosis. Mild hepatosplenomegaly.  3.  Hyperdense structure within the proximal stomach at the gastric fundus. This is favored to represent ingested material but could also represent an enhancing polyp on this single phase examination. This could be further evaluated with nonemergent upper endoscopy.  4.  Mildly increased size of a subcutaneous nodule of the left anterior abdominal wall. This likely represents a sebaceous cyst but should be correlated with direct visual inspection.    MRI sacrum/coccyx w/wo 4/1/25 -   Findings:  Rectosigmoid endoscopy clips demonstrate susceptibility artifact,  which degrades evaluation of the adjacent soft tissues.  Osseous structures  Osseous structures: No fracture, stress reaction, avascular necrosis,  or focal osseous lesion is seen.  Joint and periarticular soft tissue  Internal derangement of joints are not well assessed owing to chosen  field of view.  Muscles and tendons  Muscles: Visualized muscles are unremarkable without evidence of  muscle strain, atrophy or mass.   Tendons: The visualized tendons are intact.  Nerves:  The previously seen fusiform T1 hypointense, T2 hyperintense cystic  lesion, which is closely related to the S3 sacral nerve, is again  seen. The lesion size appears stable measuring on today's examination  2.3 x 1.7 x 2.3 cm in transverse, AP and CC dimensions, respectively.  The cystic structure is associated  with the left S3 nerve distal to  the sacral neural foramina, most consistent with a Tarlov cyst.  Other Findings:  None.  Impression:  1. The examination is degraded by metallic artifact in the area of  concern introduced by the endoscopy clip.  2. Stable size and appearance of 2.3 cm T2 hyperintense cystic  structure associated with the left S3 nerve distal to the sacral  neural foramen, most likely representing a Tarlov cyst. No contrast  was administered on today's examination.    EMG/NCS BLE 6/17/24 - Interpretation: Normal study   1. There is no electrodiagnostic evidence of lumbosacral radiculopathy, lumbosacral plexopathy, or focal neuropathy in the bilateral lower extremities.     XR Lumbar Bending Only 2/3 Views  Result Date: 6/4/2024  EXAM: XR LUMBAR FLEX/EXT 2/3 VIEWS LOCATION: [facility] DATE: 6/3/2024 INDICATION: Chronic bilateral low back pain without sciatica. COMPARISON: 4/23/2024 MR lumbar spine.   IMPRESSION: Flexion and extension views of the lower thoracic spine and lumbar spine. Five lumbar segments. Mild chronic anterior wedging T11. Interspace narrowing and adjacent osteophytes mid lumbar levels. Lower lumbar spine facet arthropathy. Satisfactory sacral alignment. L5 pars interarticularis defects minimally displaced with adjacent hypertrophic changes. 1 mm retrolisthesis L1-L2 and L2-L3 unchanged in flexion and extension with no high-grade instability noted. No significant subluxation/spondylolisthesis present at L5-S1.    CT Chest w/o Contrast  Result Date: 5/17/2024  EXAM: CT CHEST W/O CONTRAST LOCATION: [facility] DATE: 5/17/2024 INDICATION:  Abnormal chest x-ray; COMPARISON: The chest radiograph describing the abnormality was not immediately available for review. The report from a chest radiograph performed 4/24/2024 from [facility] was available and reviewed. TECHNIQUE: CT chest without IV contrast. Multiplanar reformats were  obtained. Dose reduction techniques were used. CONTRAST: None. FINDINGS: LUNGS AND PLEURA: Mild paraseptal emphysema is present in the apices. Focal confluent subpleural bulla present along the anterior apical pleura of the left upper lobe (series 3, image 59). Small territories of subpleural opacity are present along the posterior costal pleura of the lower lobes consistent with positional atelectasis. No airspace opacities. No pleural space abnormality. The report of the chest radiograph described a potential abnormality in the left lateral costophrenic angle. No opacity or lesion in this location. MEDIASTINUM: Cardiac chambers are normal in size. No pericardial effusion. Normal caliber thoracic aorta. No aortic or great vessel atheromatous calcifications. Esophagus is decompressed. No lymphadenopathy. The thyroid gland is normal. CORONARY ARTERY CALCIFICATION: None. UPPER ABDOMEN: Hepatic steatosis. MUSCULOSKELETAL: Disc space narrowing and discogenic sclerosis of T7-T8. Small marginal osteophytes at other mid and lower thoracic spine levels. No aggressive or destructive bone lesions. Several healed upper left rib fracture deformities.   IMPRESSION: 1.  No parenchymal abnormalities in the left lateral costophrenic sulcus, questioned on recent chest radiographs. 2.  Upper lung predominant emphysema. If patient is amenable and meets screening criteria, consider annual low-dose CT chest for lung cancer screening. 3.  Hepatic steatosis.     MR Sacrum and Coccyx w/o & w Contrast  Result Date: 5/9/2024  MR sacrum  without and with contrast 5/9/2024 8:26 AM Techniques: Multiplanar multisequence imaging of the sacrum was obtained before and after administration of intravenous contrast using tumor/infection protocol. Contrast: 10 mL Gadavist History: Neoplasm of sacral spinal nerve; Chronic bilateral low back pain without sciatica; Chronic bilateral low back pain without sciatica Comparison: CT abdomen pelvis 1/8/2024.  Lumbar spine MR 4/23/2024 Findings: Rectosigmoid endoscopy clips with associated susceptibility artifact which degrades evaluation of the adjacent soft tissues. Osseous structures Osseous structures: No fracture, stress reaction, avascular necrosis, or focal osseous lesion is seen. Chronic pars defects at L5. Joint and periarticular soft tissue Internal derangement of joints are not well assessed owing to chosen field of view. Partially visualized subchondral cystic change in the bilateral acetabula with paralabral cyst formation about the left acetabulum, indicative of overlying labral tear. Mild degenerative changes of the sacroiliac joints. Degenerative disc disease at L4-5. Muscles and tendons Muscles: Visualized muscles are unremarkable without evidence of muscle strain, atrophy or mass. Tendons: The visualized tendons are intact. Nerves: 2.4 x 1.7 x 2.4 cm fusiform T1 hypointense, T2 hyperintense cystic lesion closely related to the left S3 sacral nerve. No enhancement on postcontrast sequences. Other Findings: None.   Impression: Examination degraded by metallic artifact related to endoscopy clip. 2.4 cm nonenhancing T2 hyperintense cystic structure intimately associated with the left S3 nerve distal to the sacral neural foramina, most likely representing a Tarlov cyst. I have personally reviewed the examination and initial interpretation and I agree with the findings. LUCAS DEJESUS MD (Joe)   SYSTEM ID:  [id number]    MR Lumbar Spine w/o & w Contrast  Result Date: 4/24/2024  EXAM: MR LUMBAR SPINE W/O and W CONTRAST LOCATION: [facility] DATE: 4/23/2024 INDICATION:  Neoplasm of sacral spinal nerve, Chronic bilateral low back pain without sciatica, Chronic bilateral low back pain without sciatica COMPARISON: 05/28/2021. CONTRAST: 10ml gadavist TECHNIQUE: Routine Lumbar Spine MRI without and with IV contrast. FINDINGS: Degenerative endplate changes with Modic type I degenerative  endplate changes at L3-L4 enhancement findings on postcontrast imaging in this area as well. Conus terminates normally. Straightening of the normal lumbar lordosis. L1-L2: No canal stenosis. No significant foraminal narrowing. Facet disease. L2-L3: Broad-based disc bulge eccentric to the left. Moderate left foraminal narrowing. L3-L4: Broad-based disc bulge. Mild canal stenosis. Facet disease. Mild right and moderate left foraminal narrowing. L4-L5: Broad-based disc bulge. Mild canal stenosis. Facet disease. Severe right and moderate left foraminal narrowing. L5-S1: Broad-based disc bulge without canal stenosis. Facet disease. No significant foraminal narrowing.   IMPRESSION: 1.  Modic type I degenerative endplate changes at L3-L4. The left sacral lesion identified on prior CT abdomen pelvis from 01/08/2024 or is not visualized on this examination but this acquisition does not include the entirety of the sacral distribution. Recommend dedicated sacrum/sacroiliac MR without and with contrast.    DX Hand Bilateral 3+ Views  Result Date: 4/23/2024  EXAM:  DX HAND BILATERAL 3+ VIEWS  Advanced degenerative arthritis of the right 3rd MCP joint and bilateral STT joints. Moderate degenerative arthritis of the left 3rd MCP joint with surrounding subchondral cystic changes. Small chronic ununited bone fragment adjacent to the radial corner of left long finger proximal phalanx base, likely due to an old injury. Osteocartilaginous body about the right 3rd metacarpal head. Mild scattered degenerative arthritis of the remaining hands and wrists.     Physical Exam   /81 (BP Location: Right arm, Patient Position: Sitting)   Pulse 63   Resp 16   SpO2 97%     Musculoskeletal: Able to move all extremities.  No involuntary motor movements. No axial or paraspinal C/T/L spine tenderness to palpation.  Negative SLR.  +right Logan for low back/buttock pain.  Negative SI joint provocation.    Cervical flexion-extension range of  motion: FROM w/o significant pain  Lumbar flexion/extension range of motion: low back pain w/ flexion/extension     Neurological:  Alert, NAD, and oriented to person, place, and time.   No cranial nerve deficit   Gait: symmetric, steady.  No assistive devices.    Strength (L/R)  5/5 Hip Flexion   5/5 Knee Extension  5/5 Ankle Dorsiflexion  5/5 Extensor Hallucis Longus  5/5 Plantar Flexion    Reflexes (L/R)  1+/1+ Bicep  1+/1+ Brachioradialis  1+/1+ Patellar  1+/1+ Ankle    No Kurtis's   No ankle clonus    Sensation: reduced sensation and tingling in right posterolateral thigh, near completed circumference of distal leg and foot.        Review of Systems   See HPI     Past Medical History:   Diagnosis Date     Arthritis        Past Surgical History:   Procedure Laterality Date     COLONOSCOPY N/A 2024    Procedure: COLONOSCOPY, FLEXIBLE, WITH LESION REMOVAL USING SNARE;  Surgeon: Mason Herrera MD;  Location: [facility]     IR LUMBAR PUNCTURE  2021     IR LUMBAR PUNCTURE  2021     IR LUMBAR PUNCTURE  2022       Social History     Socioeconomic History     Marital status:    Tobacco Use     Smoking status: Former     Current packs/day: 0.00     Types: Cigarettes     Quit date: 2002     Years since quittin.7     Passive exposure: Past     Smokeless tobacco: Never   Vaping Use     Vaping status: Never Used   Substance and Sexual Activity     Alcohol use: Not Currently     Drug use: Yes     Types: Marijuana     Comment: has marijuana card     Social Determinants of Health     Financial Resource Strain: Low Risk  (3/20/2024)    Financial Resource Strain      Within the past 12 months, have you or your family members you live with been unable to get utilities (heat, electricity) when it was really needed?: No   Food Insecurity: No Food Insecurity (2024)    Received from [facility]    Hunger Vital Sign      Worried About Running Out of Food in the Last Year: Never true       Ran Out of Food in the Last Year: Never true   Transportation Needs: No Transportation Needs (4/17/2024)    Received from [facility]    PRAPARE - Transportation      Lack of Transportation (Medical): No      Lack of Transportation (Non-Medical): No   Physical Activity: Insufficiently Active (4/17/2024)    Received from [facility]    Exercise Vital Sign      Days of Exercise per Week: 7 days      Minutes of Exercise per Session: 10 min   Stress: Stress Concern Present (3/20/2024)    [place] [employer] - Occupational Stress Questionnaire      Feeling of Stress : To some extent   Social Connections: Unknown (3/20/2024)    Social Connection and Isolation Panel [NHANES]      Frequency of Social Gatherings with Friends and Family: Twice a week   Housing Stability: Low Risk  (4/17/2024)    Received from [facility]    Housing Stability      What is your living situation today?: I have a steady place to live       family history includes Breast Cancer in his mother; Cerebrovascular Disease in his father; Colon Cancer in his maternal grandfather and mother; Diabetes in his father.       Brigid Anne PA-C  Department of Neurosurgery  Office: [phone]        Again, thank you for allowing me to participate in the care of your patient.      Sincerely,    Brigid Anne PA-C

## 2025-04-28 ASSESSMENT — PAIN SCALES - PAIN ENJOYMENT GENERAL ACTIVITY SCALE (PEG)
INTERFERED_ENJOYMENT_LIFE: 4
INTERFERED_GENERAL_ACTIVITY: 6
PEG_TOTALSCORE: 5
AVG_PAIN_PASTWEEK: 5

## 2025-04-29 ENCOUNTER — OFFICE VISIT (OUTPATIENT)
Dept: PALLIATIVE MEDICINE | Facility: OTHER | Age: 55
End: 2025-04-29
Attending: PHYSICIAN ASSISTANT
Payer: COMMERCIAL

## 2025-04-29 VITALS — HEART RATE: 73 BPM | OXYGEN SATURATION: 93 % | DIASTOLIC BLOOD PRESSURE: 85 MMHG | SYSTOLIC BLOOD PRESSURE: 135 MMHG

## 2025-04-29 DIAGNOSIS — N40.0 BENIGN PROSTATIC HYPERPLASIA, UNSPECIFIED WHETHER LOWER URINARY TRACT SYMPTOMS PRESENT: ICD-10-CM

## 2025-04-29 DIAGNOSIS — M51.362 DEGENERATION OF INTERVERTEBRAL DISC OF LUMBAR REGION WITH DISCOGENIC BACK PAIN AND LOWER EXTREMITY PAIN: ICD-10-CM

## 2025-04-29 DIAGNOSIS — M47.817 LUMBOSACRAL SPONDYLOSIS WITHOUT MYELOPATHY: Primary | ICD-10-CM

## 2025-04-29 DIAGNOSIS — G89.29 CHRONIC BILATERAL LOW BACK PAIN WITHOUT SCIATICA: ICD-10-CM

## 2025-04-29 DIAGNOSIS — A60.00 GENITAL HERPES SIMPLEX, UNSPECIFIED SITE: ICD-10-CM

## 2025-04-29 DIAGNOSIS — R20.2 TINGLING IN EXTREMITIES: ICD-10-CM

## 2025-04-29 DIAGNOSIS — M54.50 CHRONIC BILATERAL LOW BACK PAIN WITHOUT SCIATICA: ICD-10-CM

## 2025-04-29 PROCEDURE — 3075F SYST BP GE 130 - 139MM HG: CPT | Performed by: STUDENT IN AN ORGANIZED HEALTH CARE EDUCATION/TRAINING PROGRAM

## 2025-04-29 PROCEDURE — 99214 OFFICE O/P EST MOD 30 MIN: CPT | Performed by: STUDENT IN AN ORGANIZED HEALTH CARE EDUCATION/TRAINING PROGRAM

## 2025-04-29 PROCEDURE — 1125F AMNT PAIN NOTED PAIN PRSNT: CPT | Performed by: STUDENT IN AN ORGANIZED HEALTH CARE EDUCATION/TRAINING PROGRAM

## 2025-04-29 PROCEDURE — G0463 HOSPITAL OUTPT CLINIC VISIT: HCPCS | Performed by: STUDENT IN AN ORGANIZED HEALTH CARE EDUCATION/TRAINING PROGRAM

## 2025-04-29 PROCEDURE — 3079F DIAST BP 80-89 MM HG: CPT | Performed by: STUDENT IN AN ORGANIZED HEALTH CARE EDUCATION/TRAINING PROGRAM

## 2025-04-29 RX ORDER — VALACYCLOVIR HYDROCHLORIDE 500 MG/1
500 TABLET, FILM COATED ORAL DAILY
Qty: 90 TABLET | Refills: 1 | Status: SHIPPED | OUTPATIENT
Start: 2025-04-29

## 2025-04-29 RX ORDER — TAMSULOSIN HYDROCHLORIDE 0.4 MG/1
0.4 CAPSULE ORAL DAILY
Qty: 90 CAPSULE | Refills: 0 | Status: SHIPPED | OUTPATIENT
Start: 2025-04-29

## 2025-04-29 ASSESSMENT — PAIN SCALES - GENERAL: PAINLEVEL_OUTOF10: MILD PAIN (3)

## 2025-04-29 NOTE — PATIENT INSTRUCTIONS
Procedures:   Repeat bilateral L3-5 RFA ordered  Physical Therapy: continue HEP  Diagnostic Studies: reviewed CT Lumbar spine  Follow up: for procedure    Shashi Parsons MD  Interventional Pain Medicine  [facility]

## 2025-04-29 NOTE — PROGRESS NOTES
[facility] Interventional Evaluation    Date of visit: 4/29/2025    Reason for consultation:    Robson Galeano is a 55 year old male who is seen in consultation today for evaluation of an interventional strategy for pain management.    PCP is Nupur Torres.    Please see the [facility] health questionnaire which the patient completed and reviewed with me in detail.    Chief Complaint:    Chief Complaint   Patient presents with    Pain       Pain history:  Robson Galeano is a 55 year old male presenting with a chief pain complaint of bilateral low back pain    Onset: 10 years - no known injury  Location and Radiation: bilateral low back, R>L, radiates locally in bilateral posterior thighs  Provoking: getting out of chair, lumbar extension  Palliating: laying down reclined  Quality: dull  Severity: 2-9/10  Timing: constant  Numbness: endorses in bilateral plantar feet from neuropathy  Weakness: none    Patient denies red flag symptoms of corresponding bowel symptoms, fever/chills, saddle anesthesia, profound motor loss, weight loss, or sudden unremitting increase in pain.    Endorses bladder leakage when bending forward.    Current pain medications include:  gabapentin      Other treatments have included:  Robson Galeano has been seen at a pain clinic in the past.  Spine center with Dr. Smith, [place]  PT: has tried - not helpful  Injections:    4/27/23 - Bilateral L3-5 RFA - 75% relief for at least a year   1/6/22 - bilateral L3-5 RFA  Surgery: none    Past Medical History:  Past Medical History:   Diagnosis Date    Arthritis 2010     Patient Active Problem List    Diagnosis Date Noted    Depression 01/08/2024     Priority: Medium    Essential hypertension 01/08/2024     Priority: Medium    RACHELL (obstructive sleep apnea) 01/08/2024     Priority: Medium     Sleep study done at [facility] on 9/27/2018 (219#)-AHI 86, RDI  "-,  lowest oxygen saturation was 62%, CPAP 11 cm/H20       Polyarthritis 01/08/2024     Priority: Medium    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     Priority: Medium    Genital herpes simplex, unspecified site 10/24/2023     Priority: Medium    Chronic bilateral low back pain without sciatica 03/02/2023     Priority: Medium    Benign prostatic hyperplasia, unspecified whether lower urinary tract symptoms present 03/02/2023     Priority: Medium    Hypogonadism male 07/30/2021     Priority: Medium    Non-recurrent unilateral inguinal hernia without obstruction or gangrene 01/26/2021     Priority: Medium    Seropositive rheumatoid arthritis (H) 12/28/2019     Priority: Medium    Low testosterone in male 09/29/2019     Priority: Medium    Ocular migraine 08/31/2012     Priority: Medium     Formatting of this note might be different from the original.  Rare-last 10-11      Morbid obesity (H) 08/31/2012     Priority: Medium     Formatting of this note might be different from the original.         Past Surgical History:  Past Surgical History:   Procedure Laterality Date    COLONOSCOPY N/A 1/23/2024    Procedure: COLONOSCOPY, FLEXIBLE, WITH LESION REMOVAL USING SNARE;  Surgeon: Mason Herrera MD;  Location: [facility]    IR LUMBAR PUNCTURE  6/23/2021    IR LUMBAR PUNCTURE  8/4/2021    IR LUMBAR PUNCTURE  1/6/2022     Medications:  Current Outpatient Medications   Medication Sig Dispense Refill    amitriptyline (ELAVIL) 10 MG tablet TAKE 1 TABLET (10 MG) BY MOUTH AT BEDTIME 90 tablet 1    B-D TB SYRINGE 27G X 1/2\" 1 ML MISC       Filter Needles (BD FILTER NEEDLE) 18G X 1-1/2\" MISC USE ONCE WEEKLY WITH TESTOSTERONE 100 each 0    folic acid (FOLVITE) 1 MG tablet Take 1 mg by mouth      gabapentin (NEURONTIN) 300 MG capsule Take 2 capsules (600 mg) by mouth 2 times daily. 360 capsule 1    methotrexate sodium, PF, 50 MG/2ML SOLN injection Inject 15 mg Subcutaneous      Probiotic Product (FORTIFY DAILY PROBIOTIC PO)    " "   Syringe/Needle, Disp, 23G X 1-1/2\" 3 ML MISC 1 each once a week 12 each 3    tamsulosin (FLOMAX) 0.4 MG capsule Take 1 capsule (0.4 mg) by mouth daily. NEEDS OFFICE VISIT FOR FURTHER REFILLS 30 capsule 0    testosterone cypionate (DEPOTESTOSTERONE) 100 MG/ML injection Inject 1 mL (100 mg) into the muscle once a week 4 mL 5    valACYclovir (VALTREX) 1000 mg tablet Take 1 tablet (1,000 mg) by mouth daily 5 tablet 5    valACYclovir (VALTREX) 500 MG tablet TAKE ONE TABLET BY MOUTH ONCE DAILY 90 tablet 1     Allergies:     Allergies   Allergen Reactions    Bee Venom Other (See Comments)     Hallucinates, no anaphylaxis             Family history:  Family History   Problem Relation Age of Onset    Breast Cancer Mother     Colon Cancer Mother     Diabetes Father     Cerebrovascular Disease Father     Colon Cancer Maternal Grandfather        Physical Exam:  Vitals:    04/29/25 0838   BP: 135/85   Pulse: 73   SpO2: 93%     Exam:  Constitutional: Well developed, NAD  Head: normocephalic. Atraumatic.   Eyes: no redness or jaundice noted   CV: warm, well perfused extremities   Skin: no suspicious lesions or rashes   Psychiatric: mentation appears normal and affect full    Neuromuscular Examination:  +TTP to bilateral lower lumbar paraspinals    Diagnostic tests:  EXAM: CT LUMBAR SPINE W/O CONTRAST  LOCATION: [facility]  DATE: 1/16/2025     INDICATION: fall on feet from 8 feet  COMPARISON: None.  TECHNIQUE: Routine CT Lumbar Spine without IV contrast. Multiplanar reformats. Dose reduction techniques were used.     FINDINGS:  Nomenclature is based on 5 lumbar type vertebral bodies. No evidence of acute fracture or subluxation of the lumbar spine by CT imaging. 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 The vertebral bodies of the lumbar spine have normal stature   and alignment. Multilevel degenerative disc disease of the lumbar spine with disc height loss, most pronounced at L3/L4 and " L4/L5. The partially imaged intra-abdominal contents are unremarkable. Unremarkable visualized bony pelvis.     T12-L1: No posterior disc bulge or spinal canal narrowing. No neural foraminal narrowing.     L1-L2: No posterior disc bulge or spinal canal narrowing. No neural foraminal narrowing.     L2-L3: No posterior disc bulge or spinal canal narrowing. No neural foraminal narrowing.     L3-L4: Symmetric disc bulge without significant spinal canal narrowing. No neural foraminal narrowing.     L4-L5: Symmetric disc bulge without significant spinal canal narrowing. Mild bilateral neural foraminal narrowing.     L5-S1: No posterior disc bulge or spinal canal narrowing. No neural foraminal narrowing.                                                                      IMPRESSION:  1.  No evidence of acute fracture or subluxation of the lumbar spine by CT imaging.  2.  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    Personally reviewed imaging: yes    Assessment:  Lumbar spondylosis  Chronic pain syndrome    Robson Galeano is a 55 year old male who presents with 10 years of bilateral low back pain, not associated with his single injury.  Patient states pain is dull, locally radiating into the posterior thighs, not associated with dermatomal numbness.  There is some plantar numbness bilaterally, neuropathy.  Patient notes pain is worse with getting out of the chair and bending backwards.  Examination shows tenderness to palpation of the bilateral lower lumbar paraspinals.  Patient has tried physical therapies, medications, bilateral L3-5 radiofrequency ablation.  The latter provided significant 75% relief for at least a year.  He would like this repeated.  A repeat L3-5 RFA was ordered.  Will see him for the procedure.    Plan:  Diagnosis reviewed, treatment option addressed, and risk/benefits discussed.     Procedures:   Repeat bilateral L3-5 RFA ordered  Physical Therapy: continue HEP  Diagnostic Studies:  reviewed CT Lumbar spine  Follow up: for procedure    Shashi Parsons MD  Interventional Pain Medicine  [facility]

## 2025-04-29 NOTE — PROGRESS NOTES
Patient presents to the clinic today for a visit with Shashi Parsons MD regarding Pain Management.          4/7/2023     7:26 AM 4/29/2025     8:40 AM   PEG Score   PEG Total Score 7 5.33       UDS/CSA-     Medications- Gabapentin side effects causes some mental slowness, memory    Notes    Edwina MATTA [facility] Clinical Assistant

## 2025-05-12 ENCOUNTER — TELEPHONE (OUTPATIENT)
Dept: PALLIATIVE MEDICINE | Facility: OTHER | Age: 55
End: 2025-05-12
Payer: COMMERCIAL

## 2025-05-12 NOTE — TELEPHONE ENCOUNTER
Screening Questions for RFA Procedure      Procedure ordered? Repeat bilateral L3-5 RFA     What insurance are we billing for this procedure?  [employer]  IF SCHEDULING AT [place] [facility] OR [facility] PLEASE SCHEDULE AT LEAST 7-10 BUSINESS DAYS OUT SO A PA CAN BE OBTAINED    Is patient scheduled at [facility]? NO  If YES, route every encounter to [facility] SPINE CENTER CARE NAVIGATION POOL [[phone]]  Has patient had this injection before? Yes:   Any chance of pregnancy? Not Applicable   If YES, do NOT schedule and route to RN pool     Is  Needed?: No  Will patient have a ?  Yes   If pt is given sedation meds, no driving for 24 hours.  Is pt taking a cab or transportation service? NO      If so will need to be accompanied by an adult too (friend/family member) in order for IV sedation to be given.    Per [facility] Policy:  Outpatients are to have responsible adult or family member to accompany them at discharge and drive them home. A service providing medically trained drivers or attendants would be acceptable. Public transportation would not be acceptable unless the patient is accompanied by a responsible adult or family member.  Is patient taking any blood thinners (i.e. plavix, coumadin, jantoven, warfarin, heparin, pradaxa or dabigatran, etc)? No   If YES, do NOT schedule, and route to RN pool    Is patient taking any aspirin products (includes Excedrin and Fiorinal)? No.    If yes route to RN pool/ Dr. Rubio's Team - Do not schedule     Is patient taking any GLP-1 Antagonist (hold needed for sedation patients only) No   (semaglutide (Ozempic, Wegovy), dulaglutide (Trulicity), exenatide ER (Bydureon), tirzepatide (Mounjaro), Liraglutide (Saxenda, Victoza), semaglutide (Rybelsus),Terzepatide (Zepbound)  If YES, okay to schedule AND route to RN nurse / Dr. Rubio's Team    Does the patient have a bleeding or clotting disorder? No   If YES, it it OKAY to schedule AND route to RN pool  **For any patients  with platelet count <100, must be forwarded to provider**    Is patient diabetic? No If YES, have them bring their glucometer.    Does patient have an active infection or treated for one within the past week? No   If YES, do NOT schedule and route to RN nurse pool     Is patient currently taking any antibiotics?  No  For patients on chronic, preventative, or prophylactic antibiotics, procedures may be scheduled.   For patients on antibiotics for active or recent infection:antibiotic course must have been completed for 4 days    Is patient currently taking any steroid medications? (i.e. Prednisone, Medrol)  No   For patients on steroid medications, course must have been completed for 4 days    Is patient actively being treated for cancer or immunocompromised, including the spleen having been removed? No  If YES, do NOT schedule and route to RN pool     Any history of complications with sedation medications?  NO   If YES, OK to schedule AND route to RN pool     Any history of sleep apnea?  YES   If YES, OK to schedule AND route to RN pool     Any cardiac history?  NO   If YES, OK to schedule AND route to RN pool     Do you have an implanted pacemaker, ICD (implanted cardiac device) or AICD (automatic implanted cardiac device)?  NO  If YES, do NOT schedule AND route to RN pool (for all providers including Dr Herbert)   Obtain name of device :     Obtain name of cardiologist:       Do you have an implanted stimulator?  NO  If YES, OK to schedule AND route to nursing.   Instruct patient to bring in the remote to the appointment and it will need to be turned off.        Does patient have an allergy to contrast dye, iodine or shellfish?  No   If YES, OK to schedule. Route to RN pool AND add allergy information to appointment notes    Are you able to get on and off an exam table with minimal or no assistance? Yes   If NO, do NOT schedule and route to RN pool    Are you able to roll over and lay on your stomach  with minimal or no assistance? Yes   If NO, do NOT schedule and route to RN pool    Reminders:  If you are started on any steroids or antibiotics between now and your appointment, you must contact us because it may affect our ability to perform your procedure.  Yes  Informed patient that s/he needs to fast for 6 hours before procedure?  N/A  Informed patient that it is OK to take normal medications with sips of water, especially blood pressure medications, before the procedure and must hold blood thinners as instructed.  Yes  Informed patient to arrive 30 minutes before procedure time to have an IV inserted.  reviewed   Do NOT schedule at 0745, 0815 or 1245.  reviewed   All radiofrequency ablations are in a 60 minute time slot.If cervical RFA, please schedule each side separate. If okay to do bilateral cervical RFA, schedule for 90 minutes.  reviewed     Dr. Steve Pt's - Imaging Orders Needed   Please send all injections to RN Pool Not Applicable  Red Flags? Not Applicable  Debra Harris      [facility]  Pain Management

## 2025-06-15 ENCOUNTER — HEALTH MAINTENANCE LETTER (OUTPATIENT)
Age: 55
End: 2025-06-15

## 2025-06-17 ENCOUNTER — TELEPHONE (OUTPATIENT)
Dept: PALLIATIVE MEDICINE | Facility: OTHER | Age: 55
End: 2025-06-17
Payer: COMMERCIAL

## 2025-06-17 NOTE — TELEPHONE ENCOUNTER
Preprocedure appointment reminder call Lumbar RFA    Arrival time 10 am    Location: [facility] [address] [facility]    Do you have a ? yes    If patient doesn't have a  they will need to call and reschedule    Its ok to eat and drink as usual and take your  BP and diabetes medications if this applies to you.  (note patient has been informed yes or no)      To call and reschedule or talk to the nurse about any questions you may have please dial    [phone]

## 2025-06-18 ENCOUNTER — RADIOLOGY INJECTION OFFICE VISIT (OUTPATIENT)
Dept: PALLIATIVE MEDICINE | Facility: OTHER | Age: 55
End: 2025-06-18
Attending: STUDENT IN AN ORGANIZED HEALTH CARE EDUCATION/TRAINING PROGRAM
Payer: COMMERCIAL

## 2025-06-18 VITALS — HEART RATE: 59 BPM | SYSTOLIC BLOOD PRESSURE: 118 MMHG | OXYGEN SATURATION: 94 % | DIASTOLIC BLOOD PRESSURE: 62 MMHG

## 2025-06-18 DIAGNOSIS — M47.817 LUMBOSACRAL SPONDYLOSIS WITHOUT MYELOPATHY: ICD-10-CM

## 2025-06-18 DIAGNOSIS — M47.817 SPONDYLOSIS OF LUMBOSACRAL REGION WITHOUT MYELOPATHY OR RADICULOPATHY: ICD-10-CM

## 2025-06-18 PROCEDURE — 64635 DESTROY LUMB/SAC FACET JNT: CPT | Mod: 50 | Performed by: STUDENT IN AN ORGANIZED HEALTH CARE EDUCATION/TRAINING PROGRAM

## 2025-06-18 PROCEDURE — 250N000011 HC RX IP 250 OP 636: Performed by: STUDENT IN AN ORGANIZED HEALTH CARE EDUCATION/TRAINING PROGRAM

## 2025-06-18 RX ADMIN — LIDOCAINE HYDROCHLORIDE 5 ML: 20 INJECTION, SOLUTION INTRAVENOUS at 12:57

## 2025-06-18 ASSESSMENT — PAIN SCALES - GENERAL
PAINLEVEL_OUTOF10: MODERATE PAIN (4)
PAINLEVEL_OUTOF10: MODERATE PAIN (5)

## 2025-06-18 NOTE — NURSING NOTE
Discharge Information    IV Discontiued Time:  NA    Amount of Fluid Infused:  NA    Discharge Criteria = When patient returns to baseline or as per MD order    Consciousness:  Pt is fully awake    Circulation:  BP +/- 20% of pre-procedure level    Respiration:  Patient is able to breathe deeply    O2 Sat:  Patient is able to maintain O2 Sat >92% on room air    Activity:  Moves 4 extremities on command    Ambulation:  Patient is able to stand and walk or stand and pivot into wheelchair    Dressing:  Clean/dry or No Dressing    Notes:   Discharge instructions and AVS given to patient    Patient meets criteria for discharge?  YES    Admitted to PCU?  No    Responsible adult present to accompany patient home?  Yes    Signature/Title:    Savannah Agudelo RN  RN Care Coordinator  [facility]

## 2025-06-18 NOTE — PROGRESS NOTES
Pre procedure Diagnosis: lumbar spondylosis  Post procedure Diagnosis: Same  Procedure performed: Bilateral L3-5 medial branch lumbar radiofrequency ablation   Anesthesia: local  Complications: none  Operators: Shashi Parsons MD; Sara Fitzpatrick DO    Indications:   Robson Galeano is a 55 year old male was sent by Dr. Parsons for medial branch radiofrequency ablation.  They have a history of lumbar spondylosis.    Robson Galeano had medial branch blocks showing appropriate pain relief, therefore radiofrequency ablation will be done.     Options/alternatives, benefits and risks were discussed with the patient including bleeding, infection, no pain relief, tissue trauma, exposure to radiation, reaction to medications including seizure, spinal cord injury,increased pain after the procedure, weakness, numbness or sensory changes and headache. Questions were answered to his satisfaction and he agrees to proceed. Voluntary informed consent was obtained and signed.     Vitals were reviewed: Yes  Allergies were reviewed:  Yes   Medications were reviewed:  Yes   Pre-procedure pain score: 4/10    Procedure:  After getting informed consent, patient was brought into the procedure suite and was placed in a prone position on the procedure table.   A Pause for the Cause was performed.  Patient was prepped and draped in sterile fashion.     Robson Galeano had an IV line placed prior the procedure.  In the AP view the sacral alar notch was identified bilaterally. After anesthetizing the overlying skin and subcutaneous tissue with 1% lidocaine, a 150mm, 20G curved radiofrequency cannula with a 10mm active tip was placed at the sacral alar notch.  The C-arm was then positioned in the Right oblique view to afford optimal view of the L4-L5 vertebral bodies. Lidocaine 1% was used to anesthetize the skin and subcutaneous tissue at each level.  At each level, the above described cannula was positioned, overlying the intersection of the  transverse process and pedicle at L4 & L5, and was advanced under intermittent fluoroscopy until it contacted the transverse process and notch, and the tip slightly overran that process, just lateral to the mamillary process.  The position of each cannula was verified and optimized in the oblique view, AP view, and lateral view.  Each position was tested for motor stimulation, and was repositioned if necessary so that stimulation was negative for stimuli outside the immediate area of the desired lesion. Motor stimulation was completed at 2Hz, up to 1.5V.  Lidocaine 2% 1.0 ml was injected, and a 90 second, 80 degree Centigrade lesion was generated.  The needles were then rotated within the pathway of the medial branch, and locations were evaluated with repeat imaging.  A second lesion was then generated at each location.  The procedure was then repeated on the Left side, using the same technique as described above.    The needles were flushed with the local anesthetic as they were withdrawn.    Hemostasis was achieved, the area was cleaned, and bandaids were placed when appropriate.  The patient tolerated the procedure well, and was taken to the recovery room.    Images were saved to PACS.    Post-procedure pain score: 5/10    Follow-up includes:   -follow-up with referring provider    Sara Fitzpatrick DO  Pain medicine Fellow    Physician Attestation   I, Shashi Parsons MD, was present for all key and critical portions of the procedure, and I was immediately available during the remainder of the procedure.  Any changes to the documentation have been made above.    Shashi Parsons MD  Interventional Pain Medicine  [facility]

## 2025-06-18 NOTE — NURSING NOTE
Pre-procedure Intake  If YES to any questions or NO to having a   Please complete laminated checklist and leave on the computer keyboard for Provider, verbally inform provider if able.    For SCS Trial, RFA's or any sedation procedure:  Have you been fasting? No No sedation not needed  If yes, for how long?     Are you taking any any blood thinners such as Coumadin, Warfarin, Jantoven, Pradaxa Xarelto, Eliquis, Edoxaban, Enoxaparin, Lovenox, Heparin, Arixtra, Fondaparinux, or Fragmin? OR Antiplatelet medication such as Plavix, Brilinta, or Effient?   No   If yes, when did you take your last dose?     Do you take aspirin?  Yes -   ASA  If cervical procedure, have you held aspirin for 6 days?   NA    Is the Pt taking any GLP-1 Antagonist (hold needed for sedation patients only)  (semaglutide (Ozempic, Wegovy), dulaglutide (Trulicity), exenatide ER (Bydureon), tirzepatide (Mounjaro), Liraglutide (Saxenda, Victoza), semaglutide (Rybelsus)     NA  If yes, when did you take your last dose?     Do you have any allergies to contrast dye, iodine, steroid and/or numbing medications?  NO    Are you currently taking antibiotics or have an active infection?  NO    Have you had a fever/elevated temperature within the past week? NO    Are you currently taking oral steroids? NO    Do you have a ? Yes    Are you pregnant or breastfeeding?  NO    Have you received any vaccinations in the last week? NO    Notify provider and RNs if systolic BP >170, diastolic BP >100, P >100 or O2 sats < 90%

## 2025-06-18 NOTE — PATIENT INSTRUCTIONS
[facility]  Radiofrequency Ablation (RFA) Discharge Instructions     Today you saw:   Dr. Parsons    You should anticipate procedural pain for up to 2 weeks.     You may receive a prescription for pain medication and you should take this as directed.     It may take up to 8 weeks to receive relief from the RFA     Follow up with your provider in 4-6 weeks.     If you received sedation before, during or after your procedure, for the next 24 hours you shall NOT:    -Drive    -Operate machinery    -Drink alcohol    -Sign any legal documents     You may resume your normal diet     You may resume your regular medications after the procedure     Be cautious with walking. Numbness and/or weakness in the lower extremities may occur for up to 6-8 hours due to effect of local anesthetic    Avoid strenuous activity for the first 24 hours     You may resume your regular activities after 24 hours     You may shower, however no swimming, tub baths or hot tubs for 24 hours following your procedure     You may use ice packs 10-15 minutes three to four times a day at the injection site for comfort     Do not use heat to painful areas for 6 to 8 hours. This will give the local anesthetic time to wear off and prevent you from accidentally burning your skin.     Unless you have been directed to avoid the use of anti-inflammatory medications (NSAIDS), you may use medications such as ibuprofen, Aleve or Tylenol for pain control if needed.     If you experience any of the following, call the Pain Clinic at [phone]:   -Fever over 100 degree F    -Swelling, bleeding, redness, drainage, warmth at the injection site    -Progressive weakness or numbness in your legs or arms    -Loss of bowel or bladder function    -Unusual headache that is not relieved by Tylenol    -Unusual new onset of pain that is not improving

## 2025-06-25 SDOH — HEALTH STABILITY: PHYSICAL HEALTH: ON AVERAGE, HOW MANY DAYS PER WEEK DO YOU ENGAGE IN MODERATE TO STRENUOUS EXERCISE (LIKE A BRISK WALK)?: 7 DAYS

## 2025-06-25 SDOH — HEALTH STABILITY: PHYSICAL HEALTH: ON AVERAGE, HOW MANY MINUTES DO YOU ENGAGE IN EXERCISE AT THIS LEVEL?: 20 MIN

## 2025-06-25 ASSESSMENT — SOCIAL DETERMINANTS OF HEALTH (SDOH): HOW OFTEN DO YOU GET TOGETHER WITH FRIENDS OR RELATIVES?: ONCE A WEEK

## 2025-06-30 ENCOUNTER — OFFICE VISIT (OUTPATIENT)
Dept: FAMILY MEDICINE | Facility: CLINIC | Age: 55
End: 2025-06-30
Payer: COMMERCIAL

## 2025-06-30 VITALS
DIASTOLIC BLOOD PRESSURE: 84 MMHG | HEIGHT: 67 IN | HEART RATE: 74 BPM | WEIGHT: 202 LBS | TEMPERATURE: 97.8 F | SYSTOLIC BLOOD PRESSURE: 138 MMHG | BODY MASS INDEX: 31.71 KG/M2 | RESPIRATION RATE: 20 BRPM | OXYGEN SATURATION: 98 %

## 2025-06-30 DIAGNOSIS — G89.29 CHRONIC BILATERAL LOW BACK PAIN WITHOUT SCIATICA: ICD-10-CM

## 2025-06-30 DIAGNOSIS — A60.00 GENITAL HERPES SIMPLEX, UNSPECIFIED SITE: ICD-10-CM

## 2025-06-30 DIAGNOSIS — M54.50 CHRONIC BILATERAL LOW BACK PAIN WITHOUT SCIATICA: ICD-10-CM

## 2025-06-30 DIAGNOSIS — G47.01 INSOMNIA DUE TO MEDICAL CONDITION: ICD-10-CM

## 2025-06-30 DIAGNOSIS — E29.1 HYPOGONADISM MALE: ICD-10-CM

## 2025-06-30 DIAGNOSIS — Z00.00 ROUTINE GENERAL MEDICAL EXAMINATION AT A HEALTH CARE FACILITY: Primary | ICD-10-CM

## 2025-06-30 DIAGNOSIS — N40.0 BENIGN PROSTATIC HYPERPLASIA, UNSPECIFIED WHETHER LOWER URINARY TRACT SYMPTOMS PRESENT: ICD-10-CM

## 2025-06-30 DIAGNOSIS — J30.2 SEASONAL ALLERGIC RHINITIS, UNSPECIFIED TRIGGER: ICD-10-CM

## 2025-06-30 PROCEDURE — 99396 PREV VISIT EST AGE 40-64: CPT | Performed by: NURSE PRACTITIONER

## 2025-06-30 PROCEDURE — 3079F DIAST BP 80-89 MM HG: CPT | Performed by: NURSE PRACTITIONER

## 2025-06-30 PROCEDURE — 99214 OFFICE O/P EST MOD 30 MIN: CPT | Mod: 25 | Performed by: NURSE PRACTITIONER

## 2025-06-30 PROCEDURE — 3075F SYST BP GE 130 - 139MM HG: CPT | Performed by: NURSE PRACTITIONER

## 2025-06-30 PROCEDURE — G2211 COMPLEX E/M VISIT ADD ON: HCPCS | Performed by: NURSE PRACTITIONER

## 2025-06-30 PROCEDURE — 1125F AMNT PAIN NOTED PAIN PRSNT: CPT | Performed by: NURSE PRACTITIONER

## 2025-06-30 RX ORDER — VALACYCLOVIR HYDROCHLORIDE 1 G/1
1000 TABLET, FILM COATED ORAL DAILY
Qty: 5 TABLET | Refills: 5 | Status: SHIPPED | OUTPATIENT
Start: 2025-06-30

## 2025-06-30 RX ORDER — TESTOSTERONE CYPIONATE 1000 MG/10ML
100 INJECTION, SOLUTION INTRAMUSCULAR WEEKLY
Qty: 4 ML | Refills: 5 | Status: SHIPPED | OUTPATIENT
Start: 2025-06-30

## 2025-06-30 RX ORDER — NEEDLES, FILTER 19GX1 1/2"
NEEDLE, DISPOSABLE MISCELLANEOUS
Qty: 100 EACH | Refills: 0 | Status: CANCELLED | OUTPATIENT
Start: 2025-06-30

## 2025-06-30 RX ORDER — FEXOFENADINE HCL 180 MG/1
180 TABLET ORAL DAILY
COMMUNITY
End: 2025-06-30 | Stop reason: ALTCHOICE

## 2025-06-30 RX ORDER — CETIRIZINE HYDROCHLORIDE 10 MG/1
10 TABLET ORAL DAILY
COMMUNITY
End: 2025-06-30

## 2025-06-30 RX ORDER — TAMSULOSIN HYDROCHLORIDE 0.4 MG/1
0.4 CAPSULE ORAL DAILY
Qty: 90 CAPSULE | Refills: 3 | Status: SHIPPED | OUTPATIENT
Start: 2025-06-30

## 2025-06-30 RX ORDER — AMITRIPTYLINE HYDROCHLORIDE 10 MG/1
10 TABLET ORAL AT BEDTIME
Qty: 90 TABLET | Refills: 3 | Status: SHIPPED | OUTPATIENT
Start: 2025-06-30

## 2025-06-30 RX ORDER — GABAPENTIN 300 MG/1
600 CAPSULE ORAL 2 TIMES DAILY
Qty: 360 CAPSULE | Refills: 3 | Status: SHIPPED | OUTPATIENT
Start: 2025-06-30

## 2025-06-30 RX ORDER — VALACYCLOVIR HYDROCHLORIDE 500 MG/1
500 TABLET, FILM COATED ORAL DAILY
Qty: 90 TABLET | Refills: 3 | Status: SHIPPED | OUTPATIENT
Start: 2025-06-30

## 2025-06-30 RX ORDER — CETIRIZINE HYDROCHLORIDE 10 MG/1
10 TABLET ORAL DAILY
Qty: 90 TABLET | Refills: 3 | Status: SHIPPED | OUTPATIENT
Start: 2025-06-30

## 2025-06-30 ASSESSMENT — PAIN SCALES - GENERAL: PAINLEVEL_OUTOF10: MODERATE PAIN (4)

## 2025-06-30 NOTE — PROGRESS NOTES
"Preventive Care Visit  [facility]  Nupur Son DNP, Family Medicine  Jun 30, 2025      Assessment & Plan     Routine general medical examination at a health care facility  Pleasant 55-year-old male in for annual routine preventative exam.    Benign prostatic hyperplasia, unspecified whether lower urinary tract symptoms present  Chronic, stable.  Continue without any changes.  - tamsulosin (FLOMAX) 0.4 MG capsule; Take 1 capsule (0.4 mg) by mouth daily.    Hypogonadism male  Chronic, has been stable.  Due for testosterone recheck.  Patient to schedule lab only appointment.  Continue for now without any changes.  - testosterone cypionate (DEPOTESTOSTERONE) 100 MG/ML injection; Inject 1 mL (100 mg) into the muscle once a week.  - Syringe/Needle, Disp, 23G X 1-1/2\" 3 ML MISC; 1 each once a week.  - Testosterone Free and Total; Future    Chronic bilateral low back pain without sciatica  Chronic, stable.  Just had ablation.  Continue medications without any changes.  - gabapentin (NEURONTIN) 300 MG capsule; Take 2 capsules (600 mg) by mouth 2 times daily.  - amitriptyline (ELAVIL) 10 MG tablet; Take 1 tablet (10 mg) by mouth at bedtime.    Insomnia due to medical condition  Chronic, stable on amitriptyline.  No changes.  - amitriptyline (ELAVIL) 10 MG tablet; Take 1 tablet (10 mg) by mouth at bedtime.    Genital herpes simplex, unspecified site  Chronic, stable.  Does get intermittent flares throughout the year.  Continue to take daily.  - valACYclovir (VALTREX) 1000 mg tablet; Take 1 tablet (1,000 mg) by mouth daily.  - valACYclovir (VALTREX) 500 MG tablet; Take 1 tablet (500 mg) by mouth daily.    Seasonal allergic rhinitis, unspecified trigger  Chronic, improved on cetirizine.  Orders placed.  - cetirizine (ZYRTEC) 10 MG tablet; Take 1 tablet (10 mg) by mouth daily.    The longitudinal plan of care for the diagnosis(es)/condition(s) as documented were addressed during this visit. Due to " "the added complexity in care, I will continue to support Robson in the subsequent management and with ongoing continuity of care.     Patient has been advised of split billing requirements and indicates understanding: Yes        BMI  Estimated body mass index is 31.64 kg/m  as calculated from the following:    Height as of this encounter: 1.702 m (5' 7\").    Weight as of this encounter: 91.6 kg (202 lb).   Weight management plan: Discussed healthy diet and exercise guidelines      Wt Readings from Last 3 Encounters:   06/30/25 91.6 kg (202 lb)   03/31/25 99.8 kg (220 lb)   01/16/25 99.3 kg (219 lb)        Reviewed preventive health counseling, as reflected in patient instructions  Counseling  Appropriate preventive services were addressed with this patient via screening, questionnaire, or discussion as appropriate for fall prevention, nutrition, physical activity, Tobacco-use cessation, social engagement, weight loss and cognition.  Checklist reviewing preventive services available has been given to the patient.  Reviewed patient's diet, addressing concerns and/or questions.         Follow-up    Follow-up Visit   Expected date:  Jun 30, 2026 (Approximate)      Follow Up Appointment Details:     Follow-up with whom?: PCP    Follow-Up for what?: Adult Preventive    How?: In Person                 Subjective   Robson is a 55 year old, presenting for the following:  Physical        6/30/2025    11:43 AM   Additional Questions   Roomed by Raissa BARAHONA       Wt Readings from Last 4 Encounters:   06/30/25 91.6 kg (202 lb)   03/31/25 99.8 kg (220 lb)   01/16/25 99.3 kg (219 lb)   10/09/24 99.3 kg (219 lb)      Herpes follow up    Allergies seasonal - worse it has been this year        Advance Care Planning    Document printed and given to patient        6/25/2025   General Health   How would you rate your overall physical health? Good    Feel stress (tense, anxious, or unable to sleep) Not at all        Proxy-reported "         6/25/2025   Nutrition   Three or more servings of calcium each day? (!) NO    Diet: Regular (no restrictions)    How many servings of fruit and vegetables per day? (!) 2-3    How many sweetened beverages each day? 0-1        Proxy-reported         6/25/2025   Exercise   Days per week of moderate/strenous exercise 7 days    Average minutes spent exercising at this level 20 min        Proxy-reported         6/25/2025   Social Factors   Frequency of gathering with friends or relatives Once a week    Worry food won't last until get money to buy more No    Food not last or not have enough money for food? No    Do you have housing? (Housing is defined as stable permanent housing and does not include staying outside in a car, in a tent, in an abandoned building, in an overnight shelter, or couch-surfing.) Yes    Are you worried about losing your housing? No    Lack of transportation? No    Unable to get utilities (heat,electricity)? No        Proxy-reported         6/25/2025   Fall Risk   Fallen 2 or more times in the past year? No    Trouble with walking or balance? No        Proxy-reported          6/25/2025   Dental   Dentist two times every year? Yes        Proxy-reported           Today's PHQ-2 Score:       5/8/2025    11:16 AM   PHQ-2 ( 1999 [employer])   Q1: Little interest or pleasure in doing things 0    Q2: Feeling down, depressed or hopeless 0    PHQ-2 Score 0    Q1: Little interest or pleasure in doing things Not at all    Q2: Feeling down, depressed or hopeless Not at all    PHQ-2 Score 0        Proxy-reported         6/25/2025   Substance Use   Alcohol more than 3/day or more than 7/wk No    Do you use any other substances recreationally? No        Proxy-reported     Social History     Tobacco Use    Smoking status: Former     Current packs/day: 0.00     Average packs/day: 0.5 packs/day for 8.0 years (4.0 ttl pk-yrs)     Types: Cigarettes     Start date: 1/1/1994     Quit date: 1/1/2002     Years since  "quittin.5     Passive exposure: Past    Smokeless tobacco: Never   Vaping Use    Vaping status: Never Used   Substance Use Topics    Alcohol use: Yes    Drug use: Yes     Types: Marijuana     Comment: has marijuana card             2025   One time HIV Screening   Previous HIV test? Yes        Proxy-reported         2025   STI Screening   New sexual partner(s) since last STI/HIV test? No        Proxy-reported   Last PSA:   Prostate Specific Antigen Screen   Date Value Ref Range Status   2024 1.14 0.00 - 3.50 ng/mL Final     PSA Tumor Marker   Date Value Ref Range Status   2023 0.95 0.00 - 3.50 ng/mL Final     ASCVD Risk   The 10-year ASCVD risk score (Martha MEADOWS, et al., 2019) is: 6.5%    Values used to calculate the score:      Age: 55 years      Sex: Male      Is Non- : No      Diabetic: No      Tobacco smoker: No      Systolic Blood Pressure: 138 mmHg      Is BP treated: No      HDL Cholesterol: 45 mg/dL      Total Cholesterol: 158 mg/dL         Reviewed and updated as needed this visit by Provider                    Past Medical History:   Diagnosis Date    Arthritis      Past Surgical History:   Procedure Laterality Date    BACK SURGERY      Lazer oblation    COLONOSCOPY N/A 2024    Procedure: COLONOSCOPY, FLEXIBLE, WITH LESION REMOVAL USING SNARE;  Surgeon: Mason Herrera MD;  Location: [facility]    HERNIA REPAIR      IR LUMBAR PUNCTURE  2021    IR LUMBAR PUNCTURE  2021    IR LUMBAR PUNCTURE  2022         Review of Systems  Constitutional, neuro, ENT, endocrine, pulmonary, cardiac, gastrointestinal, genitourinary, musculoskeletal, integument and psychiatric systems are negative, except as otherwise noted.     Objective    Exam  /84 (BP Location: Right arm)   Pulse 74   Temp 97.8  F (36.6  C) (Tympanic)   Resp 20   Ht 1.702 m (5' 7\")   Wt 91.6 kg (202 lb)   SpO2 98%   BMI 31.64 kg/m     Estimated body " "mass index is 31.64 kg/m  as calculated from the following:    Height as of this encounter: 1.702 m (5' 7\").    Weight as of this encounter: 91.6 kg (202 lb).    Physical Exam  GENERAL: alert and no distress  EYES: Eyes grossly normal to inspection, PERRL and conjunctivae and sclerae normal  HENT: ear canals and TM's normal, nose and mouth without ulcers or lesions  NECK: no adenopathy, no asymmetry, masses, or scars  RESP: lungs clear to auscultation - no rales, rhonchi or wheezes  CV: regular rate and rhythm, normal S1 S2, no S3 or S4, no murmur, click or rub, no peripheral edema  ABDOMEN: soft, nontender, no hepatosplenomegaly, no masses and bowel sounds normal  MS: no gross musculoskeletal defects noted, no edema  SKIN: no suspicious lesions or rashes  NEURO: Normal strength and tone, mentation intact and speech normal  PSYCH: mentation appears normal, affect normal/bright        Signed Electronically by: uNpur Son DNP      Chart documentation with Dragon Voice recognition Software. Although reviewed after completion, some words and grammatical errors may remain.   "

## 2025-06-30 NOTE — PATIENT INSTRUCTIONS
Patient Education   Preventive Care Advice   This is general advice given by our system to help you stay healthy. However, your care team may have specific advice just for you. Please talk to your care team about your preventive care needs.  Nutrition  Eat 5 or more servings of fruits and vegetables each day.  Try wheat bread, brown rice and whole grain pasta (instead of white bread, rice, and pasta).  Get enough calcium and vitamin D. Check the label on foods and aim for 100% of the RDA (recommended daily allowance).  Lifestyle  Exercise at least 150 minutes each week  (30 minutes a day, 5 days a week).  Do muscle strengthening activities 2 days a week. These help control your weight and prevent disease.  No smoking.  Wear sunscreen to prevent skin cancer.  Have a dental exam and cleaning every 6 months.  Yearly exams  See your health care team every year to talk about:  Any changes in your health.  Any medicines your care team has prescribed.  Preventive care, family planning, and ways to prevent chronic diseases.  Shots (vaccines)   HPV shots (up to age 26), if you've never had them before.  Hepatitis B shots (up to age 59), if you've never had them before.  COVID-19 shot: Get this shot when it's due.  Flu shot: Get a flu shot every year.  Tetanus shot: Get a tetanus shot every 10 years.  Pneumococcal, hepatitis A, and RSV shots: Ask your care team if you need these based on your risk.  Shingles shot (for age 50 and up)  General health tests  Diabetes screening:  Starting at age 35, Get screened for diabetes at least every 3 years.  If you are younger than age 35, ask your care team if you should be screened for diabetes.  Cholesterol test: At age 39, start having a cholesterol test every 5 years, or more often if advised.  Bone density scan (DEXA): At age 50, ask your care team if you should have this scan for osteoporosis (brittle bones).  Hepatitis C: Get tested at least once in your life.  STIs (sexually  transmitted infections)  Before age 24: Ask your care team if you should be screened for STIs.  After age 24: Get screened for STIs if you're at risk. You are at risk for STIs (including HIV) if:  You are sexually active with more than one person.  You don't use condoms every time.  You or a partner was diagnosed with a sexually transmitted infection.  If you are at risk for HIV, ask about PrEP medicine to prevent HIV.  Get tested for HIV at least once in your life, whether you are at risk for HIV or not.  Cancer screening tests  Cervical cancer screening: If you have a cervix, begin getting regular cervical cancer screening tests starting at age 21.  Breast cancer scan (mammogram): If you've ever had breasts, begin having regular mammograms starting at age 40. This is a scan to check for breast cancer.  Colon cancer screening: It is important to start screening for colon cancer at age 45.  Have a colonoscopy test every 10 years (or more often if you're at risk) Or, ask your provider about stool tests like a FIT test every year or Cologuard test every 3 years.  To learn more about your testing options, visit:   .  For help making a decision, visit:   https://bit.ly/wc60049.  Prostate cancer screening test: If you have a prostate, ask your care team if a prostate cancer screening test (PSA) at age 55 is right for you.  Lung cancer screening: If you are a current or former smoker ages 50 to 80, ask your care team if ongoing lung cancer screenings are right for you.  For informational purposes only. Not to replace the advice of your health care provider. Copyright   2023 [facility]. All rights reserved. Clinically reviewed by the [facility] Transitions Program. [employer] [id number] - REV 01/24.

## (undated) DEVICE — ENDO SNARE EXACTO COLD 9MM LOOP 2.4MMX230CM 00711115

## (undated) DEVICE — DEVICE ENDO CLIP INSTINCT PLUS INSC-P-7-230-S  G58010